# Patient Record
Sex: FEMALE | Race: BLACK OR AFRICAN AMERICAN | NOT HISPANIC OR LATINO | Employment: OTHER | URBAN - METROPOLITAN AREA
[De-identification: names, ages, dates, MRNs, and addresses within clinical notes are randomized per-mention and may not be internally consistent; named-entity substitution may affect disease eponyms.]

---

## 2018-01-01 ENCOUNTER — OFFICE VISIT (OUTPATIENT)
Dept: HEMATOLOGY/ONCOLOGY | Facility: CLINIC | Age: 73
End: 2018-01-01
Payer: MEDICARE

## 2018-01-01 ENCOUNTER — TELEPHONE (OUTPATIENT)
Dept: HEMATOLOGY/ONCOLOGY | Facility: CLINIC | Age: 73
End: 2018-01-01

## 2018-01-01 VITALS
DIASTOLIC BLOOD PRESSURE: 86 MMHG | WEIGHT: 139.25 LBS | TEMPERATURE: 98 F | HEART RATE: 80 BPM | OXYGEN SATURATION: 98 % | SYSTOLIC BLOOD PRESSURE: 185 MMHG | BODY MASS INDEX: 23.9 KG/M2

## 2018-01-01 VITALS
OXYGEN SATURATION: 99 % | WEIGHT: 138.25 LBS | SYSTOLIC BLOOD PRESSURE: 183 MMHG | TEMPERATURE: 98 F | HEART RATE: 97 BPM | DIASTOLIC BLOOD PRESSURE: 82 MMHG | BODY MASS INDEX: 23.73 KG/M2

## 2018-01-01 DIAGNOSIS — C92.10 CML (CHRONIC MYELOCYTIC LEUKEMIA): Primary | ICD-10-CM

## 2018-01-01 DIAGNOSIS — C92.11 BCR/ABL1-POSITIVE CHRONIC MYELOID LEUKEMIA (CML) IN REMISSION: ICD-10-CM

## 2018-01-01 PROCEDURE — 99213 OFFICE O/P EST LOW 20 MIN: CPT | Mod: PBBFAC | Performed by: INTERNAL MEDICINE

## 2018-01-01 PROCEDURE — 99999 PR PBB SHADOW E&M-EST. PATIENT-LVL III: CPT | Mod: PBBFAC,,, | Performed by: INTERNAL MEDICINE

## 2018-01-01 PROCEDURE — 99213 OFFICE O/P EST LOW 20 MIN: CPT | Mod: S$PBB,,, | Performed by: INTERNAL MEDICINE

## 2018-01-01 PROCEDURE — 3079F DIAST BP 80-89 MM HG: CPT | Mod: CPTII,,, | Performed by: INTERNAL MEDICINE

## 2018-01-01 PROCEDURE — 99213 OFFICE O/P EST LOW 20 MIN: CPT | Mod: S$GLB,,, | Performed by: INTERNAL MEDICINE

## 2018-01-01 PROCEDURE — 1101F PT FALLS ASSESS-DOCD LE1/YR: CPT | Mod: CPTII,,, | Performed by: INTERNAL MEDICINE

## 2018-01-01 PROCEDURE — 3077F SYST BP >= 140 MM HG: CPT | Mod: CPTII,,, | Performed by: INTERNAL MEDICINE

## 2018-01-01 PROCEDURE — 3079F DIAST BP 80-89 MM HG: CPT | Mod: CPTII,S$GLB,, | Performed by: INTERNAL MEDICINE

## 2018-01-01 PROCEDURE — 3077F SYST BP >= 140 MM HG: CPT | Mod: CPTII,S$GLB,, | Performed by: INTERNAL MEDICINE

## 2018-01-01 RX ORDER — IMATINIB MESYLATE 400 MG/1
400 TABLET, FILM COATED ORAL DAILY
Qty: 30 TABLET | Refills: 2 | Status: SHIPPED | OUTPATIENT
Start: 2018-01-01 | End: 2019-01-01 | Stop reason: SDUPTHER

## 2018-02-05 ENCOUNTER — INITIAL CONSULT (OUTPATIENT)
Dept: HEMATOLOGY/ONCOLOGY | Facility: CLINIC | Age: 73
End: 2018-02-05
Payer: MEDICARE

## 2018-02-05 ENCOUNTER — LAB VISIT (OUTPATIENT)
Dept: LAB | Facility: HOSPITAL | Age: 73
End: 2018-02-05
Attending: INTERNAL MEDICINE
Payer: MEDICARE

## 2018-02-05 VITALS
TEMPERATURE: 98 F | HEIGHT: 64 IN | WEIGHT: 140.44 LBS | DIASTOLIC BLOOD PRESSURE: 81 MMHG | HEART RATE: 89 BPM | BODY MASS INDEX: 23.98 KG/M2 | OXYGEN SATURATION: 99 % | SYSTOLIC BLOOD PRESSURE: 180 MMHG

## 2018-02-05 DIAGNOSIS — C92.10 CML (CHRONIC MYELOCYTIC LEUKEMIA): Primary | ICD-10-CM

## 2018-02-05 DIAGNOSIS — C92.10 CML (CHRONIC MYELOCYTIC LEUKEMIA): ICD-10-CM

## 2018-02-05 LAB
ALBUMIN SERPL BCP-MCNC: 3.8 G/DL
ALP SERPL-CCNC: 93 U/L
ALT SERPL W/O P-5'-P-CCNC: 7 U/L
ANION GAP SERPL CALC-SCNC: 9 MMOL/L
AST SERPL-CCNC: 12 U/L
BASOPHILS # BLD AUTO: 0.01 K/UL
BASOPHILS NFR BLD: 0.2 %
BILIRUB SERPL-MCNC: 0.2 MG/DL
BUN SERPL-MCNC: 15 MG/DL
CALCIUM SERPL-MCNC: 9.3 MG/DL
CHLORIDE SERPL-SCNC: 102 MMOL/L
CO2 SERPL-SCNC: 31 MMOL/L
CREAT SERPL-MCNC: 0.9 MG/DL
DIFFERENTIAL METHOD: ABNORMAL
EOSINOPHIL # BLD AUTO: 0.1 K/UL
EOSINOPHIL NFR BLD: 2.6 %
ERYTHROCYTE [DISTWIDTH] IN BLOOD BY AUTOMATED COUNT: 19 %
EST. GFR  (AFRICAN AMERICAN): >60 ML/MIN/1.73 M^2
EST. GFR  (NON AFRICAN AMERICAN): >60 ML/MIN/1.73 M^2
GLUCOSE SERPL-MCNC: 142 MG/DL
HCT VFR BLD AUTO: 33.9 %
HGB BLD-MCNC: 10.9 G/DL
LDH SERPL L TO P-CCNC: 178 U/L
LYMPHOCYTES # BLD AUTO: 2 K/UL
LYMPHOCYTES NFR BLD: 40 %
MCH RBC QN AUTO: 26.9 PG
MCHC RBC AUTO-ENTMCNC: 32.2 G/DL
MCV RBC AUTO: 84 FL
MONOCYTES # BLD AUTO: 0.4 K/UL
MONOCYTES NFR BLD: 7.7 %
NEUTROPHILS # BLD AUTO: 2.5 K/UL
NEUTROPHILS NFR BLD: 49.5 %
PLATELET # BLD AUTO: 283 K/UL
PMV BLD AUTO: 10.2 FL
POTASSIUM SERPL-SCNC: 4 MMOL/L
PROT SERPL-MCNC: 7.4 G/DL
RBC # BLD AUTO: 4.05 M/UL
SODIUM SERPL-SCNC: 142 MMOL/L
WBC # BLD AUTO: 5.08 K/UL

## 2018-02-05 PROCEDURE — 1159F MED LIST DOCD IN RCRD: CPT | Mod: S$GLB,,, | Performed by: INTERNAL MEDICINE

## 2018-02-05 PROCEDURE — 1126F AMNT PAIN NOTED NONE PRSNT: CPT | Mod: S$GLB,,, | Performed by: INTERNAL MEDICINE

## 2018-02-05 PROCEDURE — 3008F BODY MASS INDEX DOCD: CPT | Mod: S$GLB,,, | Performed by: INTERNAL MEDICINE

## 2018-02-05 PROCEDURE — 83615 LACTATE (LD) (LDH) ENZYME: CPT

## 2018-02-05 PROCEDURE — 99999 PR PBB SHADOW E&M-EST. PATIENT-LVL III: CPT | Mod: PBBFAC,,, | Performed by: INTERNAL MEDICINE

## 2018-02-05 PROCEDURE — 80053 COMPREHEN METABOLIC PANEL: CPT

## 2018-02-05 PROCEDURE — 85025 COMPLETE CBC W/AUTO DIFF WBC: CPT

## 2018-02-05 PROCEDURE — 99213 OFFICE O/P EST LOW 20 MIN: CPT | Mod: S$GLB,,, | Performed by: INTERNAL MEDICINE

## 2018-02-05 NOTE — PROGRESS NOTES
"Chief Complaint :  CML.    Hx of Present illness :  Patient is a 73 y.o. year old female who presents to the clinic today for  Hematology followup. Evaluated by me in the past for CML. On Gleevec   Appetite fair; Has Urinary  Incontinence. Has loose stools. No headaches. Dizziness, nausea, vomiting. Has Dyspnea related to COPD. Under Care of Pulmonologist . Denies pain Sx. No overt bleeding. Lost a few pounds      Allergies :    Review of patient's allergies indicates:   Allergen Reactions    Morphine      Pt states, "I'm not allergic to morphine they gave me too much at [facility]. Instead of giving me 2mg she gave me 5mg and I was almost dead."     Codeine      Blisters vs sweling (pt unsure which one)    Iodine containing multivitamin      Blisters vs swelling (pt unsure which one.)    Lidocaine        Occupation :  Homemaker    Transfusion :  None    Menstrual & obstetric Hx :  6, Para 6  Age of menarche: 13  Age of first pregnancy: 18  Lactation history: No  Age of menopause: Hysterectomy at age 33, for Fibroids and Bleeding  HRT:  None    Present Meds :  Reviewed  Medication List with Changes/Refills   Current Medications    ADVAIR DISKUS 100-50 MCG/DOSE DISKUS INHALER    Take 1 puff by mouth Twice daily.    AMLODIPINE (NORVASC) 10 MG TABLET    Take 1 tablet (10 mg total) by mouth once daily.    ASPIRIN (ECOTRIN) 81 MG EC TABLET    Take 81 mg by mouth once daily.      CALCIUM-VITAMIN D (CALCIUM-VITAMIN D) 500 MG(1,250MG) -200 UNIT PER TABLET    Take 1 tablet by mouth 2 (two) times daily with meals.      CLONIDINE (CATAPRES) 0.1 MG TABLET    Take 1 tablet (0.1 mg total) by mouth 2 (two) times daily.    CLOPIDOGREL (PLAVIX) 75 MG TABLET    Take 1 tablet (75 mg total) by mouth once daily.    CRESTOR 20 MG TABLET    Take 10 mg by mouth every evening.     ERGOCALCIFEROL (ERGOCALCIFEROL) 50,000 UNIT CAP    Take 50,000 Units by mouth every 7 days.      FUROSEMIDE (LASIX) 40 MG TABLET    Take 1 " tablet (40 mg total) by mouth once daily.    GABAPENTIN (NEURONTIN) 300 MG CAPSULE        HYDROCODONE-ACETAMINOPHEN 5-325MG (NORCO) 5-325 MG PER TABLET    Take 1 tablet by mouth 3 (three) times daily as needed.     IMATINIB (GLEEVEC) 100 MG TAB    Take 400 mg by mouth once daily.    INSULIN GLARGINE (LANTUS) 100 UNIT/ML INJECTION    Inject 25 Units into the skin every evening.    ISOSORBIDE MONONITRATE (IMDUR) 30 MG 24 HR TABLET    Take 1 tablet (30 mg total) by mouth once daily.    LEVETIRACETAM (KEPPRA) 500 MG TAB    Take 2 tablets by mouth Twice daily.    LISINOPRIL (PRINIVIL,ZESTRIL) 40 MG TABLET    Take 1 tablet (40 mg total) by mouth once daily.    METOPROLOL SUCCINATE (TOPROL-XL) 50 MG 24 HR TABLET    Take 1 tablet (50 mg total) by mouth once daily.    NITROGLYCERIN (NITROSTAT) 0.4 MG SL TABLET    Place 1 tablet (0.4 mg total) under the tongue every 5 (five) minutes as needed for Chest pain.    RANOLAZINE (RANEXA) 500 MG TB12    Take 500 mg by mouth 2 (two) times daily.    TIOTROPIUM (SPIRIVA) 18 MCG INHALATION CAPSULE    Inhale 18 mcg into the lungs once daily.      VENTOLIN HFA 90 MCG/ACTUATION HFAA    Inhale 2 puffs into the lungs every 4 to 6 hours as needed.       Past Medical Hx : Known to Have DM, HTN, CML, COPD, Hyperlipidemia    Past Medical Hx :  Past Medical History:   Diagnosis Date    Asthma     Cancer 10/10/2014    Bone cancer     COPD (chronic obstructive pulmonary disease)     Diabetes mellitus     Diabetes mellitus type II     Emphysema of lung     Hypercholesteremia     Hypertension     Myocardial infarct 12/25/2013    Palpitations     PVD (peripheral vascular disease)     Seizures     Stroke        Travel Hx :  N/A    Immunization :  Immunization History   Administered Date(s) Administered    Influenza - High Dose 11/04/2014       Family Hx :  Family History   Problem Relation Age of Onset    Breast cancer Sister     Colon cancer Neg Hx     Ovarian cancer Neg Hx      Diabetes Mother     Asthma Mother     Cancer Father        Social Hx :  Social History     Social History    Marital status:      Spouse name: N/A    Number of children: N/A    Years of education: N/A     Occupational History    Not on file.     Social History Main Topics    Smoking status: Current Some Day Smoker     Packs/day: 0.50     Years: 56.00     Types: Cigarettes    Smokeless tobacco: Never Used    Alcohol use No    Drug use: No    Sexual activity: No     Other Topics Concern    Not on file     Social History Narrative    No narrative on file       Surgery : C. Section.  Hysterectomy. Appendectomy. Cholecystectomy. Colonoscopy Cataract Surgery    Symptoms :    Review of Systems   Constitutional: Negative for chills, diaphoresis, fever, malaise/fatigue and weight loss.   HENT: Positive for hearing loss. Negative for congestion, ear discharge, ear pain, nosebleeds, sinus pain and tinnitus.    Eyes: Negative for blurred vision, double vision, photophobia, pain, discharge and redness.   Respiratory: Positive for shortness of breath (Related to COPD). Negative for cough, hemoptysis, sputum production, wheezing and stridor.    Cardiovascular: Negative for chest pain, palpitations, orthopnea, claudication, leg swelling and PND.   Gastrointestinal: Positive for diarrhea. Negative for abdominal pain, blood in stool, constipation, heartburn, nausea and vomiting.   Genitourinary: Positive for urgency. Negative for dysuria, flank pain, frequency and hematuria.   Musculoskeletal: Positive for joint pain. Negative for back pain, falls, myalgias and neck pain.   Skin: Negative for itching and rash.   Neurological: Negative for dizziness, tingling, tremors, sensory change, speech change, focal weakness, seizures, loss of consciousness, weakness and headaches.   Endo/Heme/Allergies: Negative for polydipsia. Does not bruise/bleed easily.   Psychiatric/Behavioral: Negative for depression, hallucinations,  memory loss, substance abuse and suicidal ideas. The patient is not nervous/anxious and does not have insomnia.        Physical Exam :   Physical Exam   Constitutional: She is oriented to person, place, and time and well-developed, well-nourished, and in no distress. No distress.   HENT:   Head: Normocephalic and atraumatic.   Right Ear: External ear normal.   Left Ear: External ear normal.   Nose: Nose normal.   Mouth/Throat: Oropharyngeal exudate present.   Eyes: Conjunctivae, EOM and lids are normal. Lids are everted and swept, no foreign bodies found. No scleral icterus. Pupils are unequal.       Neck: Normal range of motion. No JVD present. No tracheal deviation present. No thyromegaly present.   Cardiovascular: Normal rate, regular rhythm, normal heart sounds and intact distal pulses.    Pulmonary/Chest: Effort normal and breath sounds normal. No stridor. No respiratory distress. She has no wheezes. She has no rales. She exhibits no tenderness.   Abdominal: Soft. Bowel sounds are normal. She exhibits no distension and no mass. There is no tenderness. There is no rebound and no guarding.   Genitourinary:   Genitourinary Comments: Not Examined   Musculoskeletal: Normal range of motion.   Lymphadenopathy:        Head (right side): No submental, no submandibular, no tonsillar, no preauricular, no posterior auricular and no occipital adenopathy present.        Head (left side): No submental, no submandibular, no tonsillar, no preauricular, no posterior auricular and no occipital adenopathy present.     She has no axillary adenopathy.        Right: No inguinal, no supraclavicular and no epitrochlear adenopathy present.        Left: No inguinal, no supraclavicular and no epitrochlear adenopathy present.   Neurological: She is alert and oriented to person, place, and time. She has normal motor skills, normal sensation, normal strength, normal reflexes and intact cranial nerves. Gait normal. GCS score is 15.   Skin:  Skin is warm, dry and intact. No rash noted. She is not diaphoretic. No cyanosis. No pallor. Nails show no clubbing.   Psychiatric: Mood, memory, affect and judgment normal.         Labs & Imaging :  Pending        Dx :  CML.      Assessment & Plan:  Reviewed with Patient. CBC, CMP, LDH today. Check Labs & Make update. Request records from [facility]      Distress Screening Results: Psychosocial Distress screening score of Distress Score: 3 noted and reviewed. No intervention indicated.

## 2018-03-07 ENCOUNTER — LAB VISIT (OUTPATIENT)
Dept: LAB | Facility: HOSPITAL | Age: 73
End: 2018-03-07
Attending: INTERNAL MEDICINE
Payer: MEDICARE

## 2018-03-07 DIAGNOSIS — C92.10 CML (CHRONIC MYELOCYTIC LEUKEMIA): ICD-10-CM

## 2018-03-07 LAB
ALBUMIN SERPL BCP-MCNC: 3.9 G/DL
ALP SERPL-CCNC: 92 U/L
ALT SERPL W/O P-5'-P-CCNC: 9 U/L
ANION GAP SERPL CALC-SCNC: 11 MMOL/L
AST SERPL-CCNC: 14 U/L
BASOPHILS # BLD AUTO: 0.01 K/UL
BASOPHILS NFR BLD: 0.2 %
BILIRUB SERPL-MCNC: 0.4 MG/DL
BUN SERPL-MCNC: 12 MG/DL
CALCIUM SERPL-MCNC: 9.6 MG/DL
CHLORIDE SERPL-SCNC: 101 MMOL/L
CO2 SERPL-SCNC: 26 MMOL/L
CREAT SERPL-MCNC: 0.9 MG/DL
DIFFERENTIAL METHOD: ABNORMAL
EOSINOPHIL # BLD AUTO: 0.1 K/UL
EOSINOPHIL NFR BLD: 1.2 %
ERYTHROCYTE [DISTWIDTH] IN BLOOD BY AUTOMATED COUNT: 19.4 %
EST. GFR  (AFRICAN AMERICAN): >60 ML/MIN/1.73 M^2
EST. GFR  (NON AFRICAN AMERICAN): >60 ML/MIN/1.73 M^2
GLUCOSE SERPL-MCNC: 112 MG/DL
HCT VFR BLD AUTO: 37.4 %
HGB BLD-MCNC: 11.9 G/DL
LYMPHOCYTES # BLD AUTO: 2.9 K/UL
LYMPHOCYTES NFR BLD: 51 %
MCH RBC QN AUTO: 26.8 PG
MCHC RBC AUTO-ENTMCNC: 31.8 G/DL
MCV RBC AUTO: 84 FL
MONOCYTES # BLD AUTO: 0.4 K/UL
MONOCYTES NFR BLD: 6.9 %
NEUTROPHILS # BLD AUTO: 2.3 K/UL
NEUTROPHILS NFR BLD: 40.5 %
PLATELET # BLD AUTO: 297 K/UL
PMV BLD AUTO: 9.5 FL
POTASSIUM SERPL-SCNC: 4 MMOL/L
PROT SERPL-MCNC: 7.5 G/DL
RBC # BLD AUTO: 4.44 M/UL
SODIUM SERPL-SCNC: 138 MMOL/L
WBC # BLD AUTO: 5.77 K/UL

## 2018-03-07 PROCEDURE — 36415 COLL VENOUS BLD VENIPUNCTURE: CPT

## 2018-03-07 PROCEDURE — 85025 COMPLETE CBC W/AUTO DIFF WBC: CPT

## 2018-03-07 PROCEDURE — 80053 COMPREHEN METABOLIC PANEL: CPT

## 2018-03-15 ENCOUNTER — TELEPHONE (OUTPATIENT)
Dept: HEMATOLOGY/ONCOLOGY | Facility: CLINIC | Age: 73
End: 2018-03-15

## 2018-03-15 NOTE — TELEPHONE ENCOUNTER
Call returned to patient. Patient rescheduled appointment stating she has shoulder pain. She will schedule a appointment with her PCP at 9 am when the office opens.

## 2018-03-15 NOTE — TELEPHONE ENCOUNTER
----- Message from Angelique Onofre sent at 3/15/2018  7:26 AM CDT -----  Contact: Pt  Pt called to speak to the nurse to cancel/reschedule her appt today with the provider due to being ill. Pt would like a call back today.    Pt can be reached at [phone].    Thanks

## 2018-03-19 RX ORDER — IMATINIB MESYLATE 400 MG/1
TABLET ORAL
COMMUNITY
Start: 2018-02-05 | End: 2018-04-19 | Stop reason: SDUPTHER

## 2018-03-21 ENCOUNTER — TELEPHONE (OUTPATIENT)
Dept: HEMATOLOGY/ONCOLOGY | Facility: CLINIC | Age: 73
End: 2018-03-21

## 2018-03-21 RX ORDER — IMATINIB MESYLATE 400 MG/1
TABLET ORAL
Status: CANCELLED | OUTPATIENT
Start: 2018-03-21

## 2018-03-23 ENCOUNTER — HOSPITAL ENCOUNTER (EMERGENCY)
Facility: HOSPITAL | Age: 73
Discharge: HOME | End: 2018-03-24
Attending: EMERGENCY MEDICINE
Payer: MEDICARE

## 2018-03-23 DIAGNOSIS — T63.481A ALLERGIC REACTION TO INSECT STING, ACCIDENTAL OR UNINTENTIONAL, INITIAL ENCOUNTER: Primary | ICD-10-CM

## 2018-03-23 LAB — POCT GLUCOSE: 120 MG/DL (ref 70–110)

## 2018-03-23 PROCEDURE — 99284 EMERGENCY DEPT VISIT MOD MDM: CPT | Mod: 25

## 2018-03-23 PROCEDURE — 96375 TX/PRO/DX INJ NEW DRUG ADDON: CPT

## 2018-03-23 PROCEDURE — 63600175 PHARM REV CODE 636 W HCPCS: Performed by: EMERGENCY MEDICINE

## 2018-03-23 PROCEDURE — 94640 AIRWAY INHALATION TREATMENT: CPT

## 2018-03-23 PROCEDURE — 25000242 PHARM REV CODE 250 ALT 637 W/ HCPCS: Performed by: EMERGENCY MEDICINE

## 2018-03-23 PROCEDURE — 96372 THER/PROPH/DIAG INJ SC/IM: CPT | Mod: 59

## 2018-03-23 PROCEDURE — 96374 THER/PROPH/DIAG INJ IV PUSH: CPT

## 2018-03-23 PROCEDURE — 25000003 PHARM REV CODE 250: Performed by: EMERGENCY MEDICINE

## 2018-03-23 PROCEDURE — S0028 INJECTION, FAMOTIDINE, 20 MG: HCPCS | Performed by: EMERGENCY MEDICINE

## 2018-03-23 RX ORDER — METHYLPREDNISOLONE SOD SUCC 125 MG
125 VIAL (EA) INJECTION
Status: COMPLETED | OUTPATIENT
Start: 2018-03-23 | End: 2018-03-23

## 2018-03-23 RX ORDER — FAMOTIDINE 10 MG/ML
20 INJECTION INTRAVENOUS
Status: COMPLETED | OUTPATIENT
Start: 2018-03-23 | End: 2018-03-23

## 2018-03-23 RX ORDER — IPRATROPIUM BROMIDE AND ALBUTEROL SULFATE 2.5; .5 MG/3ML; MG/3ML
3 SOLUTION RESPIRATORY (INHALATION)
Status: COMPLETED | OUTPATIENT
Start: 2018-03-23 | End: 2018-03-23

## 2018-03-23 RX ORDER — METHYLPREDNISOLONE ACETATE 80 MG/ML
40 INJECTION, SUSPENSION INTRA-ARTICULAR; INTRALESIONAL; INTRAMUSCULAR; SOFT TISSUE
Status: COMPLETED | OUTPATIENT
Start: 2018-03-23 | End: 2018-03-23

## 2018-03-23 RX ADMIN — IPRATROPIUM BROMIDE AND ALBUTEROL SULFATE 3 ML: .5; 2.5 SOLUTION RESPIRATORY (INHALATION) at 10:03

## 2018-03-23 RX ADMIN — METHYLPREDNISOLONE SODIUM SUCCINATE 125 MG: 125 INJECTION, POWDER, FOR SOLUTION INTRAMUSCULAR; INTRAVENOUS at 09:03

## 2018-03-23 RX ADMIN — METHYLPREDNISOLONE ACETATE 40 MG: 80 INJECTION, SUSPENSION INTRA-ARTICULAR; INTRALESIONAL; INTRAMUSCULAR; SOFT TISSUE at 11:03

## 2018-03-23 RX ADMIN — FAMOTIDINE 20 MG: 10 INJECTION INTRAVENOUS at 09:03

## 2018-03-23 RX ADMIN — IPRATROPIUM BROMIDE AND ALBUTEROL SULFATE 3 ML: .5; 2.5 SOLUTION RESPIRATORY (INHALATION) at 11:03

## 2018-03-24 VITALS
DIASTOLIC BLOOD PRESSURE: 72 MMHG | HEART RATE: 90 BPM | SYSTOLIC BLOOD PRESSURE: 167 MMHG | WEIGHT: 140 LBS | BODY MASS INDEX: 23.9 KG/M2 | HEIGHT: 64 IN | RESPIRATION RATE: 18 BRPM | TEMPERATURE: 99 F | OXYGEN SATURATION: 99 %

## 2018-03-24 NOTE — ED PROVIDER NOTES
"Encounter Date: 3/23/2018    SCRIBE #1 NOTE: I, Carrington Everette, am scribing for, and in the presence of, Rupal Randall MD. Other sections scribed: HPI, ROS, PE.       History     Chief Complaint   Patient presents with    Allergic Reaction     reports unknown if stung by wasp or bitten by an insect, EMS report red point of origin noted on R hand, swelling and redness present noted to back of hand, given 50mg of benadryl      CC: Allergic Reaction  HPI: This 73 y.o. female smoker with Hx of COPD, O2 dependence, HTN, HLD, DM type II, CAD, PVD, CVA presents to the ED via EMS c/o pain and swelling to R hand with associated redness extending up her arm acute onset s/p getting stung by an unspecified flying insect on her porch at 1030 this morning. Daughter states that they called 911 tonight b/c pt began wheezing and having increased SOB at 1900. Pt gave herself a nebulizer treatment at home; she reports last using it 2 weeks ago. Pt wears 2L O2 NC around the clock. Daughter reports pt was given Benadryl by EMS, none taken prior. Pt denies fever, chest pain, throat swelling, drooling, facial swelling.         The history is provided by the patient and a relative.     Review of patient's allergies indicates:   Allergen Reactions    Morphine      Pt states, "I'm not allergic to morphine they gave me too much at [facility]. Instead of giving me 2mg she gave me 5mg and I was almost dead."     Codeine      Blisters vs sweling (pt unsure which one)    Iodine containing multivitamin      Blisters vs swelling (pt unsure which one.)    Lidocaine      Past Medical History:   Diagnosis Date    Asthma     Cancer 10/10/2014    Bone cancer     COPD (chronic obstructive pulmonary disease)     Diabetes mellitus     Diabetes mellitus type II     Emphysema of lung     Hypercholesteremia     Hypertension     Myocardial infarct 12/25/2013    Palpitations     PVD (peripheral vascular disease)     Seizures     Stroke  "     Past Surgical History:   Procedure Laterality Date    APPENDECTOMY       SECTION      CHOLECYSTECTOMY      HYSTERECTOMY      VASCULAR SURGERY      stent in L leg; unable to place in R leg d/t blockage     Family History   Problem Relation Age of Onset    Breast cancer Sister     Colon cancer Neg Hx     Ovarian cancer Neg Hx     Diabetes Mother     Asthma Mother     Cancer Father      Social History   Substance Use Topics    Smoking status: Current Some Day Smoker     Packs/day: 0.50     Years: 56.00     Types: Cigarettes    Smokeless tobacco: Never Used    Alcohol use No     Review of Systems   Constitutional: Negative for chills and fever.   HENT: Negative for facial swelling, rhinorrhea, sore throat and trouble swallowing.    Eyes: Negative for pain and visual disturbance.   Respiratory: Positive for shortness of breath (chronic) and wheezing.    Cardiovascular: Negative for chest pain.   Gastrointestinal: Negative for nausea and vomiting.   Genitourinary: Negative for flank pain.   Musculoskeletal:        (+) R arm pain  (+) R hand swelling   Skin: Positive for color change (redness to R hand).   Neurological: Negative for numbness and headaches.       Physical Exam     Initial Vitals   BP Pulse Resp Temp SpO2   18   (!) 196/110 76 20 98.5 °F (36.9 °C) 100 %      MAP       18       138.67         Physical Exam    Constitutional: She appears well-developed and well-nourished. She is not diaphoretic. No distress.   HENT:   Head: Normocephalic and atraumatic.   Suggestion swelling to uvula. No embarrassment of respiration.   Eyes: Pupils are equal, round, and reactive to light.   Neck: Normal range of motion.   Cardiovascular: Normal rate and regular rhythm.   Pulmonary/Chest: No stridor. No respiratory distress.   Faint inspiratory wheezing   Abdominal: Soft. There is no tenderness.   Musculoskeletal: Normal  range of motion.   Diffuse swelling to dorsum of R hand to about 5 inches proximal to wrist with erythema extending extensor surface snf between elbow and shoulder and streaking to flexor surface up to elbow. No swelling noted to flexor surface.   Neurological: She is alert.   Skin: Skin is warm and dry.   No stinger noticed in tissues of dorsum of R hand   Psychiatric: Thought content normal.         ED Course   Procedures  Labs Reviewed - No data to display          Medical Decision Making:   Initial Assessment:   10:30 A TODAY APPARENT WASP STUNG PT IN DORSUM OF RIGHT HAND.    NO HX OF INSECT ALLERGY.    NO LIGHTHEADEDNESS,    NO SWALLOWING PROBLEMS.    SWELLING TO RIGHT HAND DORSUM   NO STINGER.   RED STREAKING FROM HAND TO POINT MIDWAY FROM ELBOW TO SHOULDER    HAS COPD AND OCC WHEEZES  NO USE NEB TODAY AND EMS ONLY USED BENADRYL AND OXYGEN--NO NEB.    PT SAYS SL WHEEZE THIS PM    Differential Diagnosis:   ALLERGIC REACTION STINGING INSECT  (WAS SITTING ON PORCH WHEN FLYING INSECT STUNG)  TOO EARLY FOR INFECTION      ED Management:  SPOT SUGAR   120      2300      NO DISTRESS        LUNGS ESSENTIALLY CLEAR BUT WILL RX ANOTHER NEB TO LAST HE NIGHT     OROP, UNCHANGED       NO STRIDOR         UE EDEMA NO WORSE           WILL USE SLING TO ELEVATE HAND,    40 MG DEPOMEDROL  AND WATCH GLUCOSE     VERY SLEEPY SO WILL NOT REPEAT BENADRYL SO AS TO INCR FALL RISK          0005     AFTER NEB   WHEEZE FREE     HAD MEDROL INJECTION    WANTS HOME                 Scribe Attestation:   Scribe #1: I performed the above scribed service and the documentation accurately describes the services I performed. I attest to the accuracy of the note.    Attending Attestation:           Physician Attestation for Scribe:  Physician Attestation Statement for Scribe #1: I, Rupal Randall MD, reviewed documentation, as scribed by Carrington Gaviria in my presence, and it is both accurate and complete.                    Clinical Impression:    There were no encounter diagnoses.                           Rupal Randall MD  03/24/18 0005

## 2018-03-24 NOTE — DISCHARGE INSTRUCTIONS
INJECTION OF STEROID LAST A WEEK.    TAKE OVER THE COUNTER BENADRYL  (25 MG) WITH EACH MEAL TOMORROW      USE SLING NEXT COUPLE OF DAY TO ELEVATE HAND TO HIGHER THAN YOUR HEART     RETURN TO ER AS NEEDED.

## 2018-03-24 NOTE — ED NOTES
Pt now c/o SOB and chest pain. Pt family member reports pt is normally on 4L home 02. No resp distress noted.

## 2018-03-28 ENCOUNTER — OFFICE VISIT (OUTPATIENT)
Dept: HEMATOLOGY/ONCOLOGY | Facility: CLINIC | Age: 73
End: 2018-03-28
Payer: MEDICARE

## 2018-03-28 VITALS
HEART RATE: 86 BPM | DIASTOLIC BLOOD PRESSURE: 84 MMHG | WEIGHT: 139.75 LBS | BODY MASS INDEX: 23.99 KG/M2 | SYSTOLIC BLOOD PRESSURE: 198 MMHG | OXYGEN SATURATION: 98 % | TEMPERATURE: 98 F

## 2018-03-28 DIAGNOSIS — C92.10 CML (CHRONIC MYELOCYTIC LEUKEMIA): Primary | ICD-10-CM

## 2018-03-28 PROCEDURE — 99999 PR PBB SHADOW E&M-EST. PATIENT-LVL III: CPT | Mod: PBBFAC,,, | Performed by: INTERNAL MEDICINE

## 2018-03-28 PROCEDURE — 3077F SYST BP >= 140 MM HG: CPT | Mod: CPTII,S$GLB,, | Performed by: INTERNAL MEDICINE

## 2018-03-28 PROCEDURE — 3079F DIAST BP 80-89 MM HG: CPT | Mod: CPTII,S$GLB,, | Performed by: INTERNAL MEDICINE

## 2018-03-28 PROCEDURE — 99213 OFFICE O/P EST LOW 20 MIN: CPT | Mod: S$GLB,,, | Performed by: INTERNAL MEDICINE

## 2018-03-28 RX ORDER — PREDNISONE 20 MG/1
TABLET ORAL
Status: ON HOLD | COMMUNITY
Start: 2018-01-06 | End: 2019-01-01 | Stop reason: HOSPADM

## 2018-03-28 RX ORDER — PREDNISOLONE ACETATE 10 MG/ML
SUSPENSION/ DROPS OPHTHALMIC
COMMUNITY
Start: 2018-02-19

## 2018-03-28 RX ORDER — LEVOFLOXACIN 750 MG/1
TABLET ORAL
Status: ON HOLD | COMMUNITY
Start: 2018-01-06 | End: 2019-01-01 | Stop reason: HOSPADM

## 2018-03-28 NOTE — PROGRESS NOTES
"Chief Complaint :  CML.    Hx of Present illness :  Patient is a 73 y.o. year old female who presents to the clinic today for  Hematology followup.  On Gleevec   Appetite fair; Has Urinary  Incontinence. Has loose stools.  States has nausea after Gleevec. No vomiting.  Has Dyspnea related to COPD. Under Care of Pulmonologist .  No chest or abdominal pain.  Sx. No overt bleeding. Energy fair.  Having trouble with  Rotator Cuff left Shoulder. Presently on Gleevec 600 Mg daily. Went to ER 5 days ago with WASP sting      Allergies :    Review of patient's allergies indicates:   Allergen Reactions    Morphine      Pt states, "I'm not allergic to morphine they gave me too much at [facility]. Instead of giving me 2mg she gave me 5mg and I was almost dead."     Codeine      Blisters vs sweling (pt unsure which one)    Iodine containing multivitamin      Blisters vs swelling (pt unsure which one.)    Lidocaine        Occupation :  Homemaker    Transfusion :  None    Menstrual & obstetric Hx :  6, Para 6  Age of menarche: 13  Age of first pregnancy: 18  Lactation history: No  Age of menopause: Hysterectomy at age 33, for Fibroids and Bleeding  HRT:  None    Present Meds :  Reviewed  Medication List with Changes/Refills   Current Medications    ADVAIR DISKUS 100-50 MCG/DOSE DISKUS INHALER    Take 1 puff by mouth Twice daily.    AMLODIPINE (NORVASC) 10 MG TABLET    Take 1 tablet (10 mg total) by mouth once daily.    ASPIRIN (ECOTRIN) 81 MG EC TABLET    Take 81 mg by mouth once daily.      CALCIUM-VITAMIN D (CALCIUM-VITAMIN D) 500 MG(1,250MG) -200 UNIT PER TABLET    Take 1 tablet by mouth 2 (two) times daily with meals.      CLONIDINE (CATAPRES) 0.1 MG TABLET    Take 1 tablet (0.1 mg total) by mouth 2 (two) times daily.    CLOPIDOGREL (PLAVIX) 75 MG TABLET    Take 1 tablet (75 mg total) by mouth once daily.    CRESTOR 20 MG TABLET    Take 10 mg by mouth every evening.     ERGOCALCIFEROL (ERGOCALCIFEROL) 50,000 " UNIT CAP    Take 50,000 Units by mouth every 7 days.      FUROSEMIDE (LASIX) 40 MG TABLET    Take 1 tablet (40 mg total) by mouth once daily.    GABAPENTIN (NEURONTIN) 300 MG CAPSULE        GLEEVEC 400 MG TAB        HYDROCODONE-ACETAMINOPHEN 5-325MG (NORCO) 5-325 MG PER TABLET    Take 1 tablet by mouth 3 (three) times daily as needed.     IMATINIB (GLEEVEC) 100 MG TAB    Take 400 mg by mouth once daily.    INSULIN GLARGINE (LANTUS) 100 UNIT/ML INJECTION    Inject 25 Units into the skin every evening.    ISOSORBIDE MONONITRATE (IMDUR) 30 MG 24 HR TABLET    Take 1 tablet (30 mg total) by mouth once daily.    LEVETIRACETAM (KEPPRA) 500 MG TAB    Take 2 tablets by mouth Twice daily.    LISINOPRIL (PRINIVIL,ZESTRIL) 40 MG TABLET    Take 1 tablet (40 mg total) by mouth once daily.    METOPROLOL SUCCINATE (TOPROL-XL) 50 MG 24 HR TABLET    Take 1 tablet (50 mg total) by mouth once daily.    NITROGLYCERIN (NITROSTAT) 0.4 MG SL TABLET    Place 1 tablet (0.4 mg total) under the tongue every 5 (five) minutes as needed for Chest pain.    RANOLAZINE (RANEXA) 500 MG TB12    Take 500 mg by mouth 2 (two) times daily.    TIOTROPIUM (SPIRIVA) 18 MCG INHALATION CAPSULE    Inhale 18 mcg into the lungs once daily.      VENTOLIN HFA 90 MCG/ACTUATION HFAA    Inhale 2 puffs into the lungs every 4 to 6 hours as needed.       Past Medical Hx : Known to Have DM, HTN, CML, COPD, Hyperlipidemia    Past Medical Hx :  Past Medical History:   Diagnosis Date    Asthma     Cancer 10/10/2014    Bone cancer     COPD (chronic obstructive pulmonary disease)     Diabetes mellitus     Diabetes mellitus type II     Emphysema of lung     Hypercholesteremia     Hypertension     Myocardial infarct 12/25/2013    Palpitations     PVD (peripheral vascular disease)     Seizures     Stroke        Travel Hx :  N/A    Immunization :  Immunization History   Administered Date(s) Administered    Influenza - High Dose 11/04/2014       Family Hx :  Family  History   Problem Relation Age of Onset    Diabetes Mother     Asthma Mother     Cancer Father     Breast cancer Sister     Colon cancer Neg Hx     Ovarian cancer Neg Hx        Social Hx :  Social History     Social History    Marital status:      Spouse name: N/A    Number of children: N/A    Years of education: N/A     Occupational History    Not on file.     Social History Main Topics    Smoking status: Current Some Day Smoker     Packs/day: 0.50     Years: 56.00     Types: Cigarettes    Smokeless tobacco: Never Used    Alcohol use No    Drug use: No    Sexual activity: No     Other Topics Concern    Not on file     Social History Narrative    No narrative on file       Surgery : C. Section.  Hysterectomy. Appendectomy. Cholecystectomy. Colonoscopy Cataract Surgery    Symptoms :    Review of Systems   Constitutional: Negative for chills, diaphoresis, fever, malaise/fatigue and weight loss.   HENT: Positive for hearing loss. Negative for congestion, ear discharge, ear pain, nosebleeds, sinus pain and tinnitus.    Eyes: Negative for blurred vision, double vision, photophobia, pain, discharge and redness.   Respiratory: Positive for shortness of breath (Related to COPD). Negative for cough, hemoptysis, sputum production, wheezing and stridor.    Cardiovascular: Negative for chest pain, palpitations, orthopnea, claudication, leg swelling and PND.   Gastrointestinal: Positive for diarrhea and nausea. Negative for abdominal pain, blood in stool, constipation, heartburn and vomiting.   Genitourinary: Positive for urgency. Negative for dysuria, flank pain, frequency and hematuria.   Musculoskeletal: Positive for joint pain. Negative for back pain, falls, myalgias and neck pain.   Skin: Negative for itching and rash.   Neurological: Negative for dizziness, tingling, tremors, sensory change, speech change, focal weakness, seizures, loss of consciousness, weakness and headaches.    Endo/Heme/Allergies: Negative for polydipsia. Does not bruise/bleed easily.   Psychiatric/Behavioral: Negative for depression, hallucinations, memory loss, substance abuse and suicidal ideas. The patient is not nervous/anxious and does not have insomnia.        Physical Exam :   Physical Exam   Constitutional: She is oriented to person, place, and time and well-developed, well-nourished, and in no distress. No distress.   HENT:   Head: Normocephalic and atraumatic.   Right Ear: External ear normal.   Left Ear: External ear normal.   Nose: Nose normal.   Mouth/Throat: Oropharyngeal exudate present.   Eyes: Conjunctivae, EOM and lids are normal. Lids are everted and swept, no foreign bodies found. No scleral icterus. Pupils are unequal.       Neck: Normal range of motion. No JVD present. No tracheal deviation present. No thyromegaly present.   Cardiovascular: Normal rate, regular rhythm, normal heart sounds and intact distal pulses.    Pulmonary/Chest: Effort normal and breath sounds normal. No stridor. No respiratory distress. She has no wheezes. She has no rales. She exhibits no tenderness.   Abdominal: Soft. Bowel sounds are normal. She exhibits no distension and no mass. There is no tenderness. There is no rebound and no guarding.   Genitourinary:   Genitourinary Comments: Not Examined   Musculoskeletal:        Arms:  Lymphadenopathy:        Head (right side): No submental, no submandibular, no tonsillar, no preauricular, no posterior auricular and no occipital adenopathy present.        Head (left side): No submental, no submandibular, no tonsillar, no preauricular, no posterior auricular and no occipital adenopathy present.     She has no axillary adenopathy.        Right: No inguinal, no supraclavicular and no epitrochlear adenopathy present.        Left: No inguinal, no supraclavicular and no epitrochlear adenopathy present.   Neurological: She is alert and oriented to person, place, and time. She has normal  motor skills, normal sensation, normal strength, normal reflexes and intact cranial nerves. Gait normal. GCS score is 15.   Skin: Skin is warm, dry and intact. No rash noted. She is not diaphoretic. No cyanosis. No pallor. Nails show no clubbing.   Psychiatric: Mood, memory, affect and judgment normal.         Labs & Imaging :  03/07/18 : WBC 5,700. ANC 2,300 Hgb 11.9; Hct 37.6 NFBS 112; Cr. 0.9. Ca 9.6 Bili 0.4 ALP 92        Dx :  CML.      Assessment & Plan:  Reviewed with Patient.  Continue Gleevec. Monitor Labs. RTC one month. Check BCR/ABL by PCR . May need to decrease dose of Gleevec      Distress Screening Results: Psychosocial Distress screening score of   noted and reviewed. No intervention indicated.

## 2018-04-19 ENCOUNTER — TELEPHONE (OUTPATIENT)
Dept: PHARMACY | Facility: HOSPITAL | Age: 73
End: 2018-04-19

## 2018-04-19 DIAGNOSIS — C92.11 BCR/ABL1-POSITIVE CHRONIC MYELOID LEUKEMIA (CML) IN REMISSION: Primary | ICD-10-CM

## 2018-04-19 RX ORDER — IMATINIB MESYLATE 400 MG/1
400 TABLET ORAL DAILY
Qty: 30 TABLET | Refills: 1 | Status: SHIPPED | OUTPATIENT
Start: 2018-04-19 | End: 2018-05-17 | Stop reason: SDUPTHER

## 2018-04-19 NOTE — TELEPHONE ENCOUNTER
----- Message from Sera Celeste sent at 4/18/2018  4:50 PM CDT -----  Contact: yumi [phone]            Name of Who is Calling: yumi [phone]      What is the request in detail: needs GLEEVEC 400 mg Tab refill      Can the clinic reply by [facility]: no      What Number [facility]: yumi [phone]

## 2018-04-19 NOTE — TELEPHONE ENCOUNTER
Notified the patient we received the prescription for Gleevec, and will need to complete a benefits investigation with the insurance company. Patient voiced understanding.

## 2018-05-08 NOTE — PROGRESS NOTES
"Chief Complaint :  CML.    Hx of Present illness :  Patient is a 73 y.o. year old female who presents to the clinic today for  Hematology followup.  On Gleevec   Appetite fair; Has Urinary  Incontinence. No more loose stools.   Has Dyspnea related to COPD. Under Care of Pulmonologist .  No chest or abdominal pain.  Sx. No overt bleeding. Energy fair.  Having trouble with  Rotator Cuff left Shoulder. Presently on Gleevec 400 Mg daily.  Complains of soreness Left neck.  had done tests for COPD No headache, dizziness, nausea, vomiting. No overt bleeding. Energy fair.       Allergies :    Review of patient's allergies indicates:   Allergen Reactions    Morphine      Pt states, "I'm not allergic to morphine they gave me too much at [facility]. Instead of giving me 2mg she gave me 5mg and I was almost dead."     Codeine      Blisters vs sweling (pt unsure which one)    Iodine containing multivitamin      Blisters vs swelling (pt unsure which one.)    Lidocaine        Occupation :  Homemaker    Transfusion :  None    Menstrual & obstetric Hx :  6, Para 6  Age of menarche: 13  Age of first pregnancy: 18  Lactation history: No  Age of menopause: Hysterectomy at age 33, for Fibroids and Bleeding  HRT:  None    Present Meds :  Reviewed  Medication List with Changes/Refills   Current Medications    ADVAIR DISKUS 100-50 MCG/DOSE DISKUS INHALER    Take 1 puff by mouth Twice daily.    AMLODIPINE (NORVASC) 10 MG TABLET    Take 1 tablet (10 mg total) by mouth once daily.    ASPIRIN (ECOTRIN) 81 MG EC TABLET    Take 81 mg by mouth once daily.      CALCIUM-VITAMIN D (CALCIUM-VITAMIN D) 500 MG(1,250MG) -200 UNIT PER TABLET    Take 1 tablet by mouth 2 (two) times daily with meals.      CLONIDINE (CATAPRES) 0.1 MG TABLET    Take 1 tablet (0.1 mg total) by mouth 2 (two) times daily.    CLOPIDOGREL (PLAVIX) 75 MG TABLET    Take 1 tablet (75 mg total) by mouth once daily.    CRESTOR 20 MG TABLET    Take 10 mg by " mouth every evening.     ERGOCALCIFEROL (ERGOCALCIFEROL) 50,000 UNIT CAP    Take 50,000 Units by mouth every 7 days.      FUROSEMIDE (LASIX) 40 MG TABLET    Take 1 tablet (40 mg total) by mouth once daily.    GABAPENTIN (NEURONTIN) 300 MG CAPSULE        GLEEVEC 400 MG TAB    Take 1 tablet (400 mg total) by mouth once daily.    HYDROCODONE-ACETAMINOPHEN 5-325MG (NORCO) 5-325 MG PER TABLET    Take 1 tablet by mouth 3 (three) times daily as needed.     IMATINIB (GLEEVEC) 100 MG TAB    Take 400 mg by mouth once daily.    INSULIN GLARGINE (LANTUS) 100 UNIT/ML INJECTION    Inject 25 Units into the skin every evening.    ISOSORBIDE MONONITRATE (IMDUR) 30 MG 24 HR TABLET    Take 1 tablet (30 mg total) by mouth once daily.    LEVETIRACETAM (KEPPRA) 500 MG TAB    Take 2 tablets by mouth Twice daily.    LEVOFLOXACIN (LEVAQUIN) 750 MG TABLET        LISINOPRIL (PRINIVIL,ZESTRIL) 40 MG TABLET    Take 1 tablet (40 mg total) by mouth once daily.    METOPROLOL SUCCINATE (TOPROL-XL) 50 MG 24 HR TABLET    Take 1 tablet (50 mg total) by mouth once daily.    NITROGLYCERIN (NITROSTAT) 0.4 MG SL TABLET    Place 1 tablet (0.4 mg total) under the tongue every 5 (five) minutes as needed for Chest pain.    PREDNISOLONE ACETATE (PRED FORTE) 1 % DRPS        PREDNISONE (DELTASONE) 20 MG TABLET        RANOLAZINE (RANEXA) 500 MG TB12    Take 500 mg by mouth 2 (two) times daily.    TIOTROPIUM (SPIRIVA) 18 MCG INHALATION CAPSULE    Inhale 18 mcg into the lungs once daily.      VENTOLIN HFA 90 MCG/ACTUATION HFAA    Inhale 2 puffs into the lungs every 4 to 6 hours as needed.       Past Medical Hx : Known to Have DM, HTN, CML, COPD, Hyperlipidemia    Past Medical Hx :  Past Medical History:   Diagnosis Date    Asthma     Cancer 10/10/2014    Bone cancer     COPD (chronic obstructive pulmonary disease)     Diabetes mellitus     Diabetes mellitus type II     Emphysema of lung     Hypercholesteremia     Hypertension     Myocardial infarct  12/25/2013    Palpitations     PVD (peripheral vascular disease)     Seizures     Stroke        Travel Hx :  N/A    Immunization :  Immunization History   Administered Date(s) Administered    Influenza - High Dose 11/04/2014       Family Hx :  Family History   Problem Relation Age of Onset    Diabetes Mother     Asthma Mother     Cancer Father     Breast cancer Sister     Colon cancer Neg Hx     Ovarian cancer Neg Hx        Social Hx :  Social History     Social History    Marital status:      Spouse name: N/A    Number of children: N/A    Years of education: N/A     Occupational History    Not on file.     Social History Main Topics    Smoking status: Current Some Day Smoker     Packs/day: 0.50     Years: 56.00     Types: Cigarettes    Smokeless tobacco: Never Used    Alcohol use No    Drug use: No    Sexual activity: No     Other Topics Concern    Not on file     Social History Narrative    No narrative on file       Surgery : C. Section.  Hysterectomy. Appendectomy. Cholecystectomy. Colonoscopy Cataract Surgery    Symptoms :    Review of Systems   Constitutional: Negative for chills, diaphoresis, fever, malaise/fatigue and weight loss.   HENT: Positive for hearing loss. Negative for congestion, ear discharge, ear pain, nosebleeds, sinus pain and tinnitus.    Eyes: Negative for blurred vision, double vision, photophobia, pain, discharge and redness.   Respiratory: Positive for shortness of breath (Related to COPD). Negative for cough, hemoptysis, sputum production, wheezing and stridor.    Cardiovascular: Negative for chest pain, palpitations, orthopnea, claudication, leg swelling and PND.   Gastrointestinal: Positive for nausea. Negative for abdominal pain, blood in stool, constipation, heartburn and vomiting.   Genitourinary: Positive for urgency. Negative for dysuria, flank pain, frequency and hematuria.   Musculoskeletal: Positive for joint pain and neck pain. Negative for back  pain, falls and myalgias.   Skin: Negative for itching and rash.   Neurological: Negative for dizziness, tingling, tremors, sensory change, speech change, focal weakness, seizures, loss of consciousness, weakness and headaches.   Endo/Heme/Allergies: Negative for polydipsia. Does not bruise/bleed easily.   Psychiatric/Behavioral: Negative for depression, hallucinations, memory loss, substance abuse and suicidal ideas. The patient is not nervous/anxious and does not have insomnia.        Physical Exam :   Physical Exam   Constitutional: She is oriented to person, place, and time and well-developed, well-nourished, and in no distress. No distress.   HENT:   Head: Normocephalic and atraumatic.   Right Ear: External ear normal.   Left Ear: External ear normal.   Nose: Nose normal.   Mouth/Throat: Oropharyngeal exudate present.   Eyes: Conjunctivae, EOM and lids are normal. Lids are everted and swept, no foreign bodies found. No scleral icterus. Pupils are unequal.       Neck: Normal range of motion. No JVD present. No tracheal deviation present. No thyromegaly present.   Cardiovascular: Normal rate, regular rhythm, normal heart sounds and intact distal pulses.    Pulmonary/Chest: Effort normal and breath sounds normal. No stridor. No respiratory distress. She has no wheezes. She has no rales. She exhibits no tenderness.   Abdominal: Soft. Bowel sounds are normal. She exhibits no distension and no mass. There is no tenderness. There is no rebound and no guarding.   Genitourinary:   Genitourinary Comments: Not Examined   Musculoskeletal:        Arms:  Lymphadenopathy:        Head (right side): No submental, no submandibular, no tonsillar, no preauricular, no posterior auricular and no occipital adenopathy present.        Head (left side): No submental, no submandibular, no tonsillar, no preauricular, no posterior auricular and no occipital adenopathy present.     She has no axillary adenopathy.        Right: No inguinal,  no supraclavicular and no epitrochlear adenopathy present.        Left: No inguinal, no supraclavicular and no epitrochlear adenopathy present.   Neurological: She is alert and oriented to person, place, and time. She has normal motor skills, normal sensation, normal strength, normal reflexes and intact cranial nerves. Gait normal. GCS score is 15.   Skin: Skin is warm, dry and intact. No rash noted. She is not diaphoretic. No cyanosis. No pallor. Nails show no clubbing.   Psychiatric: Mood, memory, affect and judgment normal.         Labs & Imaging :  03/07/18 : WBC 5,700. ANC 2,300 Hgb 11.9; Hct 37.6 NFBS 112; Cr. 0.9. Ca 9.6 Bili 0.4 ALP 92  05/08/18 : NFBS 96. Cr.0.82. Bili 0.3 ALP 76. Ca 9.5.  WBC 6,700. ANC 3,497. Hgb 12; Hct 37.5 normal indices. Plts 259,000      Dx :  CML.      Assessment & Plan:  Reviewed with Patient.  Continue Gleevec. Monitor Labs.  BCR/ABL today. RTC one month.       Distress Screening Results: Psychosocial Distress screening score of   noted and reviewed. No intervention indicated.

## 2018-05-09 ENCOUNTER — OFFICE VISIT (OUTPATIENT)
Dept: HEMATOLOGY/ONCOLOGY | Facility: CLINIC | Age: 73
End: 2018-05-09
Payer: MEDICARE

## 2018-05-09 VITALS
TEMPERATURE: 98 F | DIASTOLIC BLOOD PRESSURE: 81 MMHG | OXYGEN SATURATION: 99 % | SYSTOLIC BLOOD PRESSURE: 202 MMHG | HEART RATE: 88 BPM | WEIGHT: 141.75 LBS | BODY MASS INDEX: 24.33 KG/M2

## 2018-05-09 DIAGNOSIS — C92.10 CML (CHRONIC MYELOCYTIC LEUKEMIA): Primary | ICD-10-CM

## 2018-05-09 PROCEDURE — 3079F DIAST BP 80-89 MM HG: CPT | Mod: CPTII,S$GLB,, | Performed by: INTERNAL MEDICINE

## 2018-05-09 PROCEDURE — 99213 OFFICE O/P EST LOW 20 MIN: CPT | Mod: S$GLB,,, | Performed by: INTERNAL MEDICINE

## 2018-05-09 PROCEDURE — 3077F SYST BP >= 140 MM HG: CPT | Mod: CPTII,S$GLB,, | Performed by: INTERNAL MEDICINE

## 2018-05-09 PROCEDURE — 99999 PR PBB SHADOW E&M-EST. PATIENT-LVL IV: CPT | Mod: PBBFAC,,, | Performed by: INTERNAL MEDICINE

## 2018-05-09 RX ORDER — PEN NEEDLE, DIABETIC 31 GX5/16"
NEEDLE, DISPOSABLE MISCELLANEOUS
Refills: 0 | COMMUNITY
Start: 2018-05-02

## 2018-05-09 RX ORDER — UMECLIDINIUM BROMIDE AND VILANTEROL TRIFENATATE 62.5; 25 UG/1; UG/1
POWDER RESPIRATORY (INHALATION)
COMMUNITY
Start: 2018-04-20

## 2018-05-17 DIAGNOSIS — C92.11 BCR/ABL1-POSITIVE CHRONIC MYELOID LEUKEMIA (CML) IN REMISSION: ICD-10-CM

## 2018-05-17 RX ORDER — IMATINIB MESYLATE 400 MG/1
400 TABLET, FILM COATED ORAL DAILY
Qty: 30 TABLET | Refills: 2 | Status: SHIPPED | OUTPATIENT
Start: 2018-05-17 | End: 2018-01-01 | Stop reason: SDUPTHER

## 2018-05-17 RX ORDER — IMATINIB MESYLATE 400 MG/1
400 TABLET ORAL DAILY
Qty: 45 TABLET | Refills: 0 | Status: SHIPPED | OUTPATIENT
Start: 2018-05-17 | End: 2018-05-17 | Stop reason: CLARIF

## 2018-06-11 ENCOUNTER — OFFICE VISIT (OUTPATIENT)
Dept: HEMATOLOGY/ONCOLOGY | Facility: CLINIC | Age: 73
End: 2018-06-11
Payer: MEDICARE

## 2018-06-11 VITALS
OXYGEN SATURATION: 99 % | DIASTOLIC BLOOD PRESSURE: 82 MMHG | HEART RATE: 85 BPM | BODY MASS INDEX: 23.8 KG/M2 | TEMPERATURE: 98 F | WEIGHT: 138.69 LBS | SYSTOLIC BLOOD PRESSURE: 171 MMHG

## 2018-06-11 DIAGNOSIS — C92.10 CML (CHRONIC MYELOCYTIC LEUKEMIA): Primary | ICD-10-CM

## 2018-06-11 PROCEDURE — 99999 PR PBB SHADOW E&M-EST. PATIENT-LVL III: CPT | Mod: PBBFAC,,, | Performed by: INTERNAL MEDICINE

## 2018-06-11 PROCEDURE — 3077F SYST BP >= 140 MM HG: CPT | Mod: CPTII,S$GLB,, | Performed by: INTERNAL MEDICINE

## 2018-06-11 PROCEDURE — 99213 OFFICE O/P EST LOW 20 MIN: CPT | Mod: S$GLB,,, | Performed by: INTERNAL MEDICINE

## 2018-06-11 PROCEDURE — 3079F DIAST BP 80-89 MM HG: CPT | Mod: CPTII,S$GLB,, | Performed by: INTERNAL MEDICINE

## 2018-06-11 RX ORDER — ATORVASTATIN CALCIUM 80 MG/1
TABLET, FILM COATED ORAL
Refills: 3 | COMMUNITY
Start: 2018-05-21

## 2018-06-11 RX ORDER — CLOPIDOGREL BISULFATE 75 MG/1
TABLET ORAL
Refills: 1 | COMMUNITY
Start: 2018-05-21

## 2018-06-11 RX ORDER — FAMOTIDINE 40 MG/1
TABLET, FILM COATED ORAL
COMMUNITY
Start: 2018-05-23

## 2018-06-11 RX ORDER — LISINOPRIL 20 MG/1
TABLET ORAL
Refills: 1 | COMMUNITY
Start: 2018-05-21 | End: 2018-01-01 | Stop reason: SDUPTHER

## 2018-06-11 RX ORDER — METOPROLOL TARTRATE 25 MG/1
TABLET, FILM COATED ORAL
Refills: 1 | COMMUNITY
Start: 2018-05-21

## 2018-06-11 RX ORDER — HYDRALAZINE HYDROCHLORIDE AND ISOSORBIDE DINITRATE 37.5; 2 MG/1; MG/1
TABLET, FILM COATED ORAL
Refills: 1 | COMMUNITY
Start: 2018-05-22

## 2018-06-11 NOTE — PROGRESS NOTES
"Chief Complaint :  CML.    Hx of Present illness :  Patient is a 73 y.o. year old female who presents to the clinic today for  Hematology followup.  On Gleevec   Appetite fair; Has Urinary  Incontinence. No more loose stools.   Has Dyspnea related to COPD. Under Care of Pulmonologist .  No chest or abdominal pain.  Sx. No overt bleeding. Energy fair.  Having trouble with  Rotator Cuff left Shoulder. Presently on Gleevec 400 Mg daily.  Complains of soreness Left neck.  had done tests for COPD No headache, dizziness, nausea, vomiting. No overt bleeding. Energy fair.  Back from visiting family in [place] over the weekend.      Allergies :    Review of patient's allergies indicates:   Allergen Reactions    Morphine      Pt states, "I'm not allergic to morphine they gave me too much at [facility]. Instead of giving me 2mg she gave me 5mg and I was almost dead."     Codeine      Blisters vs sweling (pt unsure which one)    Iodine containing multivitamin      Blisters vs swelling (pt unsure which one.)    Lidocaine        Occupation :  Homemaker    Transfusion :  None    Menstrual & obstetric Hx :  6, Para 6  Age of menarche: 13  Age of first pregnancy: 18  Lactation history: No  Age of menopause: Hysterectomy at age 33, for Fibroids and Bleeding  HRT:  None    Present Meds :  Reviewed  Medication List with Changes/Refills   Current Medications    ADVAIR DISKUS 100-50 MCG/DOSE DISKUS INHALER    Take 1 puff by mouth Twice daily.    AMLODIPINE (NORVASC) 10 MG TABLET    Take 1 tablet (10 mg total) by mouth once daily.    ANORO ELLIPTA 62.5-25 MCG/ACTUATION DSDV        ASPIRIN (ECOTRIN) 81 MG EC TABLET    Take 81 mg by mouth once daily.      BD ULTRA-FINE ANISHA PEN NEEDLE 32 GAUGE X " NDLE    use for insulin up to FOUR TIMES DAILY    CALCIUM-VITAMIN D (CALCIUM-VITAMIN D) 500 MG(1,250MG) -200 UNIT PER TABLET    Take 1 tablet by mouth 2 (two) times daily with meals.      CLONIDINE (CATAPRES) 0.1 MG " TABLET    Take 1 tablet (0.1 mg total) by mouth 2 (two) times daily.    CLOPIDOGREL (PLAVIX) 75 MG TABLET    Take 1 tablet (75 mg total) by mouth once daily.    CRESTOR 20 MG TABLET    Take 10 mg by mouth every evening.     ERGOCALCIFEROL (ERGOCALCIFEROL) 50,000 UNIT CAP    Take 50,000 Units by mouth every 7 days.      FUROSEMIDE (LASIX) 40 MG TABLET    Take 1 tablet (40 mg total) by mouth once daily.    GABAPENTIN (NEURONTIN) 300 MG CAPSULE        HYDROCODONE-ACETAMINOPHEN 5-325MG (NORCO) 5-325 MG PER TABLET    Take 1 tablet by mouth 3 (three) times daily as needed.     IMATINIB (GLEEVEC) 400 MG TAB    Take 1 tablet (400 mg total) by mouth once daily.    INSULIN GLARGINE (LANTUS) 100 UNIT/ML INJECTION    Inject 25 Units into the skin every evening.    ISOSORBIDE MONONITRATE (IMDUR) 30 MG 24 HR TABLET    Take 1 tablet (30 mg total) by mouth once daily.    LEVETIRACETAM (KEPPRA) 500 MG TAB    Take 2 tablets by mouth Twice daily.    LEVOFLOXACIN (LEVAQUIN) 750 MG TABLET        LISINOPRIL (PRINIVIL,ZESTRIL) 40 MG TABLET    Take 1 tablet (40 mg total) by mouth once daily.    METOPROLOL SUCCINATE (TOPROL-XL) 50 MG 24 HR TABLET    Take 1 tablet (50 mg total) by mouth once daily.    NITROGLYCERIN (NITROSTAT) 0.4 MG SL TABLET    Place 1 tablet (0.4 mg total) under the tongue every 5 (five) minutes as needed for Chest pain.    PREDNISOLONE ACETATE (PRED FORTE) 1 % DRPS        PREDNISONE (DELTASONE) 20 MG TABLET        RANOLAZINE (RANEXA) 500 MG TB12    Take 500 mg by mouth 2 (two) times daily.    TIOTROPIUM (SPIRIVA) 18 MCG INHALATION CAPSULE    Inhale 18 mcg into the lungs once daily.      VENTOLIN HFA 90 MCG/ACTUATION HFAA    Inhale 2 puffs into the lungs every 4 to 6 hours as needed.       Past Medical Hx : Known to Have DM, HTN, CML, COPD, Hyperlipidemia    Past Medical Hx :  Past Medical History:   Diagnosis Date    Asthma     Cancer 10/10/2014    Bone cancer     COPD (chronic obstructive pulmonary disease)      Diabetes mellitus     Diabetes mellitus type II     Emphysema of lung     Hypercholesteremia     Hypertension     Myocardial infarct 12/25/2013    Palpitations     PVD (peripheral vascular disease)     Seizures     Stroke        Travel Hx :  N/A    Immunization :  Immunization History   Administered Date(s) Administered    Influenza - High Dose 11/04/2014       Family Hx :  Family History   Problem Relation Age of Onset    Diabetes Mother     Asthma Mother     Cancer Father     Breast cancer Sister     Colon cancer Neg Hx     Ovarian cancer Neg Hx        Social Hx :  Social History     Social History    Marital status:      Spouse name: N/A    Number of children: N/A    Years of education: N/A     Occupational History    Not on file.     Social History Main Topics    Smoking status: Current Some Day Smoker     Packs/day: 0.50     Years: 56.00     Types: Cigarettes    Smokeless tobacco: Never Used    Alcohol use No    Drug use: No    Sexual activity: No     Other Topics Concern    Not on file     Social History Narrative    No narrative on file       Surgery : C. Section.  Hysterectomy. Appendectomy. Cholecystectomy. Colonoscopy Cataract Surgery    Symptoms :   Has Sx related to peripheral vascular insufficiency.    Review of Systems   Constitutional: Negative for chills, diaphoresis, fever, malaise/fatigue and weight loss.   HENT: Positive for hearing loss. Negative for congestion, ear discharge, ear pain, nosebleeds, sinus pain and tinnitus.    Eyes: Negative for blurred vision, double vision, photophobia, pain, discharge and redness.   Respiratory: Positive for shortness of breath (Related to COPD). Negative for cough, hemoptysis, sputum production, wheezing and stridor.    Cardiovascular: Negative for chest pain, palpitations, orthopnea, claudication, leg swelling and PND.   Gastrointestinal: Positive for nausea. Negative for abdominal pain, blood in stool, constipation,  heartburn and vomiting.   Genitourinary: Positive for urgency. Negative for dysuria, flank pain, frequency and hematuria.   Musculoskeletal: Positive for joint pain and neck pain. Negative for back pain, falls and myalgias.   Skin: Negative for itching and rash.   Neurological: Negative for dizziness, tingling, tremors, sensory change, speech change, focal weakness, seizures, loss of consciousness, weakness and headaches.   Endo/Heme/Allergies: Negative for polydipsia. Does not bruise/bleed easily.   Psychiatric/Behavioral: Negative for depression, hallucinations, memory loss, substance abuse and suicidal ideas. The patient is not nervous/anxious and does not have insomnia.        Physical Exam :   Physical Exam   Constitutional: She is oriented to person, place, and time and well-developed, well-nourished, and in no distress. No distress.   HENT:   Head: Normocephalic and atraumatic.   Right Ear: External ear normal.   Left Ear: External ear normal.   Nose: Nose normal.   Mouth/Throat: Oropharyngeal exudate present.   Eyes: Conjunctivae, EOM and lids are normal. Lids are everted and swept, no foreign bodies found. No scleral icterus. Pupils are unequal.       Neck: Normal range of motion. No JVD present. No tracheal deviation present. No thyromegaly present.   Cardiovascular: Normal rate, regular rhythm, normal heart sounds and intact distal pulses.    Pulmonary/Chest: Effort normal and breath sounds normal. No stridor. No respiratory distress. She has no wheezes. She has no rales. She exhibits no tenderness.   Abdominal: Soft. Bowel sounds are normal. She exhibits no distension and no mass. There is no tenderness. There is no rebound and no guarding.   Genitourinary:   Genitourinary Comments: Not Examined   Musculoskeletal:        Arms:  Lymphadenopathy:        Head (right side): No submental, no submandibular, no tonsillar, no preauricular, no posterior auricular and no occipital adenopathy present.        Head  (left side): No submental, no submandibular, no tonsillar, no preauricular, no posterior auricular and no occipital adenopathy present.     She has no axillary adenopathy.        Right: No inguinal, no supraclavicular and no epitrochlear adenopathy present.        Left: No inguinal, no supraclavicular and no epitrochlear adenopathy present.   Neurological: She is alert and oriented to person, place, and time. She has normal motor skills, normal sensation, normal strength, normal reflexes and intact cranial nerves. Gait normal. GCS score is 15.   Skin: Skin is warm, dry and intact. No rash noted. She is not diaphoretic. No cyanosis. No pallor. Nails show no clubbing.   Psychiatric: Mood, memory, affect and judgment normal.         Labs & Imaging :  03/07/18 : WBC 5,700. ANC 2,300 Hgb 11.9; Hct 37.6 NFBS 112; Cr. 0.9. Ca 9.6 Bili 0.4 ALP 92  05/08/18 : NFBS 96. Cr.0.82. Bili 0.3 ALP 76. Ca 9.5.  WBC 6,700. ANC 3,497. Hgb 12; Hct 37.5 normal indices. Plts 259,000  06/05/18 :  NFBS 120; Cr.0.8; Bili 0.4 Ca 9.3 ALP 87;   WBC 9,400; ANC  5,546. Hgb 12.3; Hct 38.7; Plts 288,000.    Dx :  CML in remission.      Assessment & Plan:  Reviewed with Patient.  Continue Gleevec. 400 Mg  Once daily Monitor Labs.  RTC one month.       Distress Screening Results: Psychosocial Distress screening score of   noted and reviewed. No intervention indicated.

## 2018-08-27 NOTE — PROGRESS NOTES
"Chief Complaint :  CML.    Hx of Present illness :  Patient is a 73 y.o. year old female who presents to the clinic today for  Hematology followup.  On Gleevec   Appetite fair; Has Urinary  Incontinence. No more loose stools.   Has Dyspnea related to COPD. Under Care of Pulmonologist .  No chest or abdominal pain.  Sx. No overt bleeding. Energy fair.  Having trouble with  Rotator Cuff left Shoulder. Presently on Gleevec 400 Mg daily.  Complains of soreness Left neck.  had done tests for COPD No headache, dizziness, nausea, vomiting. No overt bleeding. Energy fair.  Back from visiting family in [place] over the weekend.      Allergies :    Review of patient's allergies indicates:   Allergen Reactions    Morphine      Pt states, "I'm not allergic to morphine they gave me too much at [facility]. Instead of giving me 2mg she gave me 5mg and I was almost dead."     Codeine      Blisters vs sweling (pt unsure which one)    Iodine containing multivitamin      Blisters vs swelling (pt unsure which one.)    Lidocaine        Occupation :  Homemaker    Transfusion :  None    Menstrual & obstetric Hx :  6, Para 6  Age of menarche: 13  Age of first pregnancy: 18  Lactation history: No  Age of menopause: Hysterectomy at age 33, for Fibroids and Bleeding  HRT:  None    Present Meds :  Reviewed  Medication List with Changes/Refills   Current Medications    ADVAIR DISKUS 100-50 MCG/DOSE DISKUS INHALER    Take 1 puff by mouth Twice daily.    AMLODIPINE (NORVASC) 10 MG TABLET    Take 1 tablet (10 mg total) by mouth once daily.    ANORO ELLIPTA 62.5-25 MCG/ACTUATION DSDV        ASPIRIN (ECOTRIN) 81 MG EC TABLET    Take 81 mg by mouth once daily.      ATORVASTATIN (LIPITOR) 80 MG TABLET    TK 1 T PO D    BD ULTRA-FINE ANISHA PEN NEEDLE 32 GAUGE X " NDLE    use for insulin up to FOUR TIMES DAILY    BIDIL 20-37.5 MG TAB    TK 1 T PO TID    CALCIUM-VITAMIN D (CALCIUM-VITAMIN D) 500 MG(1,250MG) -200 UNIT PER " TABLET    Take 1 tablet by mouth 2 (two) times daily with meals.      CLONIDINE (CATAPRES) 0.1 MG TABLET    Take 1 tablet (0.1 mg total) by mouth 2 (two) times daily.    CLOPIDOGREL (PLAVIX) 75 MG TABLET    TK 1 T PO D    CRESTOR 20 MG TABLET    Take 10 mg by mouth every evening.     ERGOCALCIFEROL (ERGOCALCIFEROL) 50,000 UNIT CAP    Take 50,000 Units by mouth every 7 days.      FAMOTIDINE (PEPCID) 40 MG TABLET        FUROSEMIDE (LASIX) 40 MG TABLET    Take 1 tablet (40 mg total) by mouth once daily.    GABAPENTIN (NEURONTIN) 300 MG CAPSULE        HYDROCODONE-ACETAMINOPHEN 5-325MG (NORCO) 5-325 MG PER TABLET    Take 1 tablet by mouth 3 (three) times daily as needed.     IMATINIB (GLEEVEC) 400 MG TAB    Take 1 tablet (400 mg total) by mouth once daily.    INSULIN GLARGINE (LANTUS) 100 UNIT/ML INJECTION    Inject 25 Units into the skin every evening.    ISOSORBIDE MONONITRATE (IMDUR) 30 MG 24 HR TABLET    Take 1 tablet (30 mg total) by mouth once daily.    LEVETIRACETAM (KEPPRA) 500 MG TAB    Take 2 tablets by mouth Twice daily.    LEVOFLOXACIN (LEVAQUIN) 750 MG TABLET        LISINOPRIL (PRINIVIL,ZESTRIL) 20 MG TABLET    TK 1 T PO D    LISINOPRIL (PRINIVIL,ZESTRIL) 40 MG TABLET    Take 1 tablet (40 mg total) by mouth once daily.    METOPROLOL TARTRATE (LOPRESSOR) 25 MG TABLET    TK 1 T PO BID    NITROGLYCERIN (NITROSTAT) 0.4 MG SL TABLET    Place 1 tablet (0.4 mg total) under the tongue every 5 (five) minutes as needed for Chest pain.    PREDNISOLONE ACETATE (PRED FORTE) 1 % DRPS        PREDNISONE (DELTASONE) 20 MG TABLET        RANITIDINE (ZANTAC) 300 MG TABLET    TK 1 T PO QHS    RANOLAZINE (RANEXA) 500 MG TB12    Take 500 mg by mouth 2 (two) times daily.    TIOTROPIUM (SPIRIVA) 18 MCG INHALATION CAPSULE    Inhale 18 mcg into the lungs once daily.      VENTOLIN HFA 90 MCG/ACTUATION HFAA    Inhale 2 puffs into the lungs every 4 to 6 hours as needed.       Past Medical Hx : Known to Have DM, HTN, CML, COPD,  Hyperlipidemia    Past Medical Hx :  Past Medical History:   Diagnosis Date    Asthma     Cancer 10/10/2014    Bone cancer     COPD (chronic obstructive pulmonary disease)     Diabetes mellitus     Diabetes mellitus type II     Emphysema of lung     Hypercholesteremia     Hypertension     Myocardial infarct 12/25/2013    Palpitations     PVD (peripheral vascular disease)     Seizures     Stroke        Travel Hx :  N/A    Immunization :  Immunization History   Administered Date(s) Administered    Influenza - High Dose 11/04/2014       Family Hx :  Family History   Problem Relation Age of Onset    Diabetes Mother     Asthma Mother     Cancer Father     Breast cancer Sister     Colon cancer Neg Hx     Ovarian cancer Neg Hx        Social Hx :  Social History     Socioeconomic History    Marital status:      Spouse name: Not on file    Number of children: Not on file    Years of education: Not on file    Highest education level: Not on file   Social Needs    Financial resource strain: Not on file    Food insecurity - worry: Not on file    Food insecurity - inability: Not on file    Transportation needs - medical: Not on file    Transportation needs - non-medical: Not on file   Occupational History    Not on file   Tobacco Use    Smoking status: Current Some Day Smoker     Packs/day: 0.50     Years: 56.00     Pack years: 28.00     Types: Cigarettes    Smokeless tobacco: Never Used   Substance and Sexual Activity    Alcohol use: No    Drug use: No    Sexual activity: No   Other Topics Concern    Not on file   Social History Narrative    Not on file       Surgery : C. Section.  Hysterectomy. Appendectomy. Cholecystectomy. Colonoscopy Cataract Surgery    Symptoms :   Has Sx related to peripheral vascular insufficiency.    Review of Systems   Constitutional: Positive for malaise/fatigue. Negative for chills, diaphoresis, fever and weight loss.   HENT: Positive for congestion and  hearing loss. Negative for ear discharge, ear pain, nosebleeds, sinus pain and tinnitus.    Eyes: Negative for blurred vision, double vision, photophobia, pain, discharge and redness.   Respiratory: Positive for shortness of breath (Related to COPD). Negative for cough, hemoptysis, sputum production, wheezing and stridor.    Cardiovascular: Negative for chest pain, palpitations, orthopnea, claudication, leg swelling and PND.   Gastrointestinal: Positive for nausea. Negative for abdominal pain, blood in stool, constipation, heartburn and vomiting.   Genitourinary: Positive for urgency. Negative for dysuria, flank pain, frequency and hematuria.   Musculoskeletal: Positive for joint pain and neck pain. Negative for back pain, falls and myalgias.   Skin: Negative for itching and rash.   Neurological: Negative for dizziness, tingling, tremors, sensory change, speech change, focal weakness, seizures, loss of consciousness, weakness and headaches.   Endo/Heme/Allergies: Negative for polydipsia. Does not bruise/bleed easily.   Psychiatric/Behavioral: Negative for depression, hallucinations, memory loss, substance abuse and suicidal ideas. The patient is not nervous/anxious and does not have insomnia.        Physical Exam :   Physical Exam   Constitutional: She is oriented to person, place, and time and well-developed, well-nourished, and in no distress. No distress.   HENT:   Head: Normocephalic and atraumatic.   Right Ear: External ear normal.   Left Ear: External ear normal.   Nose: Nose normal.   Mouth/Throat: Oropharyngeal exudate present.   Eyes: Conjunctivae, EOM and lids are normal. Lids are everted and swept, no foreign bodies found. No scleral icterus. Pupils are unequal.       Neck: Normal range of motion. No JVD present. No tracheal deviation present. No thyromegaly present.   Cardiovascular: Normal rate, regular rhythm, normal heart sounds and intact distal pulses.   Pulmonary/Chest: Effort normal and breath  sounds normal. No stridor. No respiratory distress. She has no wheezes. She has no rales. She exhibits no tenderness.   Abdominal: Soft. Bowel sounds are normal. She exhibits no distension and no mass. There is no tenderness. There is no rebound and no guarding.   Genitourinary:   Genitourinary Comments: Not Examined   Musculoskeletal:        Arms:  Lymphadenopathy:        Head (right side): No submental, no submandibular, no tonsillar, no preauricular, no posterior auricular and no occipital adenopathy present.        Head (left side): No submental, no submandibular, no tonsillar, no preauricular, no posterior auricular and no occipital adenopathy present.     She has no axillary adenopathy.        Right: No inguinal, no supraclavicular and no epitrochlear adenopathy present.        Left: No inguinal, no supraclavicular and no epitrochlear adenopathy present.   Neurological: She is alert and oriented to person, place, and time. She has normal motor skills, normal sensation, normal strength, normal reflexes and intact cranial nerves. Gait normal. GCS score is 15.   Skin: Skin is warm, dry and intact. No rash noted. She is not diaphoretic. No cyanosis. No pallor. Nails show no clubbing.   Psychiatric: Mood, memory, affect and judgment normal.         Labs & Imaging :  03/07/18 : WBC 5,700. ANC 2,300 Hgb 11.9; Hct 37.6 NFBS 112; Cr. 0.9. Ca 9.6 Bili 0.4 ALP 92  05/08/18 : NFBS 96. Cr.0.82. Bili 0.3 ALP 76. Ca 9.5.  WBC 6,700. ANC 3,497. Hgb 12; Hct 37.5 normal indices. Plts 259,000  06/05/18 :  NFBS 120; Cr.0.8; Bili 0.4 Ca 9.3 ALP 87;   WBC 9,400; ANC  5,546. Hgb 12.3; Hct 38.7; Plts 288,000.  08/14/18 : NFBS 151; Cr.0.74. Ca 9.5  Bili 0.4 ALP 74. WBC 15,500, Hgb 10.9; Hct 26.4 ANC 10,850. Plts 377,000.     Dx :  CML in remission.      Assessment & Plan:  Reviewed with Patient. WBC count gone up. Patient has been on Prednisone for COPD  Continue Gleevec. 400 Mg  Once daily Monitor Labs. If Counts continue to rise.   Recheck BCR/ABL PCR.  RTC one month. Patient understands and verbalised.      Distress Screening Results: Psychosocial Distress screening score of   noted and reviewed. No intervention indicated.

## 2018-09-25 NOTE — PROGRESS NOTES
"Chief Complaint :  CML.    Hx of Present illness :  Patient is a 73 y.o. year old female who presents to the clinic today for  Hematology followup.  On Gleevec   Appetite fair; Has Urinary  Incontinence. No more loose stools.   Has Dyspnea related to COPD. Under Care of Pulmonologist .  No chest or abdominal pain.  Sx. No overt bleeding. Energy fair.  Having trouble with  Rotator Cuff left Shoulder. Presently on Gleevec 400 Mg daily.  Complains of soreness Left neck.  had done tests for COPD No headache, dizziness, nausea, vomiting. No overt bleeding. Energy fair.  Back from visiting family in [place] over the weekend.      Allergies :    Review of patient's allergies indicates:   Allergen Reactions    Morphine      Pt states, "I'm not allergic to morphine they gave me too much at [facility]. Instead of giving me 2mg she gave me 5mg and I was almost dead."     Codeine      Blisters vs sweling (pt unsure which one)    Iodine containing multivitamin      Blisters vs swelling (pt unsure which one.)    Lidocaine        Occupation :  Homemaker    Transfusion :  None    Menstrual & obstetric Hx :  6, Para 6  Age of menarche: 13  Age of first pregnancy: 18  Lactation history: No  Age of menopause: Hysterectomy at age 33, for Fibroids and Bleeding  HRT:  None    Present Meds :  Reviewed     Medication List           Accurate as of 18  1:07 PM. If you have any questions, ask your nurse or doctor.               CONTINUE taking these medications    ADVAIR DISKUS 100-50 mcg/dose diskus inhaler  Generic drug:  fluticasone-salmeterol 100-50 mcg/dose     amLODIPine 10 MG tablet  Commonly known as:  NORVASC  Take 1 tablet (10 mg total) by mouth once daily.     ANORO ELLIPTA 62.5-25 mcg/actuation Dsdv  Generic drug:  umeclidinium-vilanterol     aspirin 81 MG EC tablet  Commonly known as:  ECOTRIN     atorvastatin 80 MG tablet  Commonly known as:  LIPITOR     BD ULTRA-FINE ANISHA PEN NEEDLE 32 gauge x " "5/32" Ndle  Generic drug:  pen needle, diabetic     BIDIL 20-37.5 mg Tab  Generic drug:  isosorbide-hydrALAZINE 20-37.5 mg     CALCIUM 500 + D 500 mg(1,250mg) -200 unit per tablet  Generic drug:  calcium-vitamin D3     cloNIDine 0.1 MG tablet  Commonly known as:  CATAPRES  Take 1 tablet (0.1 mg total) by mouth 2 (two) times daily.     clopidogrel 75 mg tablet  Commonly known as:  PLAVIX     CRESTOR 20 MG tablet  Generic drug:  rosuvastatin     ergocalciferol 50,000 unit Cap  Commonly known as:  ERGOCALCIFEROL     famotidine 40 MG tablet  Commonly known as:  PEPCID     furosemide 40 MG tablet  Commonly known as:  LASIX  Take 1 tablet (40 mg total) by mouth once daily.     gabapentin 300 MG capsule  Commonly known as:  NEURONTIN     HYDROcodone-acetaminophen 5-325 mg per tablet  Commonly known as:  NORCO     imatinib 400 MG Tab  Commonly known as:  GLEEVEC  Take 1 tablet (400 mg total) by mouth once daily.     insulin glargine 100 unit/mL injection  Commonly known as:  LANTUS     isosorbide mononitrate 30 MG 24 hr tablet  Commonly known as:  IMDUR  Take 1 tablet (30 mg total) by mouth once daily.     levETIRAcetam 500 MG Tab  Commonly known as:  KEPPRA     levoFLOXacin 750 MG tablet  Commonly known as:  LEVAQUIN     * lisinopril 40 MG tablet  Commonly known as:  PRINIVIL,ZESTRIL  Take 1 tablet (40 mg total) by mouth once daily.     * lisinopril 20 MG tablet  Commonly known as:  PRINIVIL,ZESTRIL     metoprolol tartrate 25 MG tablet  Commonly known as:  LOPRESSOR     nitroGLYCERIN 0.4 MG SL tablet  Commonly known as:  NITROSTAT  Place 1 tablet (0.4 mg total) under the tongue every 5 (five) minutes as needed for Chest pain.     prednisoLONE acetate 1 % Drps  Commonly known as:  PRED FORTE     predniSONE 20 MG tablet  Commonly known as:  DELTASONE     ranitidine 300 MG tablet  Commonly known as:  ZANTAC     ranolazine 500 MG Tb12  Commonly known as:  RANEXA     tiotropium 18 mcg inhalation capsule  Commonly known as:  " SPIRIVA     VENTOLIN HFA 90 mcg/actuation inhaler  Generic drug:  albuterol         * This list has 2 medication(s) that are the same as other medications prescribed for you. Read the directions carefully, and ask your doctor or other care provider to review them with you.                Past Medical Hx : Known to Have DM, HTN, CML, COPD, Hyperlipidemia    Past Medical Hx :  Past Medical History:   Diagnosis Date    Asthma     Cancer 10/10/2014    Bone cancer     COPD (chronic obstructive pulmonary disease)     Diabetes mellitus     Diabetes mellitus type II     Emphysema of lung     Hypercholesteremia     Hypertension     Myocardial infarct 12/25/2013    Palpitations     PVD (peripheral vascular disease)     Seizures     Stroke        Travel Hx :  N/A    Immunization :  Immunization History   Administered Date(s) Administered    Influenza - High Dose 11/04/2014       Family Hx :  Family History   Problem Relation Age of Onset    Diabetes Mother     Asthma Mother     Cancer Father     Breast cancer Sister     Colon cancer Neg Hx     Ovarian cancer Neg Hx        Social Hx :  Social History     Socioeconomic History    Marital status:      Spouse name: Not on file    Number of children: Not on file    Years of education: Not on file    Highest education level: Not on file   Social Needs    Financial resource strain: Not on file    Food insecurity - worry: Not on file    Food insecurity - inability: Not on file    Transportation needs - medical: Not on file    Transportation needs - non-medical: Not on file   Occupational History    Not on file   Tobacco Use    Smoking status: Current Some Day Smoker     Packs/day: 0.50     Years: 56.00     Pack years: 28.00     Types: Cigarettes    Smokeless tobacco: Never Used   Substance and Sexual Activity    Alcohol use: No    Drug use: No    Sexual activity: No   Other Topics Concern    Not on file   Social History Narrative    Not on  file       Surgery : C. Section.  Hysterectomy. Appendectomy. Cholecystectomy. Colonoscopy Cataract Surgery    Symptoms :   Has Sx related to peripheral vascular insufficiency.    Review of Systems   Constitutional: Positive for malaise/fatigue. Negative for chills, diaphoresis, fever and weight loss.   HENT: Positive for congestion and hearing loss. Negative for ear discharge, ear pain, nosebleeds, sinus pain and tinnitus.    Eyes: Negative for blurred vision, double vision, photophobia, pain, discharge and redness.   Respiratory: Positive for shortness of breath (Related to COPD). Negative for cough, hemoptysis, sputum production, wheezing and stridor.    Cardiovascular: Negative for chest pain, palpitations, orthopnea, claudication, leg swelling and PND.   Gastrointestinal: Positive for nausea. Negative for abdominal pain, blood in stool, constipation, heartburn and vomiting.   Genitourinary: Positive for urgency. Negative for dysuria, flank pain, frequency and hematuria.   Musculoskeletal: Positive for joint pain and neck pain. Negative for back pain, falls and myalgias.   Skin: Negative for itching and rash.        Dryness  Skin Left Shoulder   Neurological: Negative for dizziness, tingling, tremors, sensory change, speech change, focal weakness, seizures, loss of consciousness, weakness and headaches.   Endo/Heme/Allergies: Negative for polydipsia. Does not bruise/bleed easily.   Psychiatric/Behavioral: Negative for depression, hallucinations, memory loss, substance abuse and suicidal ideas. The patient is not nervous/anxious and does not have insomnia.        Physical Exam :   Physical Exam   Constitutional: She is oriented to person, place, and time and well-developed, well-nourished, and in no distress. No distress.   HENT:   Head: Normocephalic and atraumatic.   Right Ear: External ear normal.   Left Ear: External ear normal.   Nose: Nose normal.   Mouth/Throat: Oropharyngeal exudate present.   Eyes:  Conjunctivae, EOM and lids are normal. Lids are everted and swept, no foreign bodies found. No scleral icterus. Pupils are unequal.       Neck: Normal range of motion. No JVD present. No tracheal deviation present. No thyromegaly present.   Cardiovascular: Normal rate, regular rhythm, normal heart sounds and intact distal pulses.   Pulmonary/Chest: Effort normal and breath sounds normal. No stridor. No respiratory distress. She has no wheezes. She has no rales. She exhibits no tenderness.   Abdominal: Soft. Bowel sounds are normal. She exhibits no distension and no mass. There is no tenderness. There is no rebound and no guarding.   Genitourinary:   Genitourinary Comments: Not Examined   Musculoskeletal:        Arms:  Lymphadenopathy:        Head (right side): No submental, no submandibular, no tonsillar, no preauricular, no posterior auricular and no occipital adenopathy present.        Head (left side): No submental, no submandibular, no tonsillar, no preauricular, no posterior auricular and no occipital adenopathy present.     She has no axillary adenopathy.        Right: No inguinal, no supraclavicular and no epitrochlear adenopathy present.        Left: No inguinal, no supraclavicular and no epitrochlear adenopathy present.   Neurological: She is alert and oriented to person, place, and time. She has normal motor skills, normal sensation, normal strength, normal reflexes and intact cranial nerves. Gait normal. GCS score is 15.   Skin: Skin is warm, dry and intact. No rash noted. She is not diaphoretic. No cyanosis. No pallor. Nails show no clubbing.   Psychiatric: Mood, memory, affect and judgment normal.         Labs & Imaging :  03/07/18 : WBC 5,700. ANC 2,300 Hgb 11.9; Hct 37.6 NFBS 112; Cr. 0.9. Ca 9.6 Bili 0.4 ALP 92  05/08/18 : NFBS 96. Cr.0.82. Bili 0.3 ALP 76. Ca 9.5.  WBC 6,700. ANC 3,497. Hgb 12; Hct 37.5 normal indices. Plts 259,000  06/05/18 :  NFBS 120; Cr.0.8; Bili 0.4 Ca 9.3 ALP 87;   WBC 9,400;  ANC  5,546. Hgb 12.3; Hct 38.7; Plts 288,000.  08/14/18 : NFBS 151; Cr.0.74. Ca 9.5  Bili 0.4 ALP 74. WBC 15,500, Hgb 10.9; Hct 26.4 ANC 10,850. Plts 377,000.   09/07/18 : NFBS 68; Cr.0.66;  Bili 0.4 Ca 9.3 ALP 86.   BC 15,800 Hgb 10.9; Hct 35; MCV 81.2  Plts 474,000.   ANC 12,000  Dx :  CML in remission.      Assessment & Plan:  Reviewed with Patient. . Patient has been on Prednisone for COPD  Continue Gleevec. 400 Mg  Once daily Monitor Labs. Followup with Dr.Glenn Johnson,  RTC one month. Patient understands and verbalised.      Distress Screening Results: Psychosocial Distress screening score of   noted and reviewed. No intervention indicated.

## 2018-09-27 NOTE — Clinical Note
Labs before next visit at [facility]. Give KERRIE number for Dr.Glenn Johnson [facility]

## 2019-01-01 ENCOUNTER — HOSPITAL ENCOUNTER (EMERGENCY)
Facility: HOSPITAL | Age: 74
End: 2019-07-16
Attending: EMERGENCY MEDICINE
Payer: MEDICARE

## 2019-01-01 ENCOUNTER — HOSPITAL ENCOUNTER (INPATIENT)
Facility: HOSPITAL | Age: 74
LOS: 4 days | Discharge: HOME HEALTH | DRG: 246 | End: 2019-07-03
Attending: EMERGENCY MEDICINE | Admitting: INTERNAL MEDICINE
Payer: MEDICARE

## 2019-01-01 ENCOUNTER — HOSPITAL ENCOUNTER (EMERGENCY)
Facility: HOSPITAL | Age: 74
Discharge: HOME | End: 2019-05-30
Attending: EMERGENCY MEDICINE
Payer: MEDICARE

## 2019-01-01 ENCOUNTER — PATIENT OUTREACH (OUTPATIENT)
Dept: ADMINISTRATIVE | Facility: CLINIC | Age: 74
End: 2019-01-01

## 2019-01-01 ENCOUNTER — EXTERNAL HOME HEALTH (OUTPATIENT)
Dept: HOME HEALTH SERVICES | Facility: HOSPITAL | Age: 74
End: 2019-01-01

## 2019-01-01 ENCOUNTER — HOSPITAL ENCOUNTER (OUTPATIENT)
Facility: HOSPITAL | Age: 74
LOS: 1 days | Discharge: HOME HEALTH | End: 2019-07-05
Attending: EMERGENCY MEDICINE | Admitting: EMERGENCY MEDICINE
Payer: MEDICARE

## 2019-01-01 ENCOUNTER — OFFICE VISIT (OUTPATIENT)
Dept: HEMATOLOGY/ONCOLOGY | Facility: CLINIC | Age: 74
End: 2019-01-01
Payer: MEDICARE

## 2019-01-01 ENCOUNTER — OFFICE VISIT (OUTPATIENT)
Dept: CARDIOLOGY | Facility: CLINIC | Age: 74
End: 2019-01-01
Payer: MEDICARE

## 2019-01-01 ENCOUNTER — LAB VISIT (OUTPATIENT)
Dept: LAB | Facility: HOSPITAL | Age: 74
End: 2019-01-01
Attending: INTERNAL MEDICINE
Payer: MEDICARE

## 2019-01-01 ENCOUNTER — HOSPITAL ENCOUNTER (OUTPATIENT)
Facility: HOSPITAL | Age: 74
Discharge: HOME | End: 2019-02-05
Attending: EMERGENCY MEDICINE | Admitting: EMERGENCY MEDICINE
Payer: MEDICARE

## 2019-01-01 ENCOUNTER — HOSPITAL ENCOUNTER (INPATIENT)
Facility: HOSPITAL | Age: 74
LOS: 3 days | Discharge: HOME HEALTH | DRG: 193 | End: 2019-03-10
Attending: EMERGENCY MEDICINE | Admitting: EMERGENCY MEDICINE
Payer: MEDICARE

## 2019-01-01 ENCOUNTER — HOSPITAL ENCOUNTER (INPATIENT)
Facility: HOSPITAL | Age: 74
LOS: 2 days | Discharge: HOME HEALTH | DRG: 247 | End: 2019-06-15
Attending: EMERGENCY MEDICINE | Admitting: EMERGENCY MEDICINE
Payer: MEDICARE

## 2019-01-01 VITALS
TEMPERATURE: 99 F | HEART RATE: 101 BPM | WEIGHT: 139 LBS | OXYGEN SATURATION: 100 % | RESPIRATION RATE: 18 BRPM | SYSTOLIC BLOOD PRESSURE: 135 MMHG | DIASTOLIC BLOOD PRESSURE: 78 MMHG | BODY MASS INDEX: 23.73 KG/M2 | HEIGHT: 64 IN

## 2019-01-01 VITALS
DIASTOLIC BLOOD PRESSURE: 60 MMHG | TEMPERATURE: 98 F | BODY MASS INDEX: 22.88 KG/M2 | OXYGEN SATURATION: 100 % | WEIGHT: 134 LBS | HEIGHT: 64 IN | RESPIRATION RATE: 19 BRPM | HEART RATE: 62 BPM | SYSTOLIC BLOOD PRESSURE: 121 MMHG

## 2019-01-01 VITALS
HEART RATE: 91 BPM | WEIGHT: 127 LBS | SYSTOLIC BLOOD PRESSURE: 143 MMHG | RESPIRATION RATE: 20 BRPM | DIASTOLIC BLOOD PRESSURE: 60 MMHG | BODY MASS INDEX: 21.68 KG/M2 | HEIGHT: 64 IN | OXYGEN SATURATION: 98 % | TEMPERATURE: 98 F

## 2019-01-01 VITALS
HEART RATE: 107 BPM | OXYGEN SATURATION: 100 % | BODY MASS INDEX: 22.88 KG/M2 | SYSTOLIC BLOOD PRESSURE: 133 MMHG | DIASTOLIC BLOOD PRESSURE: 78 MMHG | RESPIRATION RATE: 19 BRPM | TEMPERATURE: 98 F | HEIGHT: 64 IN | WEIGHT: 134 LBS

## 2019-01-01 VITALS
HEART RATE: 68 BPM | BODY MASS INDEX: 22.41 KG/M2 | OXYGEN SATURATION: 96 % | HEIGHT: 65 IN | TEMPERATURE: 98 F | DIASTOLIC BLOOD PRESSURE: 67 MMHG | SYSTOLIC BLOOD PRESSURE: 153 MMHG | WEIGHT: 134.5 LBS

## 2019-01-01 VITALS
BODY MASS INDEX: 21.33 KG/M2 | HEIGHT: 66 IN | HEART RATE: 75 BPM | OXYGEN SATURATION: 99 % | WEIGHT: 132.69 LBS | TEMPERATURE: 98 F | SYSTOLIC BLOOD PRESSURE: 161 MMHG | DIASTOLIC BLOOD PRESSURE: 72 MMHG

## 2019-01-01 VITALS
BODY MASS INDEX: 21.34 KG/M2 | TEMPERATURE: 98 F | OXYGEN SATURATION: 99 % | HEART RATE: 79 BPM | HEIGHT: 65 IN | WEIGHT: 128.06 LBS | SYSTOLIC BLOOD PRESSURE: 138 MMHG | DIASTOLIC BLOOD PRESSURE: 64 MMHG

## 2019-01-01 VITALS
BODY MASS INDEX: 21.68 KG/M2 | WEIGHT: 127 LBS | RESPIRATION RATE: 8 BRPM | TEMPERATURE: 98 F | HEART RATE: 122 BPM | HEIGHT: 64 IN | OXYGEN SATURATION: 92 % | DIASTOLIC BLOOD PRESSURE: 73 MMHG | SYSTOLIC BLOOD PRESSURE: 154 MMHG

## 2019-01-01 VITALS
WEIGHT: 134.5 LBS | DIASTOLIC BLOOD PRESSURE: 65 MMHG | HEART RATE: 78 BPM | OXYGEN SATURATION: 100 % | HEIGHT: 64 IN | BODY MASS INDEX: 22.96 KG/M2 | SYSTOLIC BLOOD PRESSURE: 151 MMHG

## 2019-01-01 VITALS
OXYGEN SATURATION: 100 % | BODY MASS INDEX: 23.49 KG/M2 | DIASTOLIC BLOOD PRESSURE: 65 MMHG | HEIGHT: 64 IN | TEMPERATURE: 98 F | SYSTOLIC BLOOD PRESSURE: 143 MMHG | WEIGHT: 137.56 LBS | HEART RATE: 75 BPM | RESPIRATION RATE: 18 BRPM

## 2019-01-01 VITALS
BODY MASS INDEX: 23.22 KG/M2 | HEART RATE: 78 BPM | OXYGEN SATURATION: 98 % | RESPIRATION RATE: 20 BRPM | HEIGHT: 64 IN | TEMPERATURE: 98 F | DIASTOLIC BLOOD PRESSURE: 63 MMHG | WEIGHT: 136 LBS | SYSTOLIC BLOOD PRESSURE: 139 MMHG

## 2019-01-01 DIAGNOSIS — Z97.8 NASOGASTRIC TUBE PRESENT: ICD-10-CM

## 2019-01-01 DIAGNOSIS — J96.01 ACUTE RESPIRATORY FAILURE WITH HYPOXIA AND HYPERCAPNIA: ICD-10-CM

## 2019-01-01 DIAGNOSIS — C92.10 CML (CHRONIC MYELOCYTIC LEUKEMIA): ICD-10-CM

## 2019-01-01 DIAGNOSIS — I50.43 ACUTE ON CHRONIC COMBINED SYSTOLIC AND DIASTOLIC CONGESTIVE HEART FAILURE: ICD-10-CM

## 2019-01-01 DIAGNOSIS — J44.9 CHRONIC OBSTRUCTIVE PULMONARY DISEASE, UNSPECIFIED COPD TYPE: ICD-10-CM

## 2019-01-01 DIAGNOSIS — R06.02 SHORTNESS OF BREATH: ICD-10-CM

## 2019-01-01 DIAGNOSIS — I48.91 ATRIAL FIBRILLATION WITH RVR: Primary | ICD-10-CM

## 2019-01-01 DIAGNOSIS — Z86.73 HISTORY OF CVA (CEREBROVASCULAR ACCIDENT): Primary | Chronic | ICD-10-CM

## 2019-01-01 DIAGNOSIS — C92.10 CML (CHRONIC MYELOCYTIC LEUKEMIA): Primary | ICD-10-CM

## 2019-01-01 DIAGNOSIS — I25.10 CORONARY ARTERY DISEASE INVOLVING NATIVE CORONARY ARTERY OF NATIVE HEART WITHOUT ANGINA PECTORIS: Chronic | ICD-10-CM

## 2019-01-01 DIAGNOSIS — I50.43 ACUTE ON CHRONIC COMBINED SYSTOLIC AND DIASTOLIC CONGESTIVE HEART FAILURE: Primary | ICD-10-CM

## 2019-01-01 DIAGNOSIS — R53.81 DEBILITY: ICD-10-CM

## 2019-01-01 DIAGNOSIS — C92.11 BCR/ABL1-POSITIVE CHRONIC MYELOID LEUKEMIA (CML) IN REMISSION: ICD-10-CM

## 2019-01-01 DIAGNOSIS — R09.02 HYPOXIA: ICD-10-CM

## 2019-01-01 DIAGNOSIS — I20.0 UNSTABLE ANGINA PECTORIS: Primary | ICD-10-CM

## 2019-01-01 DIAGNOSIS — J44.1 COPD EXACERBATION: ICD-10-CM

## 2019-01-01 DIAGNOSIS — I21.4 NSTEMI (NON-ST ELEVATED MYOCARDIAL INFARCTION): ICD-10-CM

## 2019-01-01 DIAGNOSIS — G45.9 TIA (TRANSIENT ISCHEMIC ATTACK): ICD-10-CM

## 2019-01-01 DIAGNOSIS — R07.9 CHEST PAIN: ICD-10-CM

## 2019-01-01 DIAGNOSIS — J43.1 PANLOBULAR EMPHYSEMA: Chronic | ICD-10-CM

## 2019-01-01 DIAGNOSIS — R06.02 SHORTNESS OF BREATH: Primary | ICD-10-CM

## 2019-01-01 DIAGNOSIS — R06.02 SOB (SHORTNESS OF BREATH): ICD-10-CM

## 2019-01-01 DIAGNOSIS — J96.90 RESPIRATORY FAILURE: ICD-10-CM

## 2019-01-01 DIAGNOSIS — I50.23 ACUTE ON CHRONIC SYSTOLIC CONGESTIVE HEART FAILURE: Primary | ICD-10-CM

## 2019-01-01 DIAGNOSIS — I10 ESSENTIAL HYPERTENSION: Chronic | ICD-10-CM

## 2019-01-01 DIAGNOSIS — J96.02 ACUTE RESPIRATORY FAILURE WITH HYPOXIA AND HYPERCAPNIA: ICD-10-CM

## 2019-01-01 DIAGNOSIS — I50.9 ACUTE ON CHRONIC CONGESTIVE HEART FAILURE, UNSPECIFIED HEART FAILURE TYPE: ICD-10-CM

## 2019-01-01 DIAGNOSIS — I50.9 CHF (CONGESTIVE HEART FAILURE): ICD-10-CM

## 2019-01-01 DIAGNOSIS — I73.9 PVD (PERIPHERAL VASCULAR DISEASE): ICD-10-CM

## 2019-01-01 DIAGNOSIS — Z09 CHEMOTHERAPY FOLLOW-UP EXAMINATION: ICD-10-CM

## 2019-01-01 DIAGNOSIS — I21.9 ACUTE MI: ICD-10-CM

## 2019-01-01 DIAGNOSIS — J43.9 PULMONARY EMPHYSEMA, UNSPECIFIED EMPHYSEMA TYPE: ICD-10-CM

## 2019-01-01 DIAGNOSIS — R42 DIZZINESS: ICD-10-CM

## 2019-01-01 DIAGNOSIS — J18.9 PNEUMONIA OF LOWER LOBE DUE TO INFECTIOUS ORGANISM, UNSPECIFIED LATERALITY: Primary | ICD-10-CM

## 2019-01-01 DIAGNOSIS — I20.0 UNSTABLE ANGINA: ICD-10-CM

## 2019-01-01 DIAGNOSIS — I21.4 NSTEMI (NON-ST ELEVATED MYOCARDIAL INFARCTION): Primary | ICD-10-CM

## 2019-01-01 DIAGNOSIS — I25.10 CORONARY ARTERY DISEASE, ANGINA PRESENCE UNSPECIFIED, UNSPECIFIED VESSEL OR LESION TYPE, UNSPECIFIED WHETHER NATIVE OR TRANSPLANTED HEART: ICD-10-CM

## 2019-01-01 DIAGNOSIS — I49.01 VENTRICULAR FIBRILLATION: ICD-10-CM

## 2019-01-01 DIAGNOSIS — K21.9 GASTROESOPHAGEAL REFLUX DISEASE, ESOPHAGITIS PRESENCE NOT SPECIFIED: ICD-10-CM

## 2019-01-01 LAB
ALBUMIN SERPL BCP-MCNC: 3 G/DL (ref 3.5–5.2)
ALBUMIN SERPL BCP-MCNC: 3 G/DL (ref 3.5–5.2)
ALBUMIN SERPL BCP-MCNC: 3.2 G/DL (ref 3.5–5.2)
ALBUMIN SERPL BCP-MCNC: 3.4 G/DL (ref 3.5–5.2)
ALBUMIN SERPL BCP-MCNC: 3.5 G/DL
ALBUMIN SERPL BCP-MCNC: 3.6 G/DL (ref 3.5–5.2)
ALBUMIN SERPL BCP-MCNC: 3.7 G/DL
ALBUMIN SERPL BCP-MCNC: 3.8 G/DL
ALBUMIN SERPL BCP-MCNC: 4 G/DL (ref 3.5–5.2)
ALBUMIN SERPL BCP-MCNC: 4.3 G/DL (ref 3.5–5.2)
ALLENS TEST: ABNORMAL
ALP SERPL-CCNC: 59 U/L (ref 55–135)
ALP SERPL-CCNC: 59 U/L (ref 55–135)
ALP SERPL-CCNC: 61 U/L (ref 55–135)
ALP SERPL-CCNC: 67 U/L (ref 55–135)
ALP SERPL-CCNC: 69 U/L (ref 55–135)
ALP SERPL-CCNC: 71 U/L (ref 55–135)
ALP SERPL-CCNC: 72 U/L (ref 55–135)
ALP SERPL-CCNC: 78 U/L
ALP SERPL-CCNC: 79 U/L (ref 55–135)
ALP SERPL-CCNC: 85 U/L (ref 55–135)
ALP SERPL-CCNC: 87 U/L
ALP SERPL-CCNC: 89 U/L
ALT SERPL W/O P-5'-P-CCNC: 10 U/L (ref 10–44)
ALT SERPL W/O P-5'-P-CCNC: 10 U/L (ref 10–44)
ALT SERPL W/O P-5'-P-CCNC: 24 U/L
ALT SERPL W/O P-5'-P-CCNC: 26 U/L (ref 10–44)
ALT SERPL W/O P-5'-P-CCNC: 28 U/L (ref 10–44)
ALT SERPL W/O P-5'-P-CCNC: 47 U/L (ref 10–44)
ALT SERPL W/O P-5'-P-CCNC: 6 U/L (ref 10–44)
ALT SERPL W/O P-5'-P-CCNC: 69 U/L (ref 10–44)
ALT SERPL W/O P-5'-P-CCNC: 8 U/L
ALT SERPL W/O P-5'-P-CCNC: 8 U/L (ref 10–44)
ALT SERPL W/O P-5'-P-CCNC: 9 U/L
ALT SERPL W/O P-5'-P-CCNC: 92 U/L (ref 10–44)
ANION GAP SERPL CALC-SCNC: 10 MMOL/L
ANION GAP SERPL CALC-SCNC: 10 MMOL/L (ref 8–16)
ANION GAP SERPL CALC-SCNC: 10 MMOL/L (ref 8–16)
ANION GAP SERPL CALC-SCNC: 11 MMOL/L (ref 8–16)
ANION GAP SERPL CALC-SCNC: 12 MMOL/L (ref 8–16)
ANION GAP SERPL CALC-SCNC: 13 MMOL/L (ref 8–16)
ANION GAP SERPL CALC-SCNC: 13 MMOL/L (ref 8–16)
ANION GAP SERPL CALC-SCNC: 14 MMOL/L (ref 8–16)
ANION GAP SERPL CALC-SCNC: 15 MMOL/L
ANION GAP SERPL CALC-SCNC: 16 MMOL/L (ref 8–16)
ANION GAP SERPL CALC-SCNC: 17 MMOL/L (ref 8–16)
ANION GAP SERPL CALC-SCNC: 6 MMOL/L
ANION GAP SERPL CALC-SCNC: 6 MMOL/L (ref 8–16)
ANION GAP SERPL CALC-SCNC: 8 MMOL/L
ANION GAP SERPL CALC-SCNC: 8 MMOL/L
ANION GAP SERPL CALC-SCNC: 9 MMOL/L
ANION GAP SERPL CALC-SCNC: 9 MMOL/L
ANION GAP SERPL CALC-SCNC: 9 MMOL/L (ref 8–16)
ANISOCYTOSIS BLD QL SMEAR: SLIGHT
AORTIC ROOT ANNULUS: 2.67 CM
AORTIC ROOT ANNULUS: 2.93 CM
AORTIC ROOT ANNULUS: 3.19 CM
AORTIC VALVE CUSP SEPERATION: 1.5 CM
AORTIC VALVE CUSP SEPERATION: 1.63 CM
AORTIC VALVE CUSP SEPERATION: 1.72 CM
APTT BLDCRRT: 30.1 SEC (ref 21–32)
ASCENDING AORTA: 2.28 CM
ASCENDING AORTA: 2.41 CM
AST SERPL-CCNC: 115 U/L (ref 10–40)
AST SERPL-CCNC: 13 U/L (ref 10–40)
AST SERPL-CCNC: 14 U/L
AST SERPL-CCNC: 15 U/L
AST SERPL-CCNC: 15 U/L (ref 10–40)
AST SERPL-CCNC: 21 U/L (ref 10–40)
AST SERPL-CCNC: 22 U/L (ref 10–40)
AST SERPL-CCNC: 266 U/L (ref 10–40)
AST SERPL-CCNC: 28 U/L
AST SERPL-CCNC: 35 U/L (ref 10–40)
AST SERPL-CCNC: 51 U/L (ref 10–40)
AST SERPL-CCNC: 576 U/L (ref 10–40)
AV INDEX (PROSTH): 0.46
AV INDEX (PROSTH): 0.57
AV INDEX (PROSTH): 0.62
AV MEAN GRADIENT: 3 MMHG
AV MEAN GRADIENT: 3.64 MMHG
AV MEAN GRADIENT: 3.95 MMHG
AV PEAK GRADIENT: 4 MMHG
AV PEAK GRADIENT: 6.66 MMHG
AV PEAK GRADIENT: 7.4 MMHG
AV VALVE AREA: 1.44 CM2
AV VALVE AREA: 2.07 CM2
AV VALVE AREA: 2.19 CM2
AV VELOCITY RATIO: 0.4
AV VELOCITY RATIO: 0.57
AV VELOCITY RATIO: 0.68
BACTERIA BLD CULT: NORMAL
BASOPHILS # BLD AUTO: 0 K/UL (ref 0–0.2)
BASOPHILS # BLD AUTO: 0.01 K/UL
BASOPHILS # BLD AUTO: 0.01 K/UL (ref 0–0.2)
BASOPHILS # BLD AUTO: 0.02 K/UL (ref 0–0.2)
BASOPHILS # BLD AUTO: 0.02 K/UL (ref 0–0.2)
BASOPHILS # BLD AUTO: 0.03 K/UL (ref 0–0.2)
BASOPHILS # BLD AUTO: 0.05 K/UL
BASOPHILS # BLD AUTO: 0.05 K/UL
BASOPHILS # BLD AUTO: 0.06 K/UL (ref 0–0.2)
BASOPHILS # BLD AUTO: 0.09 K/UL
BASOPHILS # BLD AUTO: 0.16 K/UL (ref 0–0.2)
BASOPHILS # BLD AUTO: 0.5 K/UL
BASOPHILS NFR BLD: 0 %
BASOPHILS NFR BLD: 0 % (ref 0–1.9)
BASOPHILS NFR BLD: 0.1 %
BASOPHILS NFR BLD: 0.1 % (ref 0–1.9)
BASOPHILS NFR BLD: 0.2 %
BASOPHILS NFR BLD: 0.2 % (ref 0–1.9)
BASOPHILS NFR BLD: 0.2 % (ref 0–1.9)
BASOPHILS NFR BLD: 0.3 %
BASOPHILS NFR BLD: 0.5 %
BASOPHILS NFR BLD: 0.7 % (ref 0–1.9)
BASOPHILS NFR BLD: 2 % (ref 0–1.9)
BASOPHILS NFR BLD: 2.3 %
BASOPHILS NFR BLD: 5 %
BILIRUB SERPL-MCNC: 0.1 MG/DL (ref 0.1–1)
BILIRUB SERPL-MCNC: 0.2 MG/DL (ref 0.1–1)
BILIRUB SERPL-MCNC: 0.2 MG/DL (ref 0.1–1)
BILIRUB SERPL-MCNC: 0.3 MG/DL
BILIRUB SERPL-MCNC: 0.4 MG/DL
BILIRUB SERPL-MCNC: 0.4 MG/DL (ref 0.1–1)
BILIRUB SERPL-MCNC: 0.4 MG/DL (ref 0.1–1)
BILIRUB SERPL-MCNC: 0.5 MG/DL
BILIRUB SERPL-MCNC: 0.5 MG/DL (ref 0.1–1)
BILIRUB SERPL-MCNC: 0.6 MG/DL (ref 0.1–1)
BILIRUB SERPL-MCNC: 0.6 MG/DL (ref 0.1–1)
BILIRUB SERPL-MCNC: 0.7 MG/DL (ref 0.1–1)
BNP SERPL-MCNC: 1267 PG/ML
BNP SERPL-MCNC: 1322 PG/ML (ref 0–99)
BNP SERPL-MCNC: 2210 PG/ML (ref 0–99)
BNP SERPL-MCNC: 2656 PG/ML (ref 0–99)
BNP SERPL-MCNC: 483 PG/ML (ref 0–99)
BNP SERPL-MCNC: 731 PG/ML (ref 0–99)
BNP SERPL-MCNC: 820 PG/ML
BSA FOR ECHO PROCEDURE: 1.66 M2
BSA FOR ECHO PROCEDURE: 1.66 M2
BSA FOR ECHO PROCEDURE: 1.69 M2
BUN SERPL-MCNC: 11 MG/DL
BUN SERPL-MCNC: 11 MG/DL (ref 8–23)
BUN SERPL-MCNC: 14 MG/DL (ref 8–23)
BUN SERPL-MCNC: 14 MG/DL (ref 8–23)
BUN SERPL-MCNC: 15 MG/DL (ref 8–23)
BUN SERPL-MCNC: 17 MG/DL (ref 8–23)
BUN SERPL-MCNC: 17 MG/DL (ref 8–23)
BUN SERPL-MCNC: 20 MG/DL (ref 8–23)
BUN SERPL-MCNC: 21 MG/DL
BUN SERPL-MCNC: 22 MG/DL
BUN SERPL-MCNC: 22 MG/DL (ref 8–23)
BUN SERPL-MCNC: 24 MG/DL (ref 8–23)
BUN SERPL-MCNC: 26 MG/DL
BUN SERPL-MCNC: 29 MG/DL (ref 6–30)
BUN SERPL-MCNC: 30 MG/DL (ref 8–23)
BUN SERPL-MCNC: 32 MG/DL (ref 8–23)
BUN SERPL-MCNC: 34 MG/DL (ref 8–23)
BUN SERPL-MCNC: 40 MG/DL (ref 8–23)
BUN SERPL-MCNC: 8 MG/DL
BURR CELLS BLD QL SMEAR: ABNORMAL
CALCIUM SERPL-MCNC: 10.3 MG/DL (ref 8.7–10.5)
CALCIUM SERPL-MCNC: 8.4 MG/DL
CALCIUM SERPL-MCNC: 8.5 MG/DL (ref 8.7–10.5)
CALCIUM SERPL-MCNC: 8.6 MG/DL (ref 8.7–10.5)
CALCIUM SERPL-MCNC: 8.7 MG/DL (ref 8.7–10.5)
CALCIUM SERPL-MCNC: 8.8 MG/DL (ref 8.7–10.5)
CALCIUM SERPL-MCNC: 8.8 MG/DL (ref 8.7–10.5)
CALCIUM SERPL-MCNC: 8.9 MG/DL (ref 8.7–10.5)
CALCIUM SERPL-MCNC: 9.1 MG/DL (ref 8.7–10.5)
CALCIUM SERPL-MCNC: 9.2 MG/DL (ref 8.7–10.5)
CALCIUM SERPL-MCNC: 9.3 MG/DL
CALCIUM SERPL-MCNC: 9.4 MG/DL (ref 8.7–10.5)
CALCIUM SERPL-MCNC: 9.5 MG/DL
CALCIUM SERPL-MCNC: 9.6 MG/DL
CALCIUM SERPL-MCNC: 9.6 MG/DL
CALCIUM SERPL-MCNC: 9.8 MG/DL (ref 8.7–10.5)
CHLORIDE SERPL-SCNC: 100 MMOL/L
CHLORIDE SERPL-SCNC: 100 MMOL/L (ref 95–110)
CHLORIDE SERPL-SCNC: 100 MMOL/L (ref 95–110)
CHLORIDE SERPL-SCNC: 101 MMOL/L (ref 95–110)
CHLORIDE SERPL-SCNC: 102 MMOL/L
CHLORIDE SERPL-SCNC: 102 MMOL/L (ref 95–110)
CHLORIDE SERPL-SCNC: 102 MMOL/L (ref 95–110)
CHLORIDE SERPL-SCNC: 103 MMOL/L
CHLORIDE SERPL-SCNC: 103 MMOL/L
CHLORIDE SERPL-SCNC: 103 MMOL/L (ref 95–110)
CHLORIDE SERPL-SCNC: 104 MMOL/L (ref 95–110)
CHLORIDE SERPL-SCNC: 105 MMOL/L (ref 95–110)
CHLORIDE SERPL-SCNC: 105 MMOL/L (ref 95–110)
CHLORIDE SERPL-SCNC: 106 MMOL/L (ref 95–110)
CHLORIDE SERPL-SCNC: 99 MMOL/L
CHLORIDE SERPL-SCNC: 99 MMOL/L (ref 95–110)
CHLORIDE SERPL-SCNC: 99 MMOL/L (ref 95–110)
CO2 SERPL-SCNC: 19 MMOL/L (ref 23–29)
CO2 SERPL-SCNC: 22 MMOL/L
CO2 SERPL-SCNC: 23 MMOL/L (ref 23–29)
CO2 SERPL-SCNC: 24 MMOL/L (ref 23–29)
CO2 SERPL-SCNC: 25 MMOL/L (ref 23–29)
CO2 SERPL-SCNC: 25 MMOL/L (ref 23–29)
CO2 SERPL-SCNC: 26 MMOL/L (ref 23–29)
CO2 SERPL-SCNC: 27 MMOL/L
CO2 SERPL-SCNC: 27 MMOL/L (ref 23–29)
CO2 SERPL-SCNC: 28 MMOL/L
CO2 SERPL-SCNC: 28 MMOL/L (ref 23–29)
CO2 SERPL-SCNC: 30 MMOL/L
CO2 SERPL-SCNC: 31 MMOL/L
CREAT SERPL-MCNC: 0.7 MG/DL (ref 0.5–1.4)
CREAT SERPL-MCNC: 0.7 MG/DL (ref 0.5–1.4)
CREAT SERPL-MCNC: 0.8 MG/DL
CREAT SERPL-MCNC: 0.8 MG/DL
CREAT SERPL-MCNC: 0.8 MG/DL (ref 0.5–1.4)
CREAT SERPL-MCNC: 0.9 MG/DL
CREAT SERPL-MCNC: 0.9 MG/DL (ref 0.5–1.4)
CREAT SERPL-MCNC: 1 MG/DL
CREAT SERPL-MCNC: 1 MG/DL (ref 0.5–1.4)
CREAT SERPL-MCNC: 1.1 MG/DL (ref 0.5–1.4)
CREAT SERPL-MCNC: 1.1 MG/DL (ref 0.5–1.4)
CV ECHO LV RWT: 0.32 CM
CV ECHO LV RWT: 0.59 CM
CV ECHO LV RWT: 0.6 CM
D DIMER PPP IA.FEU-MCNC: 1.5 MG/L FEU
DACRYOCYTES BLD QL SMEAR: ABNORMAL
DELSYS: ABNORMAL
DIFFERENTIAL METHOD: ABNORMAL
DOP CALC AO PEAK VEL: 1.03 M/S
DOP CALC AO PEAK VEL: 1.29 M/S
DOP CALC AO PEAK VEL: 1.36 M/S
DOP CALC AO VTI: 19.91 CM
DOP CALC AO VTI: 25.31 CM
DOP CALC AO VTI: 27.99 CM
DOP CALC LVOT AREA: 3.1 CM2
DOP CALC LVOT AREA: 3.33 CM2
DOP CALC LVOT AREA: 3.83 CM2
DOP CALC LVOT DIAMETER: 2 CM
DOP CALC LVOT DIAMETER: 2.06 CM
DOP CALC LVOT DIAMETER: 2.21 CM
DOP CALC LVOT PEAK VEL: 0.41 M/S
DOP CALC LVOT PEAK VEL: 0.78 M/S
DOP CALC LVOT PEAK VEL: 0.88 M/S
DOP CALC LVOT STROKE VOLUME: 28.64 CM3
DOP CALC LVOT STROKE VOLUME: 55.32 CM3
DOP CALC LVOT STROKE VOLUME: 57.9 CM3
DOP CALCLVOT PEAK VEL VTI: 14.43 CM
DOP CALCLVOT PEAK VEL VTI: 17.38 CM
DOP CALCLVOT PEAK VEL VTI: 9.12 CM
E WAVE DECELERATION TIME: 102.8 MSEC
E WAVE DECELERATION TIME: 133.65 MSEC
E WAVE DECELERATION TIME: 202.48 MSEC
E/A RATIO: 0.93
E/A RATIO: 1.11
E/A RATIO: 1.92
E/E' RATIO: 19.8
E/E' RATIO: 21 M/S
ECHO LV POSTERIOR WALL: 0.96 CM (ref 0.6–1.1)
ECHO LV POSTERIOR WALL: 1.47 CM (ref 0.6–1.1)
ECHO LV POSTERIOR WALL: 1.52 CM (ref 0.6–1.1)
EOSINOPHIL # BLD AUTO: 0 K/UL
EOSINOPHIL # BLD AUTO: 0 K/UL (ref 0–0.5)
EOSINOPHIL # BLD AUTO: 0.1 K/UL (ref 0–0.5)
EOSINOPHIL # BLD AUTO: 0.1 K/UL (ref 0–0.5)
EOSINOPHIL # BLD AUTO: 0.2 K/UL (ref 0–0.5)
EOSINOPHIL # BLD AUTO: 0.3 K/UL (ref 0–0.5)
EOSINOPHIL # BLD AUTO: 0.4 K/UL (ref 0–0.5)
EOSINOPHIL # BLD AUTO: 0.9 K/UL
EOSINOPHIL NFR BLD: 0 %
EOSINOPHIL NFR BLD: 0 % (ref 0–8)
EOSINOPHIL NFR BLD: 0.2 % (ref 0–8)
EOSINOPHIL NFR BLD: 0.2 % (ref 0–8)
EOSINOPHIL NFR BLD: 0.7 % (ref 0–8)
EOSINOPHIL NFR BLD: 1.1 % (ref 0–8)
EOSINOPHIL NFR BLD: 2.2 % (ref 0–8)
EOSINOPHIL NFR BLD: 2.4 % (ref 0–8)
EOSINOPHIL NFR BLD: 3.9 %
EOSINOPHIL NFR BLD: 5 %
EOSINOPHIL NFR BLD: 5 % (ref 0–8)
EP: 5
ERYTHROCYTE [DISTWIDTH] IN BLOOD BY AUTOMATED COUNT: 19.5 % (ref 11.5–14.5)
ERYTHROCYTE [DISTWIDTH] IN BLOOD BY AUTOMATED COUNT: 19.9 % (ref 11.5–14.5)
ERYTHROCYTE [DISTWIDTH] IN BLOOD BY AUTOMATED COUNT: 20 % (ref 11.5–14.5)
ERYTHROCYTE [DISTWIDTH] IN BLOOD BY AUTOMATED COUNT: 20 % (ref 11.5–14.5)
ERYTHROCYTE [DISTWIDTH] IN BLOOD BY AUTOMATED COUNT: 20.9 % (ref 11.5–14.5)
ERYTHROCYTE [DISTWIDTH] IN BLOOD BY AUTOMATED COUNT: 21 % (ref 11.5–14.5)
ERYTHROCYTE [DISTWIDTH] IN BLOOD BY AUTOMATED COUNT: 21.1 % (ref 11.5–14.5)
ERYTHROCYTE [DISTWIDTH] IN BLOOD BY AUTOMATED COUNT: 21.1 % (ref 11.5–14.5)
ERYTHROCYTE [DISTWIDTH] IN BLOOD BY AUTOMATED COUNT: 21.2 % (ref 11.5–14.5)
ERYTHROCYTE [DISTWIDTH] IN BLOOD BY AUTOMATED COUNT: 21.2 % (ref 11.5–14.5)
ERYTHROCYTE [DISTWIDTH] IN BLOOD BY AUTOMATED COUNT: 21.3 % (ref 11.5–14.5)
ERYTHROCYTE [DISTWIDTH] IN BLOOD BY AUTOMATED COUNT: 21.4 % (ref 11.5–14.5)
ERYTHROCYTE [DISTWIDTH] IN BLOOD BY AUTOMATED COUNT: 21.9 %
ERYTHROCYTE [DISTWIDTH] IN BLOOD BY AUTOMATED COUNT: 22 %
ERYTHROCYTE [DISTWIDTH] IN BLOOD BY AUTOMATED COUNT: 22.3 %
ERYTHROCYTE [DISTWIDTH] IN BLOOD BY AUTOMATED COUNT: 22.4 %
ERYTHROCYTE [DISTWIDTH] IN BLOOD BY AUTOMATED COUNT: 22.4 %
ERYTHROCYTE [DISTWIDTH] IN BLOOD BY AUTOMATED COUNT: 22.6 %
ERYTHROCYTE [DISTWIDTH] IN BLOOD BY AUTOMATED COUNT: 22.6 %
ERYTHROCYTE [SEDIMENTATION RATE] IN BLOOD BY WESTERGREN METHOD: 14 MM/H
ERYTHROCYTE [SEDIMENTATION RATE] IN BLOOD BY WESTERGREN METHOD: 16 MM/H
EST. GFR  (AFRICAN AMERICAN): 56 ML/MIN/1.73 M^2
EST. GFR  (AFRICAN AMERICAN): 56 ML/MIN/1.73 M^2
EST. GFR  (AFRICAN AMERICAN): >60 ML/MIN/1.73 M^2
EST. GFR  (NON AFRICAN AMERICAN): 49 ML/MIN/1.73 M^2
EST. GFR  (NON AFRICAN AMERICAN): 49 ML/MIN/1.73 M^2
EST. GFR  (NON AFRICAN AMERICAN): 55 ML/MIN/1.73 M^2
EST. GFR  (NON AFRICAN AMERICAN): >60 ML/MIN/1.73 M^2
ESTIMATED AVG GLUCOSE: 169 MG/DL (ref 68–131)
ESTIMATED AVG GLUCOSE: 183 MG/DL
ESTIMATED AVG GLUCOSE: 189 MG/DL (ref 68–131)
FIO2: 25
FIO2: 30
FIO2: 40
FIO2: 40
FIO2: 50
FIO2: 60
FLOW: 3
FLOW: 4
FRACTIONAL SHORTENING: 18 % (ref 28–44)
FRACTIONAL SHORTENING: 20 % (ref 28–44)
FRACTIONAL SHORTENING: 4 % (ref 28–44)
GIANT PLATELETS BLD QL SMEAR: PRESENT
GLUCOSE SERPL-MCNC: 103 MG/DL (ref 70–110)
GLUCOSE SERPL-MCNC: 154 MG/DL
GLUCOSE SERPL-MCNC: 161 MG/DL (ref 70–110)
GLUCOSE SERPL-MCNC: 170 MG/DL (ref 70–110)
GLUCOSE SERPL-MCNC: 170 MG/DL (ref 70–110)
GLUCOSE SERPL-MCNC: 199 MG/DL
GLUCOSE SERPL-MCNC: 217 MG/DL (ref 70–110)
GLUCOSE SERPL-MCNC: 219 MG/DL (ref 70–110)
GLUCOSE SERPL-MCNC: 225 MG/DL (ref 70–110)
GLUCOSE SERPL-MCNC: 226 MG/DL (ref 70–110)
GLUCOSE SERPL-MCNC: 226 MG/DL (ref 70–110)
GLUCOSE SERPL-MCNC: 244 MG/DL
GLUCOSE SERPL-MCNC: 259 MG/DL (ref 70–110)
GLUCOSE SERPL-MCNC: 290 MG/DL (ref 70–110)
GLUCOSE SERPL-MCNC: 293 MG/DL
GLUCOSE SERPL-MCNC: 306 MG/DL (ref 70–110)
GLUCOSE SERPL-MCNC: 312 MG/DL
GLUCOSE SERPL-MCNC: 327 MG/DL (ref 70–110)
GLUCOSE SERPL-MCNC: 328 MG/DL
GLUCOSE SERPL-MCNC: 63 MG/DL (ref 70–110)
GLUCOSE SERPL-MCNC: 88 MG/DL
GLUCOSE SERPL-MCNC: 96 MG/DL (ref 70–110)
HBA1C MFR BLD HPLC: 7.5 % (ref 4–5.6)
HBA1C MFR BLD HPLC: 8 %
HBA1C MFR BLD HPLC: 8.2 % (ref 4–5.6)
HCO3 UR-SCNC: 19.4 MMOL/L (ref 24–28)
HCO3 UR-SCNC: 21.9 MMOL/L (ref 24–28)
HCO3 UR-SCNC: 22.6 MMOL/L (ref 24–28)
HCO3 UR-SCNC: 24.1 MMOL/L (ref 24–28)
HCO3 UR-SCNC: 24.9 MMOL/L (ref 24–28)
HCO3 UR-SCNC: 25.8 MMOL/L (ref 24–28)
HCO3 UR-SCNC: 26.7 MMOL/L (ref 24–28)
HCO3 UR-SCNC: 31.9 MMOL/L (ref 24–28)
HCT VFR BLD AUTO: 28.5 %
HCT VFR BLD AUTO: 29.3 % (ref 37–48.5)
HCT VFR BLD AUTO: 29.7 % (ref 37–48.5)
HCT VFR BLD AUTO: 30.4 % (ref 37–48.5)
HCT VFR BLD AUTO: 30.7 %
HCT VFR BLD AUTO: 31.4 % (ref 37–48.5)
HCT VFR BLD AUTO: 31.6 % (ref 37–48.5)
HCT VFR BLD AUTO: 31.7 %
HCT VFR BLD AUTO: 31.9 % (ref 37–48.5)
HCT VFR BLD AUTO: 32.3 % (ref 37–48.5)
HCT VFR BLD AUTO: 32.8 % (ref 37–48.5)
HCT VFR BLD AUTO: 33.5 %
HCT VFR BLD AUTO: 33.7 %
HCT VFR BLD AUTO: 33.9 % (ref 37–48.5)
HCT VFR BLD AUTO: 34.1 % (ref 37–48.5)
HCT VFR BLD AUTO: 34.4 %
HCT VFR BLD AUTO: 34.5 % (ref 37–48.5)
HCT VFR BLD AUTO: 34.8 % (ref 37–48.5)
HCT VFR BLD AUTO: 35.7 %
HCT VFR BLD CALC: 33 %PCV (ref 36–54)
HGB BLD-MCNC: 10 G/DL (ref 12–16)
HGB BLD-MCNC: 10.2 G/DL (ref 12–16)
HGB BLD-MCNC: 10.2 G/DL (ref 12–16)
HGB BLD-MCNC: 10.5 G/DL
HGB BLD-MCNC: 10.5 G/DL
HGB BLD-MCNC: 10.5 G/DL (ref 12–16)
HGB BLD-MCNC: 10.5 G/DL (ref 12–16)
HGB BLD-MCNC: 10.6 G/DL
HGB BLD-MCNC: 10.7 G/DL (ref 12–16)
HGB BLD-MCNC: 11 G/DL (ref 12–16)
HGB BLD-MCNC: 11.2 G/DL
HGB BLD-MCNC: 11.5 G/DL (ref 12–16)
HGB BLD-MCNC: 8.9 G/DL
HGB BLD-MCNC: 9.3 G/DL (ref 12–16)
HGB BLD-MCNC: 9.5 G/DL
HGB BLD-MCNC: 9.5 G/DL (ref 12–16)
HGB BLD-MCNC: 9.6 G/DL (ref 12–16)
HGB BLD-MCNC: 9.8 G/DL (ref 12–16)
HGB BLD-MCNC: 9.9 G/DL
HYPOCHROMIA BLD QL SMEAR: ABNORMAL
HYPOCHROMIA BLD QL SMEAR: ABNORMAL
INR PPP: 1.1 (ref 0.8–1.2)
INTERVENTRICULAR SEPTUM: 1.05 CM (ref 0.6–1.1)
INTERVENTRICULAR SEPTUM: 1.34 CM (ref 0.6–1.1)
INTERVENTRICULAR SEPTUM: 1.47 CM (ref 0.6–1.1)
IP: 10
IVRT: 0.04 MSEC
IVRT: 0.1 MSEC
IVRT: 0.13 MSEC
LA MAJOR: 5.35 CM
LA MAJOR: 5.36 CM
LA MAJOR: 5.72 CM
LA MINOR: 4.83 CM
LA MINOR: 4.86 CM
LA MINOR: 5.66 CM
LA WIDTH: 4.74 CM
LA WIDTH: 4.94 CM
LA WIDTH: 4.95 CM
LACTATE SERPL-SCNC: 2 MMOL/L
LACTATE SERPL-SCNC: 3.1 MMOL/L (ref 0.5–2.2)
LACTATE SERPL-SCNC: 4.3 MMOL/L (ref 0.5–2.2)
LACTATE SERPL-SCNC: 5.8 MMOL/L (ref 0.5–2.2)
LEFT ATRIUM SIZE: 3.16 CM
LEFT ATRIUM SIZE: 4.15 CM
LEFT ATRIUM SIZE: 4.5 CM
LEFT ATRIUM VOLUME INDEX: 41 ML/M2
LEFT ATRIUM VOLUME INDEX: 53.7 ML/M2
LEFT ATRIUM VOLUME INDEX: 61.6 ML/M2
LEFT ATRIUM VOLUME: 103.16 CM3
LEFT ATRIUM VOLUME: 67.64 CM3
LEFT ATRIUM VOLUME: 88.65 CM3
LEFT INTERNAL DIMENSION IN SYSTOLE: 4.03 CM (ref 2.1–4)
LEFT INTERNAL DIMENSION IN SYSTOLE: 4.79 CM (ref 2.1–4)
LEFT INTERNAL DIMENSION IN SYSTOLE: 4.94 CM (ref 2.1–4)
LEFT VENTRICLE DIASTOLIC VOLUME INDEX: 108.05 ML/M2
LEFT VENTRICLE DIASTOLIC VOLUME INDEX: 68.95 ML/M2
LEFT VENTRICLE DIASTOLIC VOLUME INDEX: 75.81 ML/M2
LEFT VENTRICLE DIASTOLIC VOLUME: 113.76 ML
LEFT VENTRICLE DIASTOLIC VOLUME: 125.08 ML
LEFT VENTRICLE DIASTOLIC VOLUME: 181.07 ML
LEFT VENTRICLE MASS INDEX: 149.1 G/M2
LEFT VENTRICLE MASS INDEX: 173.2 G/M2
LEFT VENTRICLE MASS INDEX: 202 G/M2
LEFT VENTRICLE SYSTOLIC VOLUME INDEX: 43.1 ML/M2
LEFT VENTRICLE SYSTOLIC VOLUME INDEX: 63.9 ML/M2
LEFT VENTRICLE SYSTOLIC VOLUME INDEX: 69.7 ML/M2
LEFT VENTRICLE SYSTOLIC VOLUME: 107.03 ML
LEFT VENTRICLE SYSTOLIC VOLUME: 115.06 ML
LEFT VENTRICLE SYSTOLIC VOLUME: 71.09 ML
LEFT VENTRICULAR INTERNAL DIMENSION IN DIASTOLE: 4.92 CM (ref 3.5–6)
LEFT VENTRICULAR INTERNAL DIMENSION IN DIASTOLE: 5.12 CM (ref 3.5–6)
LEFT VENTRICULAR INTERNAL DIMENSION IN DIASTOLE: 6.02 CM (ref 3.5–6)
LEFT VENTRICULAR MASS: 249.88 G
LEFT VENTRICULAR MASS: 285.8 G
LEFT VENTRICULAR MASS: 332.73 G
LV LATERAL E/E' RATIO: 16.5
LV LATERAL E/E' RATIO: 21 M/S
LV SEPTAL E/E' RATIO: 21 M/S
LV SEPTAL E/E' RATIO: 24.75
LYMPHOCYTES # BLD AUTO: 0.4 K/UL (ref 1–4.8)
LYMPHOCYTES # BLD AUTO: 0.5 K/UL (ref 1–4.8)
LYMPHOCYTES # BLD AUTO: 0.5 K/UL (ref 1–4.8)
LYMPHOCYTES # BLD AUTO: 0.7 K/UL (ref 1–4.8)
LYMPHOCYTES # BLD AUTO: 0.7 K/UL (ref 1–4.8)
LYMPHOCYTES # BLD AUTO: 0.9 K/UL
LYMPHOCYTES # BLD AUTO: 1 K/UL
LYMPHOCYTES # BLD AUTO: 1.1 K/UL
LYMPHOCYTES # BLD AUTO: 1.3 K/UL
LYMPHOCYTES # BLD AUTO: 1.8 K/UL (ref 1–4.8)
LYMPHOCYTES # BLD AUTO: 2 K/UL (ref 1–4.8)
LYMPHOCYTES # BLD AUTO: 2.1 K/UL (ref 1–4.8)
LYMPHOCYTES # BLD AUTO: 2.2 K/UL (ref 1–4.8)
LYMPHOCYTES # BLD AUTO: 2.9 K/UL (ref 1–4.8)
LYMPHOCYTES # BLD AUTO: 3.9 K/UL (ref 1–4.8)
LYMPHOCYTES # BLD AUTO: 4.1 K/UL
LYMPHOCYTES NFR BLD: 16.5 % (ref 18–48)
LYMPHOCYTES NFR BLD: 16.5 % (ref 18–48)
LYMPHOCYTES NFR BLD: 18 %
LYMPHOCYTES NFR BLD: 18.7 % (ref 18–48)
LYMPHOCYTES NFR BLD: 19 %
LYMPHOCYTES NFR BLD: 24.6 % (ref 18–48)
LYMPHOCYTES NFR BLD: 25.3 % (ref 18–48)
LYMPHOCYTES NFR BLD: 3.9 % (ref 18–48)
LYMPHOCYTES NFR BLD: 31 % (ref 18–48)
LYMPHOCYTES NFR BLD: 4.1 % (ref 18–48)
LYMPHOCYTES NFR BLD: 4.2 %
LYMPHOCYTES NFR BLD: 5 %
LYMPHOCYTES NFR BLD: 5.3 %
LYMPHOCYTES NFR BLD: 5.9 % (ref 18–48)
LYMPHOCYTES NFR BLD: 6.1 %
LYMPHOCYTES NFR BLD: 7.2 % (ref 18–48)
LYMPHOCYTES NFR BLD: 7.5 % (ref 18–48)
LYMPHOCYTES NFR BLD: 9 %
MAGNESIUM SERPL-MCNC: 1.7 MG/DL (ref 1.6–2.6)
MAGNESIUM SERPL-MCNC: 2 MG/DL
MAGNESIUM SERPL-MCNC: 2.4 MG/DL (ref 1.6–2.6)
MCH RBC QN AUTO: 23.7 PG
MCH RBC QN AUTO: 23.7 PG
MCH RBC QN AUTO: 23.9 PG
MCH RBC QN AUTO: 23.9 PG
MCH RBC QN AUTO: 24 PG
MCH RBC QN AUTO: 24.1 PG
MCH RBC QN AUTO: 24.3 PG
MCH RBC QN AUTO: 24.8 PG (ref 27–31)
MCH RBC QN AUTO: 25 PG (ref 27–31)
MCH RBC QN AUTO: 25.1 PG (ref 27–31)
MCH RBC QN AUTO: 25.2 PG (ref 27–31)
MCH RBC QN AUTO: 25.2 PG (ref 27–31)
MCH RBC QN AUTO: 25.3 PG (ref 27–31)
MCH RBC QN AUTO: 25.3 PG (ref 27–31)
MCH RBC QN AUTO: 25.4 PG (ref 27–31)
MCH RBC QN AUTO: 25.5 PG (ref 27–31)
MCH RBC QN AUTO: 25.6 PG (ref 27–31)
MCH RBC QN AUTO: 25.6 PG (ref 27–31)
MCH RBC QN AUTO: 26 PG (ref 27–31)
MCHC RBC AUTO-ENTMCNC: 30.5 G/DL
MCHC RBC AUTO-ENTMCNC: 30.9 G/DL
MCHC RBC AUTO-ENTMCNC: 31 G/DL (ref 32–36)
MCHC RBC AUTO-ENTMCNC: 31.2 G/DL
MCHC RBC AUTO-ENTMCNC: 31.2 G/DL
MCHC RBC AUTO-ENTMCNC: 31.2 G/DL (ref 32–36)
MCHC RBC AUTO-ENTMCNC: 31.3 G/DL
MCHC RBC AUTO-ENTMCNC: 31.3 G/DL (ref 32–36)
MCHC RBC AUTO-ENTMCNC: 31.3 G/DL (ref 32–36)
MCHC RBC AUTO-ENTMCNC: 31.4 G/DL
MCHC RBC AUTO-ENTMCNC: 31.4 G/DL (ref 32–36)
MCHC RBC AUTO-ENTMCNC: 31.5 G/DL
MCHC RBC AUTO-ENTMCNC: 31.6 G/DL (ref 32–36)
MCHC RBC AUTO-ENTMCNC: 31.6 G/DL (ref 32–36)
MCHC RBC AUTO-ENTMCNC: 31.9 G/DL (ref 32–36)
MCHC RBC AUTO-ENTMCNC: 32 G/DL (ref 32–36)
MCHC RBC AUTO-ENTMCNC: 32.3 G/DL (ref 32–36)
MCHC RBC AUTO-ENTMCNC: 32.4 G/DL (ref 32–36)
MCHC RBC AUTO-ENTMCNC: 33 G/DL (ref 32–36)
MCV RBC AUTO: 76 FL
MCV RBC AUTO: 77 FL
MCV RBC AUTO: 77 FL
MCV RBC AUTO: 78 FL
MCV RBC AUTO: 78 FL
MCV RBC AUTO: 79 FL (ref 82–98)
MCV RBC AUTO: 80 FL (ref 82–98)
MCV RBC AUTO: 81 FL (ref 82–98)
MIN VOL: 12.6
MIN VOL: 7.3
MIN VOL: 8
MIN VOL: 9.9
MODE: ABNORMAL
MONOCYTES # BLD AUTO: 0.1 K/UL (ref 0.3–1)
MONOCYTES # BLD AUTO: 0.1 K/UL (ref 0.3–1)
MONOCYTES # BLD AUTO: 0.2 K/UL (ref 0.3–1)
MONOCYTES # BLD AUTO: 0.3 K/UL
MONOCYTES # BLD AUTO: 0.3 K/UL (ref 0.3–1)
MONOCYTES # BLD AUTO: 0.4 K/UL
MONOCYTES # BLD AUTO: 0.5 K/UL (ref 0.3–1)
MONOCYTES # BLD AUTO: 0.6 K/UL (ref 0.3–1)
MONOCYTES # BLD AUTO: 0.7 K/UL
MONOCYTES # BLD AUTO: 0.7 K/UL (ref 0.3–1)
MONOCYTES # BLD AUTO: 1 K/UL (ref 0.3–1)
MONOCYTES # BLD AUTO: 1.2 K/UL
MONOCYTES # BLD AUTO: 1.3 K/UL
MONOCYTES # BLD AUTO: 1.3 K/UL (ref 0.3–1)
MONOCYTES # BLD AUTO: 1.3 K/UL (ref 0.3–1)
MONOCYTES # BLD AUTO: 1.5 K/UL (ref 0.3–1)
MONOCYTES NFR BLD: 0.9 % (ref 4–15)
MONOCYTES NFR BLD: 1.2 % (ref 4–15)
MONOCYTES NFR BLD: 1.5 %
MONOCYTES NFR BLD: 1.9 %
MONOCYTES NFR BLD: 11.1 % (ref 4–15)
MONOCYTES NFR BLD: 11.8 % (ref 4–15)
MONOCYTES NFR BLD: 12 %
MONOCYTES NFR BLD: 2 %
MONOCYTES NFR BLD: 2.1 % (ref 4–15)
MONOCYTES NFR BLD: 3.3 % (ref 4–15)
MONOCYTES NFR BLD: 3.8 %
MONOCYTES NFR BLD: 4 %
MONOCYTES NFR BLD: 4 % (ref 4–15)
MONOCYTES NFR BLD: 5.4 % (ref 4–15)
MONOCYTES NFR BLD: 5.9 %
MONOCYTES NFR BLD: 6.9 % (ref 4–15)
MONOCYTES NFR BLD: 8.1 % (ref 4–15)
MONOCYTES NFR BLD: 8.6 % (ref 4–15)
MV PEAK A VEL: 0.73 M/S
MV PEAK A VEL: 0.76 M/S
MV PEAK A VEL: 1.06 M/S
MV PEAK E VEL: 0.84 M/S
MV PEAK E VEL: 0.99 M/S
MV PEAK E VEL: 1.4 M/S
NEUTROPHILS # BLD AUTO: 11.3 K/UL (ref 1.8–7.7)
NEUTROPHILS # BLD AUTO: 14.8 K/UL
NEUTROPHILS # BLD AUTO: 16.1 K/UL
NEUTROPHILS # BLD AUTO: 16.3 K/UL (ref 1.8–7.7)
NEUTROPHILS # BLD AUTO: 16.8 K/UL
NEUTROPHILS # BLD AUTO: 16.9 K/UL
NEUTROPHILS # BLD AUTO: 28.1 K/UL
NEUTROPHILS # BLD AUTO: 4.9 K/UL (ref 1.8–7.7)
NEUTROPHILS # BLD AUTO: 5.6 K/UL (ref 1.8–7.7)
NEUTROPHILS # BLD AUTO: 6.1 K/UL (ref 1.8–7.7)
NEUTROPHILS # BLD AUTO: 7.4 K/UL (ref 1.8–7.7)
NEUTROPHILS # BLD AUTO: 7.5 K/UL (ref 1.8–7.7)
NEUTROPHILS # BLD AUTO: 7.9 K/UL (ref 1.8–7.7)
NEUTROPHILS # BLD AUTO: 8.1 K/UL (ref 1.8–7.7)
NEUTROPHILS # BLD AUTO: 8.8 K/UL (ref 1.8–7.7)
NEUTROPHILS # BLD AUTO: 9.6 K/UL (ref 1.8–7.7)
NEUTROPHILS NFR BLD: 58.7 % (ref 38–73)
NEUTROPHILS NFR BLD: 61.6 % (ref 38–73)
NEUTROPHILS NFR BLD: 66.2 % (ref 38–73)
NEUTROPHILS NFR BLD: 70 %
NEUTROPHILS NFR BLD: 70.2 %
NEUTROPHILS NFR BLD: 70.7 % (ref 38–73)
NEUTROPHILS NFR BLD: 72.4 % (ref 38–73)
NEUTROPHILS NFR BLD: 72.7 % (ref 38–73)
NEUTROPHILS NFR BLD: 79 %
NEUTROPHILS NFR BLD: 89 % (ref 38–73)
NEUTROPHILS NFR BLD: 90.6 %
NEUTROPHILS NFR BLD: 91.8 % (ref 38–73)
NEUTROPHILS NFR BLD: 92.2 % (ref 38–73)
NEUTROPHILS NFR BLD: 92.3 %
NEUTROPHILS NFR BLD: 92.9 % (ref 38–73)
NEUTROPHILS NFR BLD: 93 %
NEUTROPHILS NFR BLD: 93.6 %
NEUTROPHILS NFR BLD: 94 % (ref 38–73)
OVALOCYTES BLD QL SMEAR: ABNORMAL
PCO2 BLDA: 33.6 MMHG (ref 35–45)
PCO2 BLDA: 35.8 MMHG (ref 35–45)
PCO2 BLDA: 41.2 MMHG (ref 35–45)
PCO2 BLDA: 43.7 MMHG (ref 35–45)
PCO2 BLDA: 46.3 MMHG (ref 35–45)
PCO2 BLDA: 49.4 MMHG (ref 35–45)
PCO2 BLDA: 61.2 MMHG (ref 35–45)
PCO2 BLDA: 85.5 MMHG (ref 35–45)
PEEP: 5
PH SMN: 7.1 [PH] (ref 7.35–7.45)
PH SMN: 7.25 [PH] (ref 7.35–7.45)
PH SMN: 7.3 [PH] (ref 7.35–7.45)
PH SMN: 7.31 [PH] (ref 7.35–7.45)
PH SMN: 7.33 [PH] (ref 7.35–7.45)
PH SMN: 7.41 [PH] (ref 7.35–7.45)
PH SMN: 7.42 [PH] (ref 7.35–7.45)
PH SMN: 7.44 [PH] (ref 7.35–7.45)
PIP: 19
PISA TR MAX VEL: 2.38 M/S
PISA TR MAX VEL: 2.85 M/S
PISA TR MAX VEL: 2.86 M/S
PLATELET # BLD AUTO: 180 K/UL (ref 150–350)
PLATELET # BLD AUTO: 190 K/UL (ref 150–350)
PLATELET # BLD AUTO: 208 K/UL (ref 150–350)
PLATELET # BLD AUTO: 208 K/UL (ref 150–350)
PLATELET # BLD AUTO: 212 K/UL (ref 150–350)
PLATELET # BLD AUTO: 216 K/UL (ref 150–350)
PLATELET # BLD AUTO: 245 K/UL (ref 150–350)
PLATELET # BLD AUTO: 262 K/UL (ref 150–350)
PLATELET # BLD AUTO: 263 K/UL (ref 150–350)
PLATELET # BLD AUTO: 268 K/UL (ref 150–350)
PLATELET # BLD AUTO: 292 K/UL (ref 150–350)
PLATELET # BLD AUTO: 316 K/UL (ref 150–350)
PLATELET # BLD AUTO: 506 K/UL
PLATELET # BLD AUTO: 516 K/UL
PLATELET # BLD AUTO: 569 K/UL
PLATELET # BLD AUTO: 642 K/UL
PLATELET # BLD AUTO: 647 K/UL
PLATELET # BLD AUTO: 662 K/UL
PLATELET # BLD AUTO: 667 K/UL
PLATELET BLD QL SMEAR: ABNORMAL
PMV BLD AUTO: 10.1 FL (ref 9.2–12.9)
PMV BLD AUTO: 10.2 FL
PMV BLD AUTO: 10.3 FL (ref 9.2–12.9)
PMV BLD AUTO: 10.4 FL (ref 9.2–12.9)
PMV BLD AUTO: 10.5 FL
PMV BLD AUTO: 10.6 FL (ref 9.2–12.9)
PMV BLD AUTO: 10.6 FL (ref 9.2–12.9)
PMV BLD AUTO: 10.7 FL (ref 9.2–12.9)
PMV BLD AUTO: 10.8 FL
PMV BLD AUTO: 10.9 FL
PMV BLD AUTO: 10.9 FL
PMV BLD AUTO: 10.9 FL (ref 9.2–12.9)
PMV BLD AUTO: 11.3 FL (ref 9.2–12.9)
PMV BLD AUTO: 11.7 FL
PMV BLD AUTO: ABNORMAL FL
PO2 BLDA: 114 MMHG (ref 80–100)
PO2 BLDA: 131 MMHG (ref 80–100)
PO2 BLDA: 138 MMHG (ref 80–100)
PO2 BLDA: 27 MMHG (ref 40–60)
PO2 BLDA: 52 MMHG (ref 80–100)
PO2 BLDA: 69 MMHG (ref 80–100)
PO2 BLDA: 77 MMHG (ref 80–100)
PO2 BLDA: 92 MMHG (ref 80–100)
POC ACTIVATED CLOTTING TIME K: 169 SEC (ref 74–137)
POC BE: -2 MMOL/L
POC BE: -2 MMOL/L
POC BE: -4 MMOL/L
POC BE: -5 MMOL/L
POC BE: -8 MMOL/L
POC BE: 0 MMOL/L
POC BE: 1 MMOL/L
POC BE: 4 MMOL/L
POC IONIZED CALCIUM: 1.12 MMOL/L (ref 1.06–1.42)
POC SATURATED O2: 45 % (ref 95–100)
POC SATURATED O2: 82 % (ref 95–100)
POC SATURATED O2: 88 % (ref 95–100)
POC SATURATED O2: 94 % (ref 95–100)
POC SATURATED O2: 96 % (ref 95–100)
POC SATURATED O2: 99 % (ref 95–100)
POC TCO2 (MEASURED): 25 MMOL/L (ref 23–27)
POC TCO2: 21 MMOL/L (ref 23–27)
POC TCO2: 23 MMOL/L (ref 23–27)
POC TCO2: 24 MMOL/L (ref 23–27)
POC TCO2: 25 MMOL/L (ref 23–27)
POC TCO2: 26 MMOL/L (ref 23–27)
POC TCO2: 27 MMOL/L (ref 23–27)
POC TCO2: 29 MMOL/L (ref 23–27)
POC TCO2: 34 MMOL/L (ref 24–29)
POCT GLUCOSE: 133 MG/DL (ref 70–110)
POCT GLUCOSE: 137 MG/DL (ref 70–110)
POCT GLUCOSE: 144 MG/DL (ref 70–110)
POCT GLUCOSE: 144 MG/DL (ref 70–110)
POCT GLUCOSE: 173 MG/DL (ref 70–110)
POCT GLUCOSE: 175 MG/DL (ref 70–110)
POCT GLUCOSE: 181 MG/DL (ref 70–110)
POCT GLUCOSE: 192 MG/DL (ref 70–110)
POCT GLUCOSE: 198 MG/DL (ref 70–110)
POCT GLUCOSE: 207 MG/DL (ref 70–110)
POCT GLUCOSE: 214 MG/DL (ref 70–110)
POCT GLUCOSE: 217 MG/DL (ref 70–110)
POCT GLUCOSE: 220 MG/DL (ref 70–110)
POCT GLUCOSE: 223 MG/DL (ref 70–110)
POCT GLUCOSE: 225 MG/DL (ref 70–110)
POCT GLUCOSE: 231 MG/DL (ref 70–110)
POCT GLUCOSE: 276 MG/DL (ref 70–110)
POCT GLUCOSE: 283 MG/DL (ref 70–110)
POCT GLUCOSE: 284 MG/DL (ref 70–110)
POCT GLUCOSE: 302 MG/DL (ref 70–110)
POCT GLUCOSE: 312 MG/DL (ref 70–110)
POCT GLUCOSE: 326 MG/DL (ref 70–110)
POCT GLUCOSE: 335 MG/DL (ref 70–110)
POCT GLUCOSE: 337 MG/DL (ref 70–110)
POCT GLUCOSE: 342 MG/DL (ref 70–110)
POCT GLUCOSE: 351 MG/DL (ref 70–110)
POCT GLUCOSE: 361 MG/DL (ref 70–110)
POCT GLUCOSE: 387 MG/DL (ref 70–110)
POCT GLUCOSE: 392 MG/DL (ref 70–110)
POCT GLUCOSE: 53 MG/DL (ref 70–110)
POCT GLUCOSE: 74 MG/DL (ref 70–110)
POCT GLUCOSE: 82 MG/DL (ref 70–110)
POCT GLUCOSE: 88 MG/DL (ref 70–110)
POCT GLUCOSE: 92 MG/DL (ref 70–110)
POIKILOCYTOSIS BLD QL SMEAR: SLIGHT
POIKILOCYTOSIS BLD QL SMEAR: SLIGHT
POLYCHROMASIA BLD QL SMEAR: ABNORMAL
POLYCHROMASIA BLD QL SMEAR: ABNORMAL
POTASSIUM BLD-SCNC: 4 MMOL/L (ref 3.5–5.1)
POTASSIUM SERPL-SCNC: 3.4 MMOL/L (ref 3.5–5.1)
POTASSIUM SERPL-SCNC: 3.6 MMOL/L
POTASSIUM SERPL-SCNC: 3.7 MMOL/L
POTASSIUM SERPL-SCNC: 3.8 MMOL/L (ref 3.5–5.1)
POTASSIUM SERPL-SCNC: 3.9 MMOL/L
POTASSIUM SERPL-SCNC: 3.9 MMOL/L
POTASSIUM SERPL-SCNC: 3.9 MMOL/L (ref 3.5–5.1)
POTASSIUM SERPL-SCNC: 4 MMOL/L (ref 3.5–5.1)
POTASSIUM SERPL-SCNC: 4.1 MMOL/L (ref 3.5–5.1)
POTASSIUM SERPL-SCNC: 4.2 MMOL/L (ref 3.5–5.1)
POTASSIUM SERPL-SCNC: 4.5 MMOL/L (ref 3.5–5.1)
POTASSIUM SERPL-SCNC: 4.5 MMOL/L (ref 3.5–5.1)
POTASSIUM SERPL-SCNC: 4.8 MMOL/L
POTASSIUM SERPL-SCNC: 4.9 MMOL/L (ref 3.5–5.1)
PROT SERPL-MCNC: 6 G/DL (ref 6–8.4)
PROT SERPL-MCNC: 6 G/DL (ref 6–8.4)
PROT SERPL-MCNC: 6.1 G/DL (ref 6–8.4)
PROT SERPL-MCNC: 6.5 G/DL (ref 6–8.4)
PROT SERPL-MCNC: 6.8 G/DL
PROT SERPL-MCNC: 6.9 G/DL (ref 6–8.4)
PROT SERPL-MCNC: 7 G/DL
PROT SERPL-MCNC: 7 G/DL (ref 6–8.4)
PROT SERPL-MCNC: 7 G/DL (ref 6–8.4)
PROT SERPL-MCNC: 7.5 G/DL (ref 6–8.4)
PROT SERPL-MCNC: 7.6 G/DL
PROT SERPL-MCNC: 7.7 G/DL (ref 6–8.4)
PROTHROMBIN TIME: 11.3 SEC (ref 9–12.5)
PS: 5
PULM VEIN S/D RATIO: 1.4
PULM VEIN S/D RATIO: 1.54
PV PEAK D VEL: 0.24 M/S
PV PEAK D VEL: 0.5 M/S
PV PEAK S VEL: 0.37 M/S
PV PEAK S VEL: 0.7 M/S
PV PEAK VELOCITY: 0.47 CM/S
PV PEAK VELOCITY: 0.61 CM/S
PV PEAK VELOCITY: 0.8 CM/S
RA MAJOR: 4.66 CM
RA MAJOR: 5.12 CM
RA MAJOR: 5.3 CM
RA PRESSURE: 15 MMHG
RA PRESSURE: 3 MMHG
RA WIDTH: 3.59 CM
RA WIDTH: 3.61 CM
RA WIDTH: 3.63 CM
RBC # BLD AUTO: 3.68 M/UL (ref 4–5.4)
RBC # BLD AUTO: 3.7 M/UL
RBC # BLD AUTO: 3.73 M/UL (ref 4–5.4)
RBC # BLD AUTO: 3.87 M/UL (ref 4–5.4)
RBC # BLD AUTO: 3.89 M/UL (ref 4–5.4)
RBC # BLD AUTO: 3.98 M/UL (ref 4–5.4)
RBC # BLD AUTO: 3.99 M/UL (ref 4–5.4)
RBC # BLD AUTO: 4.01 M/UL
RBC # BLD AUTO: 4.01 M/UL (ref 4–5.4)
RBC # BLD AUTO: 4.08 M/UL
RBC # BLD AUTO: 4.1 M/UL (ref 4–5.4)
RBC # BLD AUTO: 4.2 M/UL (ref 4–5.4)
RBC # BLD AUTO: 4.25 M/UL (ref 4–5.4)
RBC # BLD AUTO: 4.34 M/UL (ref 4–5.4)
RBC # BLD AUTO: 4.39 M/UL
RBC # BLD AUTO: 4.41 M/UL
RBC # BLD AUTO: 4.43 M/UL
RBC # BLD AUTO: 4.43 M/UL (ref 4–5.4)
RBC # BLD AUTO: 4.68 M/UL
RIGHT VENTRICULAR END-DIASTOLIC DIMENSION: 3.03 CM
RIGHT VENTRICULAR END-DIASTOLIC DIMENSION: 3.36 CM
RIGHT VENTRICULAR END-DIASTOLIC DIMENSION: 3.43 CM
RV TISSUE DOPPLER FREE WALL SYSTOLIC VELOCITY 1 (APICAL 4 CHAMBER VIEW): 13.39 CM/S
RV TISSUE DOPPLER FREE WALL SYSTOLIC VELOCITY 1 (APICAL 4 CHAMBER VIEW): 14.05 M/S
RV TISSUE DOPPLER FREE WALL SYSTOLIC VELOCITY 1 (APICAL 4 CHAMBER VIEW): 9.31 M/S
SAMPLE: ABNORMAL
SCHISTOCYTES BLD QL SMEAR: ABNORMAL
SCHISTOCYTES BLD QL SMEAR: ABNORMAL
SCHISTOCYTES BLD QL SMEAR: PRESENT
SINUS: 2.73 CM
SINUS: 2.75 CM
SINUS: 2.88 CM
SITE: ABNORMAL
SODIUM BLD-SCNC: 136 MMOL/L (ref 136–145)
SODIUM SERPL-SCNC: 135 MMOL/L (ref 136–145)
SODIUM SERPL-SCNC: 137 MMOL/L
SODIUM SERPL-SCNC: 137 MMOL/L (ref 136–145)
SODIUM SERPL-SCNC: 137 MMOL/L (ref 136–145)
SODIUM SERPL-SCNC: 138 MMOL/L
SODIUM SERPL-SCNC: 138 MMOL/L (ref 136–145)
SODIUM SERPL-SCNC: 139 MMOL/L
SODIUM SERPL-SCNC: 139 MMOL/L (ref 136–145)
SODIUM SERPL-SCNC: 140 MMOL/L
SODIUM SERPL-SCNC: 140 MMOL/L
SODIUM SERPL-SCNC: 140 MMOL/L (ref 136–145)
SODIUM SERPL-SCNC: 140 MMOL/L (ref 136–145)
SODIUM SERPL-SCNC: 141 MMOL/L (ref 136–145)
SODIUM SERPL-SCNC: 141 MMOL/L (ref 136–145)
SP02: 100
SP02: 91
SP02: 96
SP02: 97
SPONT RATE: 20
SPONT RATE: 5
STJ: 2.25 CM
STJ: 2.37 CM
STJ: 2.4 CM
T3FREE SERPL-MCNC: 1.9 PG/ML (ref 2.3–4.2)
T4 FREE SERPL-MCNC: 1.22 NG/DL (ref 0.71–1.51)
T4 FREE SERPL-MCNC: 1.56 NG/DL (ref 0.71–1.51)
TARGETS BLD QL SMEAR: ABNORMAL
TDI LATERAL: 0.04 M/S
TDI LATERAL: 0.06
TDI SEPTAL: 0.04
TDI SEPTAL: 0.04 M/S
TDI: 0.04 M/S
TDI: 0.05
TR MAX PG: 23 MMHG
TR MAX PG: 32.49 MMHG
TR MAX PG: 32.72 MMHG
TRICUSPID ANNULAR PLANE SYSTOLIC EXCURSION: 1.74 CM
TRICUSPID ANNULAR PLANE SYSTOLIC EXCURSION: 1.85 CM
TRICUSPID ANNULAR PLANE SYSTOLIC EXCURSION: 2.32 CM
TROPONIN I SERPL DL<=0.01 NG/ML-MCNC: 0.04 NG/ML
TROPONIN I SERPL DL<=0.01 NG/ML-MCNC: 0.05 NG/ML (ref 0–0.03)
TROPONIN I SERPL DL<=0.01 NG/ML-MCNC: 0.05 NG/ML (ref 0–0.03)
TROPONIN I SERPL DL<=0.01 NG/ML-MCNC: 0.06 NG/ML
TROPONIN I SERPL DL<=0.01 NG/ML-MCNC: 0.15 NG/ML
TROPONIN I SERPL DL<=0.01 NG/ML-MCNC: 0.19 NG/ML
TROPONIN I SERPL DL<=0.01 NG/ML-MCNC: 0.28 NG/ML (ref 0–0.03)
TROPONIN I SERPL DL<=0.01 NG/ML-MCNC: 0.43 NG/ML (ref 0–0.03)
TROPONIN I SERPL DL<=0.01 NG/ML-MCNC: 16.78 NG/ML (ref 0–0.03)
TROPONIN I SERPL DL<=0.01 NG/ML-MCNC: 18.07 NG/ML (ref 0–0.03)
TROPONIN I SERPL DL<=0.01 NG/ML-MCNC: 20.09 NG/ML (ref 0–0.03)
TROPONIN I SERPL DL<=0.01 NG/ML-MCNC: 20.57 NG/ML (ref 0–0.03)
TROPONIN I SERPL DL<=0.01 NG/ML-MCNC: 23.14 NG/ML (ref 0–0.03)
TROPONIN I SERPL DL<=0.01 NG/ML-MCNC: 23.63 NG/ML (ref 0–0.03)
TROPONIN I SERPL DL<=0.01 NG/ML-MCNC: 5.91 NG/ML (ref 0–0.03)
TROPONIN I SERPL DL<=0.01 NG/ML-MCNC: >50 NG/ML (ref 0–0.03)
TSH SERPL DL<=0.005 MIU/L-ACNC: 0.1 UIU/ML (ref 0.4–4)
TSH SERPL DL<=0.005 MIU/L-ACNC: 0.14 UIU/ML (ref 0.4–4)
TV REST PULMONARY ARTERY PRESSURE: 36 MMHG
TV REST PULMONARY ARTERY PRESSURE: 38 MMHG
VT: 450
VT: 450
WBC # BLD AUTO: 11.17 K/UL (ref 3.9–12.7)
WBC # BLD AUTO: 12.66 K/UL (ref 3.9–12.7)
WBC # BLD AUTO: 13.26 K/UL (ref 3.9–12.7)
WBC # BLD AUTO: 15.57 K/UL (ref 3.9–12.7)
WBC # BLD AUTO: 17.33 K/UL
WBC # BLD AUTO: 17.66 K/UL (ref 3.9–12.7)
WBC # BLD AUTO: 18.42 K/UL
WBC # BLD AUTO: 18.56 K/UL
WBC # BLD AUTO: 21.55 K/UL
WBC # BLD AUTO: 22 K/UL
WBC # BLD AUTO: 30.64 K/UL
WBC # BLD AUTO: 32.36 K/UL
WBC # BLD AUTO: 6.64 K/UL (ref 3.9–12.7)
WBC # BLD AUTO: 8.02 K/UL (ref 3.9–12.7)
WBC # BLD AUTO: 8.13 K/UL (ref 3.9–12.7)
WBC # BLD AUTO: 8.41 K/UL (ref 3.9–12.7)
WBC # BLD AUTO: 8.68 K/UL (ref 3.9–12.7)
WBC # BLD AUTO: 9.17 K/UL (ref 3.9–12.7)
WBC # BLD AUTO: 9.35 K/UL (ref 3.9–12.7)

## 2019-01-01 PROCEDURE — 80048 BASIC METABOLIC PNL TOTAL CA: CPT

## 2019-01-01 PROCEDURE — 94640 AIRWAY INHALATION TREATMENT: CPT

## 2019-01-01 PROCEDURE — 20000000 HC ICU ROOM

## 2019-01-01 PROCEDURE — 99285 EMERGENCY DEPT VISIT HI MDM: CPT | Mod: 25

## 2019-01-01 PROCEDURE — 25000003 PHARM REV CODE 250: Performed by: INTERNAL MEDICINE

## 2019-01-01 PROCEDURE — 87040 BLOOD CULTURE FOR BACTERIA: CPT

## 2019-01-01 PROCEDURE — 99291 PR CRITICAL CARE, E/M 30-74 MINUTES: ICD-10-PCS | Mod: ,,, | Performed by: INTERNAL MEDICINE

## 2019-01-01 PROCEDURE — 83605 ASSAY OF LACTIC ACID: CPT

## 2019-01-01 PROCEDURE — 25000242 PHARM REV CODE 250 ALT 637 W/ HCPCS: Performed by: INTERNAL MEDICINE

## 2019-01-01 PROCEDURE — 92978 PR IVUS, CORONARY, 1ST VESSEL: ICD-10-PCS | Mod: 26,LC,, | Performed by: INTERNAL MEDICINE

## 2019-01-01 PROCEDURE — 99223 1ST HOSP IP/OBS HIGH 75: CPT | Mod: ,,, | Performed by: INTERNAL MEDICINE

## 2019-01-01 PROCEDURE — 96376 TX/PRO/DX INJ SAME DRUG ADON: CPT

## 2019-01-01 PROCEDURE — 27000221 HC OXYGEN, UP TO 24 HOURS

## 2019-01-01 PROCEDURE — 80053 COMPREHEN METABOLIC PANEL: CPT

## 2019-01-01 PROCEDURE — S5571 INSULIN DISPOS PEN 3 ML: HCPCS | Performed by: EMERGENCY MEDICINE

## 2019-01-01 PROCEDURE — 25000003 PHARM REV CODE 250: Performed by: EMERGENCY MEDICINE

## 2019-01-01 PROCEDURE — 94660 CPAP INITIATION&MGMT: CPT

## 2019-01-01 PROCEDURE — 93010 EKG 12-LEAD: ICD-10-PCS | Mod: ,,, | Performed by: INTERNAL MEDICINE

## 2019-01-01 PROCEDURE — G0378 HOSPITAL OBSERVATION PER HR: HCPCS

## 2019-01-01 PROCEDURE — 93571 IV DOP VEL&/PRESS C FLO 1ST: CPT | Mod: 26,52,LD, | Performed by: INTERNAL MEDICINE

## 2019-01-01 PROCEDURE — 25000242 PHARM REV CODE 250 ALT 637 W/ HCPCS: Performed by: EMERGENCY MEDICINE

## 2019-01-01 PROCEDURE — 84484 ASSAY OF TROPONIN QUANT: CPT

## 2019-01-01 PROCEDURE — 94761 N-INVAS EAR/PLS OXIMETRY MLT: CPT

## 2019-01-01 PROCEDURE — 25000242 PHARM REV CODE 250 ALT 637 W/ HCPCS: Performed by: PHYSICIAN ASSISTANT

## 2019-01-01 PROCEDURE — 85025 COMPLETE CBC W/AUTO DIFF WBC: CPT

## 2019-01-01 PROCEDURE — 82803 BLOOD GASES ANY COMBINATION: CPT

## 2019-01-01 PROCEDURE — 36415 COLL VENOUS BLD VENIPUNCTURE: CPT

## 2019-01-01 PROCEDURE — 92920 PRQ TRLUML C ANGIOP 1ART&/BR: CPT | Mod: 51,59,LC, | Performed by: INTERNAL MEDICINE

## 2019-01-01 PROCEDURE — C1874 STENT, COATED/COV W/DEL SYS: HCPCS | Performed by: INTERNAL MEDICINE

## 2019-01-01 PROCEDURE — 99999 PR PBB SHADOW E&M-EST. PATIENT-LVL III: CPT | Mod: PBBFAC,,, | Performed by: INTERNAL MEDICINE

## 2019-01-01 PROCEDURE — S0028 INJECTION, FAMOTIDINE, 20 MG: HCPCS | Performed by: EMERGENCY MEDICINE

## 2019-01-01 PROCEDURE — 63600175 PHARM REV CODE 636 W HCPCS: Performed by: PHYSICIAN ASSISTANT

## 2019-01-01 PROCEDURE — 99152 MOD SED SAME PHYS/QHP 5/>YRS: CPT | Mod: ,,, | Performed by: INTERNAL MEDICINE

## 2019-01-01 PROCEDURE — 99232 PR SUBSEQUENT HOSPITAL CARE,LEVL II: ICD-10-PCS | Mod: 25,,, | Performed by: INTERNAL MEDICINE

## 2019-01-01 PROCEDURE — 99999 PR PBB SHADOW E&M-EST. PATIENT-LVL III: ICD-10-PCS | Mod: PBBFAC,,, | Performed by: INTERNAL MEDICINE

## 2019-01-01 PROCEDURE — 63600175 PHARM REV CODE 636 W HCPCS: Performed by: INTERNAL MEDICINE

## 2019-01-01 PROCEDURE — C1769 GUIDE WIRE: HCPCS | Performed by: INTERNAL MEDICINE

## 2019-01-01 PROCEDURE — S0028 INJECTION, FAMOTIDINE, 20 MG: HCPCS | Performed by: INTERNAL MEDICINE

## 2019-01-01 PROCEDURE — 96375 TX/PRO/DX INJ NEW DRUG ADDON: CPT

## 2019-01-01 PROCEDURE — 93010 ELECTROCARDIOGRAM REPORT: CPT | Mod: ,,, | Performed by: INTERNAL MEDICINE

## 2019-01-01 PROCEDURE — 94003 VENT MGMT INPAT SUBQ DAY: CPT

## 2019-01-01 PROCEDURE — 3078F PR MOST RECENT DIASTOLIC BLOOD PRESSURE < 80 MM HG: ICD-10-PCS | Mod: CPTII,S$GLB,, | Performed by: INTERNAL MEDICINE

## 2019-01-01 PROCEDURE — 82962 GLUCOSE BLOOD TEST: CPT

## 2019-01-01 PROCEDURE — 99900035 HC TECH TIME PER 15 MIN (STAT)

## 2019-01-01 PROCEDURE — 93005 ELECTROCARDIOGRAM TRACING: CPT

## 2019-01-01 PROCEDURE — 84443 ASSAY THYROID STIM HORMONE: CPT

## 2019-01-01 PROCEDURE — 85007 BL SMEAR W/DIFF WBC COUNT: CPT

## 2019-01-01 PROCEDURE — 96365 THER/PROPH/DIAG IV INF INIT: CPT

## 2019-01-01 PROCEDURE — 83735 ASSAY OF MAGNESIUM: CPT

## 2019-01-01 PROCEDURE — 83880 ASSAY OF NATRIURETIC PEPTIDE: CPT

## 2019-01-01 PROCEDURE — C1887 CATHETER, GUIDING: HCPCS | Performed by: INTERNAL MEDICINE

## 2019-01-01 PROCEDURE — 99153 MOD SED SAME PHYS/QHP EA: CPT | Performed by: INTERNAL MEDICINE

## 2019-01-01 PROCEDURE — S4991 NICOTINE PATCH NONLEGEND: HCPCS | Performed by: HOSPITALIST

## 2019-01-01 PROCEDURE — 3075F PR MOST RECENT SYSTOLIC BLOOD PRESS GE 130-139MM HG: ICD-10-PCS | Mod: CPTII,S$GLB,, | Performed by: INTERNAL MEDICINE

## 2019-01-01 PROCEDURE — 25000003 PHARM REV CODE 250: Performed by: HOSPITALIST

## 2019-01-01 PROCEDURE — 92920 PR PTCA: ICD-10-PCS | Mod: 51,59,LC, | Performed by: INTERNAL MEDICINE

## 2019-01-01 PROCEDURE — 63600175 PHARM REV CODE 636 W HCPCS: Performed by: EMERGENCY MEDICINE

## 2019-01-01 PROCEDURE — 99213 OFFICE O/P EST LOW 20 MIN: CPT | Mod: S$GLB,,, | Performed by: INTERNAL MEDICINE

## 2019-01-01 PROCEDURE — S5571 INSULIN DISPOS PEN 3 ML: HCPCS | Performed by: HOSPITALIST

## 2019-01-01 PROCEDURE — 92950 HEART/LUNG RESUSCITATION CPR: CPT

## 2019-01-01 PROCEDURE — 83605 ASSAY OF LACTIC ACID: CPT | Mod: 91

## 2019-01-01 PROCEDURE — 27000190 HC CPAP FULL FACE MASK W/VALVE

## 2019-01-01 PROCEDURE — 85610 PROTHROMBIN TIME: CPT

## 2019-01-01 PROCEDURE — 3288F FALL RISK ASSESSMENT DOCD: CPT | Mod: CPTII,S$GLB,, | Performed by: INTERNAL MEDICINE

## 2019-01-01 PROCEDURE — 92978 ENDOLUMINL IVUS OCT C 1ST: CPT | Mod: 26,LC | Performed by: INTERNAL MEDICINE

## 2019-01-01 PROCEDURE — 84481 FREE ASSAY (FT-3): CPT

## 2019-01-01 PROCEDURE — 63600175 PHARM REV CODE 636 W HCPCS: Performed by: HOSPITALIST

## 2019-01-01 PROCEDURE — 93010 EKG 12-LEAD: ICD-10-PCS | Mod: 76,,, | Performed by: INTERNAL MEDICINE

## 2019-01-01 PROCEDURE — 1101F PR PT FALLS ASSESS DOC 0-1 FALLS W/OUT INJ PAST YR: ICD-10-PCS | Mod: CPTII,S$GLB,, | Performed by: INTERNAL MEDICINE

## 2019-01-01 PROCEDURE — 21400001 HC TELEMETRY ROOM

## 2019-01-01 PROCEDURE — 93010 ELECTROCARDIOGRAM REPORT: CPT | Mod: 76,,, | Performed by: INTERNAL MEDICINE

## 2019-01-01 PROCEDURE — 97162 PT EVAL MOD COMPLEX 30 MIN: CPT

## 2019-01-01 PROCEDURE — C1725 CATH, TRANSLUMIN NON-LASER: HCPCS | Performed by: INTERNAL MEDICINE

## 2019-01-01 PROCEDURE — 25000003 PHARM REV CODE 250: Performed by: PHYSICIAN ASSISTANT

## 2019-01-01 PROCEDURE — 92928 PR STENT: ICD-10-PCS | Mod: LD,,, | Performed by: INTERNAL MEDICINE

## 2019-01-01 PROCEDURE — 97535 SELF CARE MNGMENT TRAINING: CPT

## 2019-01-01 PROCEDURE — 99291 CRITICAL CARE FIRST HOUR: CPT | Mod: 25

## 2019-01-01 PROCEDURE — 1101F PT FALLS ASSESS-DOCD LE1/YR: CPT | Mod: CPTII,S$GLB,, | Performed by: INTERNAL MEDICINE

## 2019-01-01 PROCEDURE — 25000242 PHARM REV CODE 250 ALT 637 W/ HCPCS: Performed by: HOSPITALIST

## 2019-01-01 PROCEDURE — 93458 L HRT ARTERY/VENTRICLE ANGIO: CPT | Mod: 26,,, | Performed by: INTERNAL MEDICINE

## 2019-01-01 PROCEDURE — 93571 PR HEART FLOW RESERV MEASURE,INIT VESSL: ICD-10-PCS | Mod: 26,52,LD, | Performed by: INTERNAL MEDICINE

## 2019-01-01 PROCEDURE — 85027 COMPLETE CBC AUTOMATED: CPT

## 2019-01-01 PROCEDURE — 27201423 OPTIME MED/SURG SUP & DEVICES STERILE SUPPLY: Performed by: INTERNAL MEDICINE

## 2019-01-01 PROCEDURE — 63600175 PHARM REV CODE 636 W HCPCS: Performed by: NURSE PRACTITIONER

## 2019-01-01 PROCEDURE — 99233 SBSQ HOSP IP/OBS HIGH 50: CPT | Mod: ,,, | Performed by: INTERNAL MEDICINE

## 2019-01-01 PROCEDURE — 92928 PRQ TCAT PLMT NTRAC ST 1 LES: CPT | Mod: LC | Performed by: INTERNAL MEDICINE

## 2019-01-01 PROCEDURE — 93571 IV DOP VEL&/PRESS C FLO 1ST: CPT | Mod: 26,LD | Performed by: INTERNAL MEDICINE

## 2019-01-01 PROCEDURE — 3078F DIAST BP <80 MM HG: CPT | Mod: CPTII,S$GLB,, | Performed by: INTERNAL MEDICINE

## 2019-01-01 PROCEDURE — 27100171 HC OXYGEN HIGH FLOW UP TO 24 HOURS

## 2019-01-01 PROCEDURE — 93005 ELECTROCARDIOGRAM TRACING: CPT | Mod: 59

## 2019-01-01 PROCEDURE — 96374 THER/PROPH/DIAG INJ IV PUSH: CPT | Mod: 59

## 2019-01-01 PROCEDURE — 85379 FIBRIN DEGRADATION QUANT: CPT

## 2019-01-01 PROCEDURE — 11000001 HC ACUTE MED/SURG PRIVATE ROOM

## 2019-01-01 PROCEDURE — 92978 ENDOLUMINL IVUS OCT C 1ST: CPT | Mod: 26,LC,, | Performed by: INTERNAL MEDICINE

## 2019-01-01 PROCEDURE — 3077F SYST BP >= 140 MM HG: CPT | Mod: CPTII,S$GLB,, | Performed by: INTERNAL MEDICINE

## 2019-01-01 PROCEDURE — 25500020 PHARM REV CODE 255: Performed by: INTERNAL MEDICINE

## 2019-01-01 PROCEDURE — 96374 THER/PROPH/DIAG INJ IV PUSH: CPT

## 2019-01-01 PROCEDURE — 94644 CONT INHLJ TX 1ST HOUR: CPT

## 2019-01-01 PROCEDURE — C1894 INTRO/SHEATH, NON-LASER: HCPCS | Performed by: INTERNAL MEDICINE

## 2019-01-01 PROCEDURE — 94002 VENT MGMT INPAT INIT DAY: CPT

## 2019-01-01 PROCEDURE — 93010 EKG 12-LEAD: ICD-10-PCS | Mod: 59,76,, | Performed by: INTERNAL MEDICINE

## 2019-01-01 PROCEDURE — 99232 SBSQ HOSP IP/OBS MODERATE 35: CPT | Mod: 25,,, | Performed by: INTERNAL MEDICINE

## 2019-01-01 PROCEDURE — 84484 ASSAY OF TROPONIN QUANT: CPT | Mod: 91

## 2019-01-01 PROCEDURE — S5571 INSULIN DISPOS PEN 3 ML: HCPCS | Performed by: INTERNAL MEDICINE

## 2019-01-01 PROCEDURE — 92920 PR PTCA: ICD-10-PCS | Mod: LC,59,, | Performed by: INTERNAL MEDICINE

## 2019-01-01 PROCEDURE — 93458 L HRT ARTERY/VENTRICLE ANGIO: CPT | Mod: 26,59,, | Performed by: INTERNAL MEDICINE

## 2019-01-01 PROCEDURE — 99214 OFFICE O/P EST MOD 30 MIN: CPT | Mod: S$GLB,,, | Performed by: INTERNAL MEDICINE

## 2019-01-01 PROCEDURE — 97110 THERAPEUTIC EXERCISES: CPT

## 2019-01-01 PROCEDURE — 99233 PR SUBSEQUENT HOSPITAL CARE,LEVL III: ICD-10-PCS | Mod: ,,, | Performed by: INTERNAL MEDICINE

## 2019-01-01 PROCEDURE — 96367 TX/PROPH/DG ADDL SEQ IV INF: CPT

## 2019-01-01 PROCEDURE — 85730 THROMBOPLASTIN TIME PARTIAL: CPT

## 2019-01-01 PROCEDURE — 36600 WITHDRAWAL OF ARTERIAL BLOOD: CPT

## 2019-01-01 PROCEDURE — 3288F PR FALLS RISK ASSESSMENT DOCUMENTED: ICD-10-PCS | Mod: CPTII,S$GLB,, | Performed by: INTERNAL MEDICINE

## 2019-01-01 PROCEDURE — 99214 PR OFFICE/OUTPT VISIT, EST, LEVL IV, 30-39 MIN: ICD-10-PCS | Mod: S$GLB,,, | Performed by: INTERNAL MEDICINE

## 2019-01-01 PROCEDURE — 96375 TX/PRO/DX INJ NEW DRUG ADDON: CPT | Performed by: EMERGENCY MEDICINE

## 2019-01-01 PROCEDURE — 12000002 HC ACUTE/MED SURGE SEMI-PRIVATE ROOM

## 2019-01-01 PROCEDURE — 94799 UNLISTED PULMONARY SVC/PX: CPT

## 2019-01-01 PROCEDURE — 97165 OT EVAL LOW COMPLEX 30 MIN: CPT

## 2019-01-01 PROCEDURE — 99233 SBSQ HOSP IP/OBS HIGH 50: CPT | Mod: ,,, | Performed by: PHYSICIAN ASSISTANT

## 2019-01-01 PROCEDURE — 99291 CRITICAL CARE FIRST HOUR: CPT | Mod: ,,, | Performed by: INTERNAL MEDICINE

## 2019-01-01 PROCEDURE — 92978 ENDOLUMINL IVUS OCT C 1ST: CPT | Mod: LC | Performed by: INTERNAL MEDICINE

## 2019-01-01 PROCEDURE — 31500 INSERT EMERGENCY AIRWAY: CPT

## 2019-01-01 PROCEDURE — 1100F PTFALLS ASSESS-DOCD GE2>/YR: CPT | Mod: CPTII,S$GLB,, | Performed by: INTERNAL MEDICINE

## 2019-01-01 PROCEDURE — 99152 PR MOD CONSCIOUS SEDATION, SAME PHYS, 5+ YRS, FIRST 15 MIN: ICD-10-PCS | Mod: ,,, | Performed by: INTERNAL MEDICINE

## 2019-01-01 PROCEDURE — 3077F PR MOST RECENT SYSTOLIC BLOOD PRESSURE >= 140 MM HG: ICD-10-PCS | Mod: CPTII,S$GLB,, | Performed by: INTERNAL MEDICINE

## 2019-01-01 PROCEDURE — 99900026 HC AIRWAY MAINTENANCE (STAT)

## 2019-01-01 PROCEDURE — C9600 PERC DRUG-EL COR STENT SING: HCPCS | Mod: LD | Performed by: INTERNAL MEDICINE

## 2019-01-01 PROCEDURE — 99213 PR OFFICE/OUTPT VISIT, EST, LEVL III, 20-29 MIN: ICD-10-PCS | Mod: S$GLB,,, | Performed by: INTERNAL MEDICINE

## 2019-01-01 PROCEDURE — C1753 CATH, INTRAVAS ULTRASOUND: HCPCS | Performed by: INTERNAL MEDICINE

## 2019-01-01 PROCEDURE — 96374 THER/PROPH/DIAG INJ IV PUSH: CPT | Performed by: EMERGENCY MEDICINE

## 2019-01-01 PROCEDURE — 93458 PR CATH PLACE/CORON ANGIO, IMG SUPER/INTERP,W LEFT HEART VENTRICULOGRAPHY: ICD-10-PCS | Mod: 26,,, | Performed by: INTERNAL MEDICINE

## 2019-01-01 PROCEDURE — 99223 PR INITIAL HOSPITAL CARE,LEVL III: ICD-10-PCS | Mod: ,,, | Performed by: INTERNAL MEDICINE

## 2019-01-01 PROCEDURE — S5571 INSULIN DISPOS PEN 3 ML: HCPCS | Performed by: PHYSICIAN ASSISTANT

## 2019-01-01 PROCEDURE — 92928 PRQ TCAT PLMT NTRAC ST 1 LES: CPT | Mod: LD,,, | Performed by: INTERNAL MEDICINE

## 2019-01-01 PROCEDURE — 93571 IV DOP VEL&/PRESS C FLO 1ST: CPT | Mod: 52,LD | Performed by: INTERNAL MEDICINE

## 2019-01-01 PROCEDURE — 99152 MOD SED SAME PHYS/QHP 5/>YRS: CPT | Performed by: INTERNAL MEDICINE

## 2019-01-01 PROCEDURE — 87040 BLOOD CULTURE FOR BACTERIA: CPT | Mod: 59

## 2019-01-01 PROCEDURE — 1100F PR PT FALLS ASSESS DOC 2+ FALLS/FALL W/INJURY/YR: ICD-10-PCS | Mod: CPTII,S$GLB,, | Performed by: INTERNAL MEDICINE

## 2019-01-01 PROCEDURE — 85347 COAGULATION TIME ACTIVATED: CPT

## 2019-01-01 PROCEDURE — 84439 ASSAY OF FREE THYROXINE: CPT

## 2019-01-01 PROCEDURE — 93458 L HRT ARTERY/VENTRICLE ANGIO: CPT | Mod: 26,59 | Performed by: INTERNAL MEDICINE

## 2019-01-01 PROCEDURE — 3075F SYST BP GE 130 - 139MM HG: CPT | Mod: CPTII,S$GLB,, | Performed by: INTERNAL MEDICINE

## 2019-01-01 PROCEDURE — 83036 HEMOGLOBIN GLYCOSYLATED A1C: CPT

## 2019-01-01 PROCEDURE — 93458 PR CATH PLACE/CORON ANGIO, IMG SUPER/INTERP,W LEFT HEART VENTRICULOGRAPHY: ICD-10-PCS | Mod: 26,59,, | Performed by: INTERNAL MEDICINE

## 2019-01-01 PROCEDURE — 92920 PRQ TRLUML C ANGIOP 1ART&/BR: CPT | Mod: LC,59,, | Performed by: INTERNAL MEDICINE

## 2019-01-01 PROCEDURE — 99233 PR SUBSEQUENT HOSPITAL CARE,LEVL III: ICD-10-PCS | Mod: ,,, | Performed by: PHYSICIAN ASSISTANT

## 2019-01-01 PROCEDURE — 92920 PRQ TRLUML C ANGIOP 1ART&/BR: CPT | Mod: LC | Performed by: INTERNAL MEDICINE

## 2019-01-01 PROCEDURE — 94760 N-INVAS EAR/PLS OXIMETRY 1: CPT

## 2019-01-01 PROCEDURE — 96372 THER/PROPH/DIAG INJ SC/IM: CPT | Mod: 59 | Performed by: EMERGENCY MEDICINE

## 2019-01-01 PROCEDURE — 93458 L HRT ARTERY/VENTRICLE ANGIO: CPT | Performed by: INTERNAL MEDICINE

## 2019-01-01 PROCEDURE — 93010 ELECTROCARDIOGRAM REPORT: CPT | Mod: 59,76,, | Performed by: INTERNAL MEDICINE

## 2019-01-01 PROCEDURE — S0028 INJECTION, FAMOTIDINE, 20 MG: HCPCS | Performed by: HOSPITALIST

## 2019-01-01 PROCEDURE — 97161 PT EVAL LOW COMPLEX 20 MIN: CPT | Performed by: PHYSICAL THERAPIST

## 2019-01-01 PROCEDURE — 63600175 PHARM REV CODE 636 W HCPCS

## 2019-01-01 PROCEDURE — 96375 TX/PRO/DX INJ NEW DRUG ADDON: CPT | Mod: 59

## 2019-01-01 PROCEDURE — 92920 PRQ TRLUML C ANGIOP 1ART&/BR: CPT | Mod: 59,LC | Performed by: INTERNAL MEDICINE

## 2019-01-01 PROCEDURE — 80048 BASIC METABOLIC PNL TOTAL CA: CPT | Mod: 91

## 2019-01-01 DEVICE — STENT RONYX35038UX RESOLUTE ONYX 3.50X38
Type: IMPLANTABLE DEVICE | Site: HEART | Status: FUNCTIONAL
Brand: RESOLUTE ONYX™

## 2019-01-01 DEVICE — STENT RONYX30022UX RESOLUTE ONYX 3.00X22
Type: IMPLANTABLE DEVICE | Site: HEART | Status: FUNCTIONAL
Brand: RESOLUTE ONYX™

## 2019-01-01 RX ORDER — RANOLAZINE 500 MG/1
500 TABLET, EXTENDED RELEASE ORAL 2 TIMES DAILY
Status: DISCONTINUED | OUTPATIENT
Start: 2019-01-01 | End: 2019-01-01 | Stop reason: HOSPADM

## 2019-01-01 RX ORDER — NITROGLYCERIN 0.4 MG/1
0.4 TABLET SUBLINGUAL
Status: COMPLETED | OUTPATIENT
Start: 2019-01-01 | End: 2019-01-01

## 2019-01-01 RX ORDER — ONDANSETRON 2 MG/ML
4 INJECTION INTRAMUSCULAR; INTRAVENOUS EVERY 8 HOURS PRN
Status: DISCONTINUED | OUTPATIENT
Start: 2019-01-01 | End: 2019-01-01

## 2019-01-01 RX ORDER — INSULIN ASPART 100 [IU]/ML
1-10 INJECTION, SOLUTION INTRAVENOUS; SUBCUTANEOUS
Status: DISCONTINUED | OUTPATIENT
Start: 2019-01-01 | End: 2019-01-01 | Stop reason: HOSPADM

## 2019-01-01 RX ORDER — AMIODARONE HYDROCHLORIDE 150 MG/3ML
INJECTION, SOLUTION INTRAVENOUS CODE/TRAUMA/SEDATION MEDICATION
Status: COMPLETED | OUTPATIENT
Start: 2019-01-01 | End: 2019-01-01

## 2019-01-01 RX ORDER — LISINOPRIL 20 MG/1
40 TABLET ORAL DAILY
Status: DISCONTINUED | OUTPATIENT
Start: 2019-01-01 | End: 2019-01-01 | Stop reason: HOSPADM

## 2019-01-01 RX ORDER — IPRATROPIUM BROMIDE AND ALBUTEROL SULFATE 2.5; .5 MG/3ML; MG/3ML
3 SOLUTION RESPIRATORY (INHALATION)
Status: COMPLETED | OUTPATIENT
Start: 2019-01-01 | End: 2019-01-01

## 2019-01-01 RX ORDER — IBUPROFEN 200 MG
24 TABLET ORAL
Status: DISCONTINUED | OUTPATIENT
Start: 2019-01-01 | End: 2019-01-01 | Stop reason: HOSPADM

## 2019-01-01 RX ORDER — ASPIRIN 325 MG
325 TABLET ORAL
Status: COMPLETED | OUTPATIENT
Start: 2019-01-01 | End: 2019-01-01

## 2019-01-01 RX ORDER — PANTOPRAZOLE SODIUM 40 MG/1
40 TABLET, DELAYED RELEASE ORAL DAILY
Status: DISCONTINUED | OUTPATIENT
Start: 2019-01-01 | End: 2019-01-01 | Stop reason: HOSPADM

## 2019-01-01 RX ORDER — PREDNISONE 50 MG/1
50 TABLET ORAL DAILY
Status: DISCONTINUED | OUTPATIENT
Start: 2019-01-01 | End: 2019-01-01

## 2019-01-01 RX ORDER — MIDAZOLAM HYDROCHLORIDE 1 MG/ML
INJECTION, SOLUTION INTRAMUSCULAR; INTRAVENOUS
Status: DISCONTINUED | OUTPATIENT
Start: 2019-01-01 | End: 2019-01-01 | Stop reason: HOSPADM

## 2019-01-01 RX ORDER — METOPROLOL TARTRATE 25 MG/1
25 TABLET, FILM COATED ORAL 2 TIMES DAILY
Status: DISCONTINUED | OUTPATIENT
Start: 2019-01-01 | End: 2019-01-01

## 2019-01-01 RX ORDER — PROPOFOL 10 MG/ML
INJECTION, EMULSION INTRAVENOUS
Status: COMPLETED
Start: 2019-01-01 | End: 2019-01-01

## 2019-01-01 RX ORDER — ATORVASTATIN CALCIUM 40 MG/1
80 TABLET, FILM COATED ORAL NIGHTLY
Status: DISCONTINUED | OUTPATIENT
Start: 2019-01-01 | End: 2019-01-01 | Stop reason: HOSPADM

## 2019-01-01 RX ORDER — NOREPINEPHRINE BITARTRATE/D5W 4MG/250ML
PLASTIC BAG, INJECTION (ML) INTRAVENOUS
Status: DISCONTINUED
Start: 2019-01-01 | End: 2019-07-17 | Stop reason: HOSPADM

## 2019-01-01 RX ORDER — FAMOTIDINE 10 MG/ML
20 INJECTION INTRAVENOUS EVERY 12 HOURS
Status: DISCONTINUED | OUTPATIENT
Start: 2019-01-01 | End: 2019-01-01 | Stop reason: HOSPADM

## 2019-01-01 RX ORDER — IPRATROPIUM BROMIDE AND ALBUTEROL SULFATE 2.5; .5 MG/3ML; MG/3ML
3 SOLUTION RESPIRATORY (INHALATION)
Status: DISCONTINUED | OUTPATIENT
Start: 2019-01-01 | End: 2019-01-01

## 2019-01-01 RX ORDER — ACETAMINOPHEN 325 MG/1
650 TABLET ORAL EVERY 6 HOURS PRN
Status: DISCONTINUED | OUTPATIENT
Start: 2019-01-01 | End: 2019-01-01 | Stop reason: HOSPADM

## 2019-01-01 RX ORDER — IPRATROPIUM BROMIDE 0.5 MG/2.5ML
0.5 SOLUTION RESPIRATORY (INHALATION)
Status: COMPLETED | OUTPATIENT
Start: 2019-01-01 | End: 2019-01-01

## 2019-01-01 RX ORDER — ALBUTEROL SULFATE 2.5 MG/.5ML
12.5 SOLUTION RESPIRATORY (INHALATION)
Status: COMPLETED | OUTPATIENT
Start: 2019-01-01 | End: 2019-01-01

## 2019-01-01 RX ORDER — METHYLPREDNISOLONE SOD SUCC 125 MG
80 VIAL (EA) INJECTION EVERY 8 HOURS
Status: DISCONTINUED | OUTPATIENT
Start: 2019-01-01 | End: 2019-01-01

## 2019-01-01 RX ORDER — FAMOTIDINE 10 MG/ML
20 INJECTION INTRAVENOUS
Status: COMPLETED | OUTPATIENT
Start: 2019-01-01 | End: 2019-01-01

## 2019-01-01 RX ORDER — CLONIDINE HYDROCHLORIDE 0.1 MG/1
0.1 TABLET ORAL EVERY 8 HOURS PRN
Status: DISCONTINUED | OUTPATIENT
Start: 2019-01-01 | End: 2019-01-01 | Stop reason: HOSPADM

## 2019-01-01 RX ORDER — METOPROLOL TARTRATE 25 MG/1
25 TABLET, FILM COATED ORAL 2 TIMES DAILY
Status: DISCONTINUED | OUTPATIENT
Start: 2019-01-01 | End: 2019-01-01 | Stop reason: HOSPADM

## 2019-01-01 RX ORDER — PREDNISONE 20 MG/1
40 TABLET ORAL DAILY
Status: DISCONTINUED | OUTPATIENT
Start: 2019-01-01 | End: 2019-01-01

## 2019-01-01 RX ORDER — FUROSEMIDE 10 MG/ML
20 INJECTION INTRAMUSCULAR; INTRAVENOUS
Status: COMPLETED | OUTPATIENT
Start: 2019-01-01 | End: 2019-01-01

## 2019-01-01 RX ORDER — INSULIN ASPART 100 [IU]/ML
0-5 INJECTION, SOLUTION INTRAVENOUS; SUBCUTANEOUS
Status: DISCONTINUED | OUTPATIENT
Start: 2019-01-01 | End: 2019-01-01

## 2019-01-01 RX ORDER — CLOPIDOGREL BISULFATE 75 MG/1
75 TABLET ORAL DAILY
Status: DISCONTINUED | OUTPATIENT
Start: 2019-01-01 | End: 2019-01-01 | Stop reason: SDUPTHER

## 2019-01-01 RX ORDER — SODIUM CHLORIDE 0.9 % (FLUSH) 0.9 %
5 SYRINGE (ML) INJECTION
Status: DISCONTINUED | OUTPATIENT
Start: 2019-01-01 | End: 2019-01-01 | Stop reason: HOSPADM

## 2019-01-01 RX ORDER — METHYLPREDNISOLONE SOD SUCC 125 MG
125 VIAL (EA) INJECTION
Status: COMPLETED | OUTPATIENT
Start: 2019-01-01 | End: 2019-01-01

## 2019-01-01 RX ORDER — METOPROLOL TARTRATE 1 MG/ML
5 INJECTION, SOLUTION INTRAVENOUS EVERY 5 MIN PRN
Status: DISCONTINUED | OUTPATIENT
Start: 2019-01-01 | End: 2019-01-01 | Stop reason: HOSPADM

## 2019-01-01 RX ORDER — IBUPROFEN 200 MG
24 TABLET ORAL
Status: DISCONTINUED | OUTPATIENT
Start: 2019-01-01 | End: 2019-01-01

## 2019-01-01 RX ORDER — FLUTICASONE PROPIONATE 50 MCG
2 SPRAY, SUSPENSION (ML) NASAL DAILY
Status: DISCONTINUED | OUTPATIENT
Start: 2019-01-01 | End: 2019-01-01 | Stop reason: HOSPADM

## 2019-01-01 RX ORDER — PREDNISONE 20 MG/1
60 TABLET ORAL
Status: COMPLETED | OUTPATIENT
Start: 2019-01-01 | End: 2019-01-01

## 2019-01-01 RX ORDER — IMATINIB MESYLATE 100 MG/1
400 TABLET, FILM COATED ORAL DAILY
Status: DISCONTINUED | OUTPATIENT
Start: 2019-01-01 | End: 2019-01-01 | Stop reason: HOSPADM

## 2019-01-01 RX ORDER — IBUPROFEN 200 MG
1 TABLET ORAL DAILY
Qty: 28 PATCH | Refills: 11 | Status: SHIPPED | OUTPATIENT
Start: 2019-01-01

## 2019-01-01 RX ORDER — ASPIRIN 81 MG/1
81 TABLET ORAL DAILY
Status: DISCONTINUED | OUTPATIENT
Start: 2019-01-01 | End: 2019-01-01 | Stop reason: HOSPADM

## 2019-01-01 RX ORDER — LORAZEPAM 2 MG/ML
0.5 INJECTION INTRAMUSCULAR
Status: COMPLETED | OUTPATIENT
Start: 2019-01-01 | End: 2019-01-01

## 2019-01-01 RX ORDER — ENOXAPARIN SODIUM 100 MG/ML
40 INJECTION SUBCUTANEOUS EVERY 24 HOURS
Status: DISCONTINUED | OUTPATIENT
Start: 2019-01-01 | End: 2019-01-01

## 2019-01-01 RX ORDER — FUROSEMIDE 10 MG/ML
80 INJECTION INTRAMUSCULAR; INTRAVENOUS
Status: COMPLETED | OUTPATIENT
Start: 2019-01-01 | End: 2019-01-01

## 2019-01-01 RX ORDER — IMATINIB MESYLATE 400 MG/1
400 TABLET, FILM COATED ORAL DAILY
Qty: 30 TABLET | Refills: 2 | Status: SHIPPED | OUTPATIENT
Start: 2019-01-01

## 2019-01-01 RX ORDER — FUROSEMIDE 10 MG/ML
20 INJECTION INTRAMUSCULAR; INTRAVENOUS 2 TIMES DAILY
Status: DISCONTINUED | OUTPATIENT
Start: 2019-01-01 | End: 2019-01-01 | Stop reason: HOSPADM

## 2019-01-01 RX ORDER — CALCIUM CARBONATE 200(500)MG
500 TABLET,CHEWABLE ORAL DAILY PRN
Status: DISCONTINUED | OUTPATIENT
Start: 2019-01-01 | End: 2019-01-01 | Stop reason: HOSPADM

## 2019-01-01 RX ORDER — FUROSEMIDE 10 MG/ML
40 INJECTION INTRAMUSCULAR; INTRAVENOUS
Status: COMPLETED | OUTPATIENT
Start: 2019-01-01 | End: 2019-01-01

## 2019-01-01 RX ORDER — ISOSORBIDE MONONITRATE 30 MG/1
30 TABLET, EXTENDED RELEASE ORAL DAILY
Status: DISCONTINUED | OUTPATIENT
Start: 2019-01-01 | End: 2019-01-01 | Stop reason: HOSPADM

## 2019-01-01 RX ORDER — FUROSEMIDE 40 MG/1
40 TABLET ORAL DAILY
Qty: 30 TABLET | Refills: 3 | Status: ON HOLD | OUTPATIENT
Start: 2019-01-01 | End: 2019-01-01

## 2019-01-01 RX ORDER — ONDANSETRON 2 MG/ML
8 INJECTION INTRAMUSCULAR; INTRAVENOUS EVERY 8 HOURS PRN
Status: DISCONTINUED | OUTPATIENT
Start: 2019-01-01 | End: 2019-01-01 | Stop reason: HOSPADM

## 2019-01-01 RX ORDER — ATORVASTATIN CALCIUM 40 MG/1
80 TABLET, FILM COATED ORAL DAILY
Status: DISCONTINUED | OUTPATIENT
Start: 2019-01-01 | End: 2019-01-01

## 2019-01-01 RX ORDER — AMOXICILLIN 250 MG
1 CAPSULE ORAL 2 TIMES DAILY
Status: DISCONTINUED | OUTPATIENT
Start: 2019-01-01 | End: 2019-01-01 | Stop reason: HOSPADM

## 2019-01-01 RX ORDER — CALCIUM CHLORIDE INJECTION 100 MG/ML
INJECTION, SOLUTION INTRAVENOUS CODE/TRAUMA/SEDATION MEDICATION
Status: COMPLETED | OUTPATIENT
Start: 2019-01-01 | End: 2019-01-01

## 2019-01-01 RX ORDER — VERAPAMIL HYDROCHLORIDE 2.5 MG/ML
INJECTION, SOLUTION INTRAVENOUS
Status: DISCONTINUED | OUTPATIENT
Start: 2019-01-01 | End: 2019-01-01

## 2019-01-01 RX ORDER — FUROSEMIDE 40 MG/1
40 TABLET ORAL DAILY
Status: DISCONTINUED | OUTPATIENT
Start: 2019-01-01 | End: 2019-01-01 | Stop reason: HOSPADM

## 2019-01-01 RX ORDER — ALBUTEROL SULFATE 2.5 MG/.5ML
10 SOLUTION RESPIRATORY (INHALATION) CONTINUOUS
Status: DISCONTINUED | OUTPATIENT
Start: 2019-01-01 | End: 2019-01-01

## 2019-01-01 RX ORDER — ENOXAPARIN SODIUM 100 MG/ML
1 INJECTION SUBCUTANEOUS
Status: COMPLETED | OUTPATIENT
Start: 2019-01-01 | End: 2019-01-01

## 2019-01-01 RX ORDER — GLUCAGON 1 MG
1 KIT INJECTION
Status: DISCONTINUED | OUTPATIENT
Start: 2019-01-01 | End: 2019-01-01 | Stop reason: HOSPADM

## 2019-01-01 RX ORDER — INSULIN GLARGINE 100 [IU]/ML
18 INJECTION, SOLUTION SUBCUTANEOUS NIGHTLY
Start: 2019-01-01

## 2019-01-01 RX ORDER — ASPIRIN 325 MG
325 TABLET ORAL ONCE
Status: COMPLETED | OUTPATIENT
Start: 2019-01-01 | End: 2019-01-01

## 2019-01-01 RX ORDER — ETOMIDATE 2 MG/ML
INJECTION INTRAVENOUS
Status: DISPENSED
Start: 2019-01-01 | End: 2019-01-01

## 2019-01-01 RX ORDER — VANCOMYCIN HCL IN 5 % DEXTROSE 1G/250ML
15 PLASTIC BAG, INJECTION (ML) INTRAVENOUS
Status: DISCONTINUED | OUTPATIENT
Start: 2019-01-01 | End: 2019-01-01 | Stop reason: CLARIF

## 2019-01-01 RX ORDER — SODIUM CHLORIDE 9 MG/ML
INJECTION, SOLUTION INTRAVENOUS CONTINUOUS
Status: DISCONTINUED | OUTPATIENT
Start: 2019-01-01 | End: 2019-01-01

## 2019-01-01 RX ORDER — HYDRALAZINE HYDROCHLORIDE 20 MG/ML
10 INJECTION INTRAMUSCULAR; INTRAVENOUS EVERY 6 HOURS PRN
Status: DISCONTINUED | OUTPATIENT
Start: 2019-01-01 | End: 2019-01-01 | Stop reason: HOSPADM

## 2019-01-01 RX ORDER — NAPROXEN SODIUM 220 MG/1
162 TABLET, FILM COATED ORAL
Status: COMPLETED | OUTPATIENT
Start: 2019-01-01 | End: 2019-01-01

## 2019-01-01 RX ORDER — DIPHENHYDRAMINE HYDROCHLORIDE 50 MG/ML
25 INJECTION INTRAMUSCULAR; INTRAVENOUS ONCE
Status: COMPLETED | OUTPATIENT
Start: 2019-01-01 | End: 2019-01-01

## 2019-01-01 RX ORDER — HYDROXYZINE HYDROCHLORIDE 25 MG/1
25 TABLET, FILM COATED ORAL 3 TIMES DAILY PRN
Status: DISCONTINUED | OUTPATIENT
Start: 2019-01-01 | End: 2019-01-01

## 2019-01-01 RX ORDER — NITROGLYCERIN 20 MG/100ML
INJECTION INTRAVENOUS
Status: DISCONTINUED | OUTPATIENT
Start: 2019-01-01 | End: 2019-01-01

## 2019-01-01 RX ORDER — HYDROXYZINE HYDROCHLORIDE 25 MG/1
25 TABLET, FILM COATED ORAL 3 TIMES DAILY PRN
Status: DISCONTINUED | OUTPATIENT
Start: 2019-01-01 | End: 2019-01-01 | Stop reason: HOSPADM

## 2019-01-01 RX ORDER — ISOSORBIDE DINITRATE AND HYDRALAZINE HYDROCHLORIDE 37.5; 2 MG/1; MG/1
1 TABLET ORAL 2 TIMES DAILY
Status: DISCONTINUED | OUTPATIENT
Start: 2019-01-01 | End: 2019-01-01 | Stop reason: HOSPADM

## 2019-01-01 RX ORDER — ONDANSETRON 2 MG/ML
4 INJECTION INTRAMUSCULAR; INTRAVENOUS EVERY 12 HOURS PRN
Status: DISCONTINUED | OUTPATIENT
Start: 2019-01-01 | End: 2019-01-01

## 2019-01-01 RX ORDER — ACETAMINOPHEN 325 MG/1
650 TABLET ORAL EVERY 4 HOURS PRN
Status: DISCONTINUED | OUTPATIENT
Start: 2019-01-01 | End: 2019-01-01 | Stop reason: HOSPADM

## 2019-01-01 RX ORDER — TIOTROPIUM BROMIDE 18 UG/1
18 CAPSULE ORAL; RESPIRATORY (INHALATION) DAILY
Status: DISCONTINUED | OUTPATIENT
Start: 2019-01-01 | End: 2019-01-01 | Stop reason: HOSPADM

## 2019-01-01 RX ORDER — AMLODIPINE BESYLATE 5 MG/1
10 TABLET ORAL DAILY
Status: DISCONTINUED | OUTPATIENT
Start: 2019-01-01 | End: 2019-01-01 | Stop reason: HOSPADM

## 2019-01-01 RX ORDER — LIDOCAINE HYDROCHLORIDE 10 MG/ML
INJECTION, SOLUTION EPIDURAL; INFILTRATION; INTRACAUDAL; PERINEURAL
Status: DISCONTINUED | OUTPATIENT
Start: 2019-01-01 | End: 2019-01-01 | Stop reason: HOSPADM

## 2019-01-01 RX ORDER — IPRATROPIUM BROMIDE AND ALBUTEROL SULFATE 2.5; .5 MG/3ML; MG/3ML
3 SOLUTION RESPIRATORY (INHALATION)
Status: DISCONTINUED | OUTPATIENT
Start: 2019-01-01 | End: 2019-01-01 | Stop reason: HOSPADM

## 2019-01-01 RX ORDER — NITROGLYCERIN 0.4 MG/1
0.4 TABLET SUBLINGUAL EVERY 5 MIN PRN
Status: DISCONTINUED | OUTPATIENT
Start: 2019-01-01 | End: 2019-01-01 | Stop reason: HOSPADM

## 2019-01-01 RX ORDER — ATORVASTATIN CALCIUM 40 MG/1
80 TABLET, FILM COATED ORAL DAILY
Status: DISCONTINUED | OUTPATIENT
Start: 2019-01-01 | End: 2019-01-01 | Stop reason: HOSPADM

## 2019-01-01 RX ORDER — CLOPIDOGREL BISULFATE 75 MG/1
75 TABLET ORAL DAILY
Status: DISCONTINUED | OUTPATIENT
Start: 2019-01-01 | End: 2019-01-01 | Stop reason: HOSPADM

## 2019-01-01 RX ORDER — SUCCINYLCHOLINE CHLORIDE 20 MG/ML
INJECTION INTRAMUSCULAR; INTRAVENOUS
Status: COMPLETED
Start: 2019-01-01 | End: 2019-01-01

## 2019-01-01 RX ORDER — PROPOFOL 10 MG/ML
5 INJECTION, EMULSION INTRAVENOUS CONTINUOUS
Status: DISCONTINUED | OUTPATIENT
Start: 2019-01-01 | End: 2019-01-01

## 2019-01-01 RX ORDER — IPRATROPIUM BROMIDE AND ALBUTEROL SULFATE 2.5; .5 MG/3ML; MG/3ML
3 SOLUTION RESPIRATORY (INHALATION) EVERY 4 HOURS
Status: DISCONTINUED | OUTPATIENT
Start: 2019-01-01 | End: 2019-01-01 | Stop reason: HOSPADM

## 2019-01-01 RX ORDER — SODIUM BICARBONATE 1 MEQ/ML
SYRINGE (ML) INTRAVENOUS CODE/TRAUMA/SEDATION MEDICATION
Status: COMPLETED | OUTPATIENT
Start: 2019-01-01 | End: 2019-01-01

## 2019-01-01 RX ORDER — MAGNESIUM SULFATE 500 MG/ML
INJECTION, SOLUTION INTRAMUSCULAR; INTRAVENOUS CODE/TRAUMA/SEDATION MEDICATION
Status: COMPLETED | OUTPATIENT
Start: 2019-01-01 | End: 2019-01-01

## 2019-01-01 RX ORDER — FUROSEMIDE 40 MG/1
40 TABLET ORAL 2 TIMES DAILY
Qty: 60 TABLET | Refills: 11 | Status: SHIPPED | OUTPATIENT
Start: 2019-01-01

## 2019-01-01 RX ORDER — ACETAMINOPHEN 325 MG/1
650 TABLET ORAL EVERY 4 HOURS PRN
Refills: 0 | COMMUNITY
Start: 2019-01-01

## 2019-01-01 RX ORDER — FENTANYL CITRATE 50 UG/ML
INJECTION, SOLUTION INTRAMUSCULAR; INTRAVENOUS
Status: DISCONTINUED | OUTPATIENT
Start: 2019-01-01 | End: 2019-01-01 | Stop reason: HOSPADM

## 2019-01-01 RX ORDER — NITROGLYCERIN 20 MG/100ML
5 INJECTION INTRAVENOUS CONTINUOUS
Status: DISCONTINUED | OUTPATIENT
Start: 2019-01-01 | End: 2019-01-01

## 2019-01-01 RX ORDER — ACETAMINOPHEN 325 MG/1
650 TABLET ORAL EVERY 8 HOURS PRN
Status: DISCONTINUED | OUTPATIENT
Start: 2019-01-01 | End: 2019-01-01 | Stop reason: HOSPADM

## 2019-01-01 RX ORDER — LORAZEPAM 2 MG/ML
1 INJECTION INTRAMUSCULAR ONCE
Status: COMPLETED | OUTPATIENT
Start: 2019-01-01 | End: 2019-01-01

## 2019-01-01 RX ORDER — IBUPROFEN 200 MG
16 TABLET ORAL
Status: DISCONTINUED | OUTPATIENT
Start: 2019-01-01 | End: 2019-01-01 | Stop reason: HOSPADM

## 2019-01-01 RX ORDER — INSULIN ASPART 100 [IU]/ML
0-5 INJECTION, SOLUTION INTRAVENOUS; SUBCUTANEOUS
Status: DISCONTINUED | OUTPATIENT
Start: 2019-01-01 | End: 2019-01-01 | Stop reason: HOSPADM

## 2019-01-01 RX ORDER — GLUCAGON 1 MG
1 KIT INJECTION
Status: DISCONTINUED | OUTPATIENT
Start: 2019-01-01 | End: 2019-01-01

## 2019-01-01 RX ORDER — VANCOMYCIN HCL IN 5 % DEXTROSE 1G/250ML
15 PLASTIC BAG, INJECTION (ML) INTRAVENOUS
Status: DISCONTINUED | OUTPATIENT
Start: 2019-01-01 | End: 2019-01-01 | Stop reason: DRUGHIGH

## 2019-01-01 RX ORDER — PREDNISONE 20 MG/1
40 TABLET ORAL DAILY
Status: DISCONTINUED | OUTPATIENT
Start: 2019-01-01 | End: 2019-01-01 | Stop reason: HOSPADM

## 2019-01-01 RX ORDER — FAMOTIDINE 20 MG/1
20 TABLET, FILM COATED ORAL 2 TIMES DAILY
Status: DISCONTINUED | OUTPATIENT
Start: 2019-01-01 | End: 2019-01-01 | Stop reason: HOSPADM

## 2019-01-01 RX ORDER — FUROSEMIDE 10 MG/ML
40 INJECTION INTRAMUSCULAR; INTRAVENOUS 2 TIMES DAILY
Status: DISCONTINUED | OUTPATIENT
Start: 2019-01-01 | End: 2019-01-01

## 2019-01-01 RX ORDER — ASPIRIN 81 MG/1
81 TABLET ORAL DAILY
Status: DISCONTINUED | OUTPATIENT
Start: 2019-01-01 | End: 2019-01-01 | Stop reason: SDUPTHER

## 2019-01-01 RX ORDER — ATROPINE SULFATE 0.1 MG/ML
0.5 INJECTION INTRAVENOUS
Status: DISCONTINUED | OUTPATIENT
Start: 2019-01-01 | End: 2019-01-01 | Stop reason: HOSPADM

## 2019-01-01 RX ORDER — PROPOFOL 10 MG/ML
20 INJECTION, EMULSION INTRAVENOUS
Status: COMPLETED | OUTPATIENT
Start: 2019-01-01 | End: 2019-01-01

## 2019-01-01 RX ORDER — CLOPIDOGREL 300 MG/1
600 TABLET, FILM COATED ORAL ONCE
Status: COMPLETED | OUTPATIENT
Start: 2019-01-01 | End: 2019-01-01

## 2019-01-01 RX ORDER — ENOXAPARIN SODIUM 100 MG/ML
40 INJECTION SUBCUTANEOUS EVERY 24 HOURS
Status: DISCONTINUED | OUTPATIENT
Start: 2019-01-01 | End: 2019-01-01 | Stop reason: HOSPADM

## 2019-01-01 RX ORDER — CLOPIDOGREL BISULFATE 75 MG/1
300 TABLET ORAL ONCE
Status: COMPLETED | OUTPATIENT
Start: 2019-01-01 | End: 2019-01-01

## 2019-01-01 RX ORDER — ALBUTEROL SULFATE 2.5 MG/.5ML
2.5 SOLUTION RESPIRATORY (INHALATION)
Status: COMPLETED | OUTPATIENT
Start: 2019-01-01 | End: 2019-01-01

## 2019-01-01 RX ORDER — PREDNISONE 20 MG/1
60 TABLET ORAL DAILY
Status: DISCONTINUED | OUTPATIENT
Start: 2019-01-01 | End: 2019-01-01

## 2019-01-01 RX ORDER — INSULIN ASPART 100 [IU]/ML
0-5 INJECTION, SOLUTION INTRAVENOUS; SUBCUTANEOUS EVERY 6 HOURS PRN
Status: DISCONTINUED | OUTPATIENT
Start: 2019-01-01 | End: 2019-01-01

## 2019-01-01 RX ORDER — ENOXAPARIN SODIUM 100 MG/ML
1 INJECTION SUBCUTANEOUS
Status: DISCONTINUED | OUTPATIENT
Start: 2019-01-01 | End: 2019-01-01

## 2019-01-01 RX ORDER — ONDANSETRON 2 MG/ML
4 INJECTION INTRAMUSCULAR; INTRAVENOUS EVERY 8 HOURS PRN
Status: DISCONTINUED | OUTPATIENT
Start: 2019-01-01 | End: 2019-01-01 | Stop reason: HOSPADM

## 2019-01-01 RX ORDER — SODIUM CHLORIDE 0.9 % (FLUSH) 0.9 %
10 SYRINGE (ML) INJECTION
Status: DISCONTINUED | OUTPATIENT
Start: 2019-01-01 | End: 2019-01-01 | Stop reason: HOSPADM

## 2019-01-01 RX ORDER — MAGNESIUM SULFATE HEPTAHYDRATE 40 MG/ML
2 INJECTION, SOLUTION INTRAVENOUS
Status: COMPLETED | OUTPATIENT
Start: 2019-01-01 | End: 2019-01-01

## 2019-01-01 RX ORDER — EPINEPHRINE 0.1 MG/ML
INJECTION INTRAVENOUS CODE/TRAUMA/SEDATION MEDICATION
Status: COMPLETED | OUTPATIENT
Start: 2019-01-01 | End: 2019-01-01

## 2019-01-01 RX ORDER — ALENDRONATE SODIUM 70 MG/1
70 TABLET ORAL
COMMUNITY
Start: 2018-01-01

## 2019-01-01 RX ORDER — PREDNISONE 20 MG/1
40 TABLET ORAL DAILY
Qty: 8 TABLET | Refills: 0 | Status: SHIPPED | OUTPATIENT
Start: 2019-01-01 | End: 2019-01-01

## 2019-01-01 RX ORDER — INSULIN ASPART 100 [IU]/ML
7 INJECTION, SOLUTION INTRAVENOUS; SUBCUTANEOUS
Status: DISCONTINUED | OUTPATIENT
Start: 2019-01-01 | End: 2019-01-01

## 2019-01-01 RX ORDER — FLUTICASONE FUROATE AND VILANTEROL 100; 25 UG/1; UG/1
1 POWDER RESPIRATORY (INHALATION) DAILY
Status: DISCONTINUED | OUTPATIENT
Start: 2019-01-01 | End: 2019-01-01 | Stop reason: HOSPADM

## 2019-01-01 RX ORDER — DEXMEDETOMIDINE HYDROCHLORIDE 4 UG/ML
0.2 INJECTION INTRAVENOUS CONTINUOUS
Status: DISCONTINUED | OUTPATIENT
Start: 2019-01-01 | End: 2019-01-01

## 2019-01-01 RX ORDER — FUROSEMIDE 40 MG/1
40 TABLET ORAL 2 TIMES DAILY
Status: DISCONTINUED | OUTPATIENT
Start: 2019-01-01 | End: 2019-01-01 | Stop reason: HOSPADM

## 2019-01-01 RX ORDER — MIDAZOLAM HYDROCHLORIDE 1 MG/ML
INJECTION, SOLUTION INTRAMUSCULAR; INTRAVENOUS
Status: DISCONTINUED | OUTPATIENT
Start: 2019-01-01 | End: 2019-01-01

## 2019-01-01 RX ORDER — FENTANYL CITRATE 50 UG/ML
INJECTION, SOLUTION INTRAMUSCULAR; INTRAVENOUS
Status: DISCONTINUED | OUTPATIENT
Start: 2019-01-01 | End: 2019-01-01

## 2019-01-01 RX ORDER — IBUPROFEN 200 MG
16 TABLET ORAL
Status: DISCONTINUED | OUTPATIENT
Start: 2019-01-01 | End: 2019-01-01

## 2019-01-01 RX ORDER — METHYLPREDNISOLONE SOD SUCC 125 MG
125 VIAL (EA) INJECTION ONCE
Status: COMPLETED | OUTPATIENT
Start: 2019-01-01 | End: 2019-01-01

## 2019-01-01 RX ORDER — ONDANSETRON 2 MG/ML
4 INJECTION INTRAMUSCULAR; INTRAVENOUS EVERY 12 HOURS PRN
Status: DISCONTINUED | OUTPATIENT
Start: 2019-01-01 | End: 2019-01-01 | Stop reason: HOSPADM

## 2019-01-01 RX ORDER — IPRATROPIUM BROMIDE AND ALBUTEROL SULFATE 2.5; .5 MG/3ML; MG/3ML
3 SOLUTION RESPIRATORY (INHALATION) EVERY 6 HOURS
Status: DISCONTINUED | OUTPATIENT
Start: 2019-01-01 | End: 2019-01-01 | Stop reason: HOSPADM

## 2019-01-01 RX ORDER — ONDANSETRON 2 MG/ML
4 INJECTION INTRAMUSCULAR; INTRAVENOUS
Status: COMPLETED | OUTPATIENT
Start: 2019-01-01 | End: 2019-01-01

## 2019-01-01 RX ORDER — MORPHINE SULFATE 10 MG/ML
2 INJECTION INTRAMUSCULAR; INTRAVENOUS; SUBCUTANEOUS
Status: DISCONTINUED | OUTPATIENT
Start: 2019-01-01 | End: 2019-01-01

## 2019-01-01 RX ORDER — DILTIAZEM HYDROCHLORIDE 5 MG/ML
0.25 INJECTION INTRAVENOUS
Status: DISCONTINUED | OUTPATIENT
Start: 2019-01-01 | End: 2019-07-17 | Stop reason: HOSPADM

## 2019-01-01 RX ORDER — HEPARIN SODIUM 1000 [USP'U]/ML
INJECTION, SOLUTION INTRAVENOUS; SUBCUTANEOUS
Status: DISCONTINUED | OUTPATIENT
Start: 2019-01-01 | End: 2019-01-01

## 2019-01-01 RX ORDER — LEVETIRACETAM 500 MG/1
1000 TABLET ORAL 2 TIMES DAILY
Status: DISCONTINUED | OUTPATIENT
Start: 2019-01-01 | End: 2019-01-01 | Stop reason: HOSPADM

## 2019-01-01 RX ORDER — ONDANSETRON 2 MG/ML
8 INJECTION INTRAMUSCULAR; INTRAVENOUS EVERY 6 HOURS PRN
Status: DISCONTINUED | OUTPATIENT
Start: 2019-01-01 | End: 2019-01-01 | Stop reason: HOSPADM

## 2019-01-01 RX ORDER — FUROSEMIDE 10 MG/ML
40 INJECTION INTRAMUSCULAR; INTRAVENOUS DAILY
Status: DISCONTINUED | OUTPATIENT
Start: 2019-01-01 | End: 2019-01-01 | Stop reason: HOSPADM

## 2019-01-01 RX ORDER — ETOMIDATE 2 MG/ML
10 INJECTION INTRAVENOUS
Status: COMPLETED | OUTPATIENT
Start: 2019-01-01 | End: 2019-01-01

## 2019-01-01 RX ORDER — SUCCINYLCHOLINE CHLORIDE 20 MG/ML
100 INJECTION INTRAMUSCULAR; INTRAVENOUS
Status: COMPLETED | OUTPATIENT
Start: 2019-01-01 | End: 2019-01-01

## 2019-01-01 RX ORDER — ATROPINE SULFATE 0.1 MG/ML
0.5 INJECTION INTRAVENOUS
Status: DISCONTINUED | OUTPATIENT
Start: 2019-01-01 | End: 2019-01-01

## 2019-01-01 RX ORDER — NAPROXEN SODIUM 220 MG/1
81 TABLET, FILM COATED ORAL DAILY
Status: DISCONTINUED | OUTPATIENT
Start: 2019-01-01 | End: 2019-01-01 | Stop reason: HOSPADM

## 2019-01-01 RX ORDER — ASPIRIN 325 MG
325 TABLET ORAL
Status: DISCONTINUED | OUTPATIENT
Start: 2019-01-01 | End: 2019-01-01

## 2019-01-01 RX ORDER — LEVOFLOXACIN 500 MG/1
500 TABLET, FILM COATED ORAL DAILY
Qty: 5 TABLET | Refills: 0 | Status: SHIPPED | OUTPATIENT
Start: 2019-01-01 | End: 2019-01-01

## 2019-01-01 RX ORDER — PROPOFOL 10 MG/ML
INJECTION, EMULSION INTRAVENOUS
Status: DISPENSED
Start: 2019-01-01 | End: 2019-01-01

## 2019-01-01 RX ORDER — ACETAMINOPHEN 325 MG/1
650 TABLET ORAL EVERY 4 HOURS PRN
Status: DISCONTINUED | OUTPATIENT
Start: 2019-01-01 | End: 2019-01-01

## 2019-01-01 RX ORDER — ACETAMINOPHEN 325 MG/1
650 TABLET ORAL EVERY 8 HOURS PRN
Status: DISCONTINUED | OUTPATIENT
Start: 2019-01-01 | End: 2019-01-01

## 2019-01-01 RX ORDER — INSULIN ASPART 100 [IU]/ML
0-5 INJECTION, SOLUTION INTRAVENOUS; SUBCUTANEOUS EVERY 6 HOURS PRN
Status: DISCONTINUED | OUTPATIENT
Start: 2019-01-01 | End: 2019-01-01 | Stop reason: HOSPADM

## 2019-01-01 RX ORDER — INSULIN ASPART 100 [IU]/ML
3 INJECTION, SOLUTION INTRAVENOUS; SUBCUTANEOUS
Status: DISCONTINUED | OUTPATIENT
Start: 2019-01-01 | End: 2019-01-01

## 2019-01-01 RX ORDER — LIDOCAINE HYDROCHLORIDE 10 MG/ML
INJECTION, SOLUTION EPIDURAL; INFILTRATION; INTRACAUDAL; PERINEURAL
Status: DISCONTINUED | OUTPATIENT
Start: 2019-01-01 | End: 2019-01-01

## 2019-01-01 RX ORDER — CLONIDINE HYDROCHLORIDE 0.1 MG/1
0.1 TABLET ORAL 2 TIMES DAILY
Status: DISCONTINUED | OUTPATIENT
Start: 2019-01-01 | End: 2019-01-01 | Stop reason: HOSPADM

## 2019-01-01 RX ORDER — IBUPROFEN 200 MG
1 TABLET ORAL DAILY
Status: DISCONTINUED | OUTPATIENT
Start: 2019-01-01 | End: 2019-01-01 | Stop reason: HOSPADM

## 2019-01-01 RX ORDER — LEVOFLOXACIN 5 MG/ML
500 INJECTION, SOLUTION INTRAVENOUS
Status: DISCONTINUED | OUTPATIENT
Start: 2019-01-01 | End: 2019-01-01 | Stop reason: HOSPADM

## 2019-01-01 RX ORDER — ALPRAZOLAM 0.25 MG/1
0.25 TABLET ORAL 2 TIMES DAILY PRN
Status: DISCONTINUED | OUTPATIENT
Start: 2019-01-01 | End: 2019-01-01 | Stop reason: HOSPADM

## 2019-01-01 RX ADMIN — LORAZEPAM 0.5 MG: 2 INJECTION INTRAMUSCULAR; INTRAVENOUS at 01:03

## 2019-01-01 RX ADMIN — FAMOTIDINE 20 MG: 20 TABLET ORAL at 08:03

## 2019-01-01 RX ADMIN — INSULIN DETEMIR 15 UNITS: 100 INJECTION, SOLUTION SUBCUTANEOUS at 10:06

## 2019-01-01 RX ADMIN — RANOLAZINE 500 MG: 500 TABLET, FILM COATED, EXTENDED RELEASE ORAL at 08:06

## 2019-01-01 RX ADMIN — Medication 8 G: at 08:07

## 2019-01-01 RX ADMIN — IPRATROPIUM BROMIDE AND ALBUTEROL SULFATE 3 ML: .5; 3 SOLUTION RESPIRATORY (INHALATION) at 11:06

## 2019-01-01 RX ADMIN — INSULIN ASPART 5 UNITS: 100 INJECTION, SOLUTION INTRAVENOUS; SUBCUTANEOUS at 08:06

## 2019-01-01 RX ADMIN — CLONIDINE HYDROCHLORIDE 0.1 MG: 0.1 TABLET ORAL at 05:06

## 2019-01-01 RX ADMIN — AMIODARONE HYDROCHLORIDE 150 MG: 50 INJECTION, SOLUTION INTRAVENOUS at 02:07

## 2019-01-01 RX ADMIN — TIOTROPIUM BROMIDE 18 MCG: 18 CAPSULE ORAL; RESPIRATORY (INHALATION) at 08:07

## 2019-01-01 RX ADMIN — METOPROLOL TARTRATE 25 MG: 25 TABLET ORAL at 08:06

## 2019-01-01 RX ADMIN — Medication 500 MG: at 06:06

## 2019-01-01 RX ADMIN — IPRATROPIUM BROMIDE AND ALBUTEROL SULFATE 3 ML: .5; 3 SOLUTION RESPIRATORY (INHALATION) at 04:07

## 2019-01-01 RX ADMIN — INSULIN DETEMIR 5 UNITS: 100 INJECTION, SOLUTION SUBCUTANEOUS at 09:06

## 2019-01-01 RX ADMIN — CLONIDINE HYDROCHLORIDE 0.1 MG: 0.1 TABLET ORAL at 08:06

## 2019-01-01 RX ADMIN — DIPHENHYDRAMINE HYDROCHLORIDE 25 MG: 50 INJECTION INTRAMUSCULAR; INTRAVENOUS at 08:07

## 2019-01-01 RX ADMIN — CLONIDINE HYDROCHLORIDE 0.1 MG: 0.1 TABLET ORAL at 09:06

## 2019-01-01 RX ADMIN — IPRATROPIUM BROMIDE AND ALBUTEROL SULFATE 3 ML: .5; 3 SOLUTION RESPIRATORY (INHALATION) at 07:07

## 2019-01-01 RX ADMIN — METHYLPREDNISOLONE SODIUM SUCCINATE 80 MG: 125 INJECTION, POWDER, FOR SOLUTION INTRAMUSCULAR; INTRAVENOUS at 06:06

## 2019-01-01 RX ADMIN — ASPIRIN 81 MG: 81 TABLET, COATED ORAL at 09:03

## 2019-01-01 RX ADMIN — FUROSEMIDE 40 MG: 40 TABLET ORAL at 08:06

## 2019-01-01 RX ADMIN — LORAZEPAM 1 MG: 2 INJECTION INTRAMUSCULAR; INTRAVENOUS at 08:06

## 2019-01-01 RX ADMIN — IPRATROPIUM BROMIDE AND ALBUTEROL SULFATE 3 ML: .5; 3 SOLUTION RESPIRATORY (INHALATION) at 11:07

## 2019-01-01 RX ADMIN — FAMOTIDINE 20 MG: 20 TABLET ORAL at 09:03

## 2019-01-01 RX ADMIN — FUROSEMIDE 40 MG: 10 INJECTION, SOLUTION INTRAVENOUS at 09:03

## 2019-01-01 RX ADMIN — PROPOFOL 35 MCG/KG/MIN: 10 INJECTION, EMULSION INTRAVENOUS at 04:06

## 2019-01-01 RX ADMIN — ENOXAPARIN SODIUM 40 MG: 100 INJECTION SUBCUTANEOUS at 05:07

## 2019-01-01 RX ADMIN — IPRATROPIUM BROMIDE AND ALBUTEROL SULFATE 3 ML: .5; 3 SOLUTION RESPIRATORY (INHALATION) at 02:06

## 2019-01-01 RX ADMIN — FUROSEMIDE 20 MG: 10 INJECTION, SOLUTION INTRAMUSCULAR; INTRAVENOUS at 05:02

## 2019-01-01 RX ADMIN — METHYLPREDNISOLONE SODIUM SUCCINATE 125 MG: 125 INJECTION, POWDER, FOR SOLUTION INTRAMUSCULAR; INTRAVENOUS at 12:07

## 2019-01-01 RX ADMIN — INSULIN DETEMIR 10 UNITS: 100 INJECTION, SOLUTION SUBCUTANEOUS at 08:03

## 2019-01-01 RX ADMIN — AZITHROMYCIN MONOHYDRATE 500 MG: 500 INJECTION, POWDER, LYOPHILIZED, FOR SOLUTION INTRAVENOUS at 12:02

## 2019-01-01 RX ADMIN — METHYLPREDNISOLONE SODIUM SUCCINATE 80 MG: 40 INJECTION, POWDER, FOR SOLUTION INTRAMUSCULAR; INTRAVENOUS at 12:06

## 2019-01-01 RX ADMIN — PANTOPRAZOLE SODIUM 40 MG: 40 TABLET, DELAYED RELEASE ORAL at 09:06

## 2019-01-01 RX ADMIN — METHYLPREDNISOLONE SODIUM SUCCINATE 125 MG: 125 INJECTION, POWDER, FOR SOLUTION INTRAMUSCULAR; INTRAVENOUS at 07:06

## 2019-01-01 RX ADMIN — METOPROLOL TARTRATE 25 MG: 25 TABLET ORAL at 08:07

## 2019-01-01 RX ADMIN — ASPIRIN 81 MG: 81 TABLET, COATED ORAL at 09:06

## 2019-01-01 RX ADMIN — ISOSORBIDE MONONITRATE 30 MG: 30 TABLET, EXTENDED RELEASE ORAL at 09:03

## 2019-01-01 RX ADMIN — HYDROCORTISONE SODIUM SUCCINATE 100 MG: 100 INJECTION, POWDER, FOR SOLUTION INTRAMUSCULAR; INTRAVENOUS at 08:07

## 2019-01-01 RX ADMIN — ATORVASTATIN CALCIUM 80 MG: 40 TABLET, FILM COATED ORAL at 09:07

## 2019-01-01 RX ADMIN — LEVOFLOXACIN 500 MG: 500 INJECTION, SOLUTION INTRAVENOUS at 11:03

## 2019-01-01 RX ADMIN — MAGNESIUM SULFATE IN WATER 2 G: 40 INJECTION, SOLUTION INTRAVENOUS at 04:06

## 2019-01-01 RX ADMIN — DOCUSATE SODIUM AND SENNOSIDES 1 TABLET: 8.6; 5 TABLET, FILM COATED ORAL at 09:03

## 2019-01-01 RX ADMIN — INSULIN ASPART 7 UNITS: 100 INJECTION, SOLUTION INTRAVENOUS; SUBCUTANEOUS at 04:06

## 2019-01-01 RX ADMIN — INSULIN DETEMIR 5 UNITS: 100 INJECTION, SOLUTION SUBCUTANEOUS at 12:06

## 2019-01-01 RX ADMIN — IPRATROPIUM BROMIDE AND ALBUTEROL SULFATE 3 ML: .5; 3 SOLUTION RESPIRATORY (INHALATION) at 07:06

## 2019-01-01 RX ADMIN — ENOXAPARIN SODIUM 60 MG: 100 INJECTION SUBCUTANEOUS at 03:06

## 2019-01-01 RX ADMIN — IPRATROPIUM BROMIDE AND ALBUTEROL SULFATE 3 ML: .5; 3 SOLUTION RESPIRATORY (INHALATION) at 08:03

## 2019-01-01 RX ADMIN — EPINEPHRINE 1 MG: 0.1 INJECTION, SOLUTION ENDOTRACHEAL; INTRACARDIAC; INTRAVENOUS at 02:07

## 2019-01-01 RX ADMIN — Medication 1000 MG: at 02:03

## 2019-01-01 RX ADMIN — IMATINIB MESYLATE 400 MG: 100 TABLET, FILM COATED ORAL at 05:03

## 2019-01-01 RX ADMIN — FAMOTIDINE 20 MG: 10 INJECTION INTRAVENOUS at 08:07

## 2019-01-01 RX ADMIN — ASPIRIN 81 MG 162 MG: 81 TABLET ORAL at 01:06

## 2019-01-01 RX ADMIN — LEVETIRACETAM 1000 MG: 500 TABLET, FILM COATED ORAL at 09:03

## 2019-01-01 RX ADMIN — IPRATROPIUM BROMIDE AND ALBUTEROL SULFATE 3 ML: .5; 3 SOLUTION RESPIRATORY (INHALATION) at 12:06

## 2019-01-01 RX ADMIN — PANTOPRAZOLE SODIUM 40 MG: 40 TABLET, DELAYED RELEASE ORAL at 08:06

## 2019-01-01 RX ADMIN — ENOXAPARIN SODIUM 60 MG: 100 INJECTION SUBCUTANEOUS at 05:06

## 2019-01-01 RX ADMIN — ASPIRIN 325 MG ORAL TABLET 325 MG: 325 PILL ORAL at 12:07

## 2019-01-01 RX ADMIN — CLOPIDOGREL BISULFATE 75 MG: 75 TABLET ORAL at 05:03

## 2019-01-01 RX ADMIN — INSULIN ASPART 3 UNITS: 100 INJECTION, SOLUTION INTRAVENOUS; SUBCUTANEOUS at 08:02

## 2019-01-01 RX ADMIN — FAMOTIDINE 20 MG: 10 INJECTION INTRAVENOUS at 09:06

## 2019-01-01 RX ADMIN — FLUTICASONE FUROATE AND VILANTEROL TRIFENATATE 1 PUFF: 100; 25 POWDER RESPIRATORY (INHALATION) at 07:06

## 2019-01-01 RX ADMIN — RANOLAZINE 500 MG: 500 TABLET, FILM COATED, EXTENDED RELEASE ORAL at 08:03

## 2019-01-01 RX ADMIN — NICOTINE 1 PATCH: 21 PATCH, EXTENDED RELEASE TRANSDERMAL at 09:03

## 2019-01-01 RX ADMIN — DEXMEDETOMIDINE HYDROCHLORIDE 1.4 MCG/KG/HR: 4 INJECTION INTRAVENOUS at 04:06

## 2019-01-01 RX ADMIN — DEXMEDETOMIDINE HYDROCHLORIDE 1.4 MCG/KG/HR: 4 INJECTION INTRAVENOUS at 08:06

## 2019-01-01 RX ADMIN — ENOXAPARIN SODIUM 60 MG: 100 INJECTION SUBCUTANEOUS at 03:07

## 2019-01-01 RX ADMIN — AMLODIPINE BESYLATE 10 MG: 5 TABLET ORAL at 08:06

## 2019-01-01 RX ADMIN — PREDNISONE 60 MG: 20 TABLET ORAL at 01:06

## 2019-01-01 RX ADMIN — INSULIN ASPART 5 UNITS: 100 INJECTION, SOLUTION INTRAVENOUS; SUBCUTANEOUS at 08:03

## 2019-01-01 RX ADMIN — AZITHROMYCIN MONOHYDRATE 500 MG: 500 INJECTION, POWDER, LYOPHILIZED, FOR SOLUTION INTRAVENOUS at 11:02

## 2019-01-01 RX ADMIN — ASPIRIN 325 MG ORAL TABLET 325 MG: 325 PILL ORAL at 02:06

## 2019-01-01 RX ADMIN — PREDNISONE 50 MG: 50 TABLET ORAL at 09:06

## 2019-01-01 RX ADMIN — ACETAMINOPHEN 650 MG: 325 TABLET, FILM COATED ORAL at 06:03

## 2019-01-01 RX ADMIN — FAMOTIDINE 20 MG: 10 INJECTION INTRAVENOUS at 09:07

## 2019-01-01 RX ADMIN — ENOXAPARIN SODIUM 40 MG: 100 INJECTION SUBCUTANEOUS at 05:02

## 2019-01-01 RX ADMIN — RANOLAZINE 500 MG: 500 TABLET, FILM COATED, EXTENDED RELEASE ORAL at 09:07

## 2019-01-01 RX ADMIN — IPRATROPIUM BROMIDE AND ALBUTEROL SULFATE 3 ML: .5; 3 SOLUTION RESPIRATORY (INHALATION) at 11:03

## 2019-01-01 RX ADMIN — IMATINIB MESYLATE 400 MG: 100 TABLET, FILM COATED ORAL at 09:03

## 2019-01-01 RX ADMIN — AZITHROMYCIN MONOHYDRATE 500 MG: 500 INJECTION, POWDER, LYOPHILIZED, FOR SOLUTION INTRAVENOUS at 12:03

## 2019-01-01 RX ADMIN — INSULIN ASPART 1 UNITS: 100 INJECTION, SOLUTION INTRAVENOUS; SUBCUTANEOUS at 09:07

## 2019-01-01 RX ADMIN — PROPOFOL 20 MCG/KG/MIN: 10 INJECTION, EMULSION INTRAVENOUS at 05:06

## 2019-01-01 RX ADMIN — ACETAMINOPHEN 650 MG: 325 TABLET, FILM COATED ORAL at 05:07

## 2019-01-01 RX ADMIN — METOPROLOL TARTRATE 25 MG: 25 TABLET ORAL at 12:06

## 2019-01-01 RX ADMIN — CEFTRIAXONE 1 G: 1 INJECTION, SOLUTION INTRAVENOUS at 10:02

## 2019-01-01 RX ADMIN — ISOSORBIDE MONONITRATE 30 MG: 30 TABLET, EXTENDED RELEASE ORAL at 05:03

## 2019-01-01 RX ADMIN — VANCOMYCIN HYDROCHLORIDE 1250 MG: 500 INJECTION, POWDER, LYOPHILIZED, FOR SOLUTION INTRAVENOUS at 02:03

## 2019-01-01 RX ADMIN — LISINOPRIL 40 MG: 20 TABLET ORAL at 08:06

## 2019-01-01 RX ADMIN — PIPERACILLIN AND TAZOBACTAM 4.5 G: 4; .5 INJECTION, POWDER, LYOPHILIZED, FOR SOLUTION INTRAVENOUS; PARENTERAL at 09:03

## 2019-01-01 RX ADMIN — PREDNISONE 60 MG: 20 TABLET ORAL at 05:03

## 2019-01-01 RX ADMIN — INSULIN ASPART 4 UNITS: 100 INJECTION, SOLUTION INTRAVENOUS; SUBCUTANEOUS at 11:07

## 2019-01-01 RX ADMIN — FUROSEMIDE 40 MG: 10 INJECTION, SOLUTION INTRAVENOUS at 03:06

## 2019-01-01 RX ADMIN — ALPRAZOLAM 0.25 MG: 0.25 TABLET ORAL at 12:03

## 2019-01-01 RX ADMIN — DEXMEDETOMIDINE HYDROCHLORIDE 1.4 MCG/KG/HR: 4 INJECTION INTRAVENOUS at 11:06

## 2019-01-01 RX ADMIN — METOPROLOL TARTRATE 25 MG: 25 TABLET ORAL at 09:07

## 2019-01-01 RX ADMIN — PIPERACILLIN AND TAZOBACTAM 4.5 G: 4; .5 INJECTION, POWDER, LYOPHILIZED, FOR SOLUTION INTRAVENOUS; PARENTERAL at 10:03

## 2019-01-01 RX ADMIN — DOCUSATE SODIUM AND SENNOSIDES 1 TABLET: 8.6; 5 TABLET, FILM COATED ORAL at 08:03

## 2019-01-01 RX ADMIN — HYDRALAZINE HYDROCHLORIDE AND ISOSORBIDE DINITRATE 1 TABLET: 37.5; 2 TABLET, FILM COATED ORAL at 09:07

## 2019-01-01 RX ADMIN — CLOPIDOGREL BISULFATE 75 MG: 75 TABLET ORAL at 08:06

## 2019-01-01 RX ADMIN — PANTOPRAZOLE SODIUM 40 MG: 40 TABLET, DELAYED RELEASE ORAL at 04:06

## 2019-01-01 RX ADMIN — ASPIRIN 325 MG ORAL TABLET 325 MG: 325 PILL ORAL at 10:03

## 2019-01-01 RX ADMIN — ENOXAPARIN SODIUM 40 MG: 100 INJECTION SUBCUTANEOUS at 05:03

## 2019-01-01 RX ADMIN — CLOPIDOGREL BISULFATE 75 MG: 75 TABLET ORAL at 09:06

## 2019-01-01 RX ADMIN — SODIUM CHLORIDE 1000 ML: 0.9 INJECTION, SOLUTION INTRAVENOUS at 11:07

## 2019-01-01 RX ADMIN — ASPIRIN 81 MG 81 MG: 81 TABLET ORAL at 08:06

## 2019-01-01 RX ADMIN — CALCIUM CHLORIDE 1 G: 100 INJECTION, SOLUTION INTRAVENOUS; INTRAVENTRICULAR at 02:07

## 2019-01-01 RX ADMIN — FAMOTIDINE 20 MG: 10 INJECTION INTRAVENOUS at 11:07

## 2019-01-01 RX ADMIN — ASPIRIN 81 MG: 81 TABLET, COATED ORAL at 05:03

## 2019-01-01 RX ADMIN — METHYLPREDNISOLONE SODIUM SUCCINATE 125 MG: 125 INJECTION, POWDER, FOR SOLUTION INTRAMUSCULAR; INTRAVENOUS at 05:02

## 2019-01-01 RX ADMIN — FLUTICASONE FUROATE AND VILANTEROL TRIFENATATE 1 PUFF: 100; 25 POWDER RESPIRATORY (INHALATION) at 11:03

## 2019-01-01 RX ADMIN — CLOPIDOGREL BISULFATE 300 MG: 75 TABLET ORAL at 02:06

## 2019-01-01 RX ADMIN — INSULIN ASPART 4 UNITS: 100 INJECTION, SOLUTION INTRAVENOUS; SUBCUTANEOUS at 12:06

## 2019-01-01 RX ADMIN — SODIUM CHLORIDE: 0.9 INJECTION, SOLUTION INTRAVENOUS at 11:06

## 2019-01-01 RX ADMIN — SODIUM BICARBONATE 50 MEQ: 84 INJECTION, SOLUTION INTRAVENOUS at 02:07

## 2019-01-01 RX ADMIN — PROPOFOL 5 MCG/KG/MIN: 10 INJECTION, EMULSION INTRAVENOUS at 07:06

## 2019-01-01 RX ADMIN — TIOTROPIUM BROMIDE 18 MCG: 18 CAPSULE ORAL; RESPIRATORY (INHALATION) at 11:03

## 2019-01-01 RX ADMIN — INSULIN ASPART 5 UNITS: 100 INJECTION, SOLUTION INTRAVENOUS; SUBCUTANEOUS at 06:06

## 2019-01-01 RX ADMIN — ASPIRIN 325 MG ORAL TABLET 325 MG: 325 PILL ORAL at 11:07

## 2019-01-01 RX ADMIN — FUROSEMIDE 40 MG: 10 INJECTION, SOLUTION INTRAVENOUS at 10:06

## 2019-01-01 RX ADMIN — INSULIN ASPART 4 UNITS: 100 INJECTION, SOLUTION INTRAVENOUS; SUBCUTANEOUS at 04:07

## 2019-01-01 RX ADMIN — TIOTROPIUM BROMIDE 18 MCG: 18 CAPSULE ORAL; RESPIRATORY (INHALATION) at 07:06

## 2019-01-01 RX ADMIN — CLOPIDOGREL BISULFATE 75 MG: 75 TABLET ORAL at 09:07

## 2019-01-01 RX ADMIN — FAMOTIDINE 20 MG: 10 INJECTION INTRAVENOUS at 08:02

## 2019-01-01 RX ADMIN — ENOXAPARIN SODIUM 40 MG: 100 INJECTION SUBCUTANEOUS at 04:06

## 2019-01-01 RX ADMIN — FAMOTIDINE 20 MG: 10 INJECTION INTRAVENOUS at 08:06

## 2019-01-01 RX ADMIN — INSULIN DETEMIR 10 UNITS: 100 INJECTION, SOLUTION SUBCUTANEOUS at 09:07

## 2019-01-01 RX ADMIN — ALBUTEROL SULFATE 12.5 MG: 2.5 SOLUTION RESPIRATORY (INHALATION) at 09:02

## 2019-01-01 RX ADMIN — IMATINIB MESYLATE 400 MG: 100 TABLET, FILM COATED ORAL at 08:06

## 2019-01-01 RX ADMIN — CLOPIDOGREL BISULFATE 75 MG: 75 TABLET ORAL at 08:07

## 2019-01-01 RX ADMIN — IPRATROPIUM BROMIDE AND ALBUTEROL SULFATE 3 ML: .5; 3 SOLUTION RESPIRATORY (INHALATION) at 04:06

## 2019-01-01 RX ADMIN — IPRATROPIUM BROMIDE AND ALBUTEROL SULFATE 3 ML: .5; 3 SOLUTION RESPIRATORY (INHALATION) at 08:06

## 2019-01-01 RX ADMIN — AMLODIPINE BESYLATE 10 MG: 5 TABLET ORAL at 09:03

## 2019-01-01 RX ADMIN — TIOTROPIUM BROMIDE 18 MCG: 18 CAPSULE ORAL; RESPIRATORY (INHALATION) at 08:03

## 2019-01-01 RX ADMIN — IPRATROPIUM BROMIDE AND ALBUTEROL SULFATE 3 ML: .5; 3 SOLUTION RESPIRATORY (INHALATION) at 10:03

## 2019-01-01 RX ADMIN — IPRATROPIUM BROMIDE AND ALBUTEROL SULFATE 3 ML: .5; 3 SOLUTION RESPIRATORY (INHALATION) at 12:07

## 2019-01-01 RX ADMIN — FUROSEMIDE 40 MG: 10 INJECTION, SOLUTION INTRAVENOUS at 01:03

## 2019-01-01 RX ADMIN — IPRATROPIUM BROMIDE AND ALBUTEROL SULFATE 3 ML: .5; 3 SOLUTION RESPIRATORY (INHALATION) at 02:03

## 2019-01-01 RX ADMIN — PIPERACILLIN AND TAZOBACTAM 4.5 G: 4; .5 INJECTION, POWDER, LYOPHILIZED, FOR SOLUTION INTRAVENOUS; PARENTERAL at 02:03

## 2019-01-01 RX ADMIN — PROPOFOL 25 MCG/KG/MIN: 10 INJECTION, EMULSION INTRAVENOUS at 06:06

## 2019-01-01 RX ADMIN — IPRATROPIUM BROMIDE AND ALBUTEROL SULFATE 3 ML: .5; 3 SOLUTION RESPIRATORY (INHALATION) at 03:06

## 2019-01-01 RX ADMIN — SUCCINYLCHOLINE CHLORIDE 20 MG: 20 INJECTION, SOLUTION INTRAMUSCULAR; INTRAVENOUS at 04:06

## 2019-01-01 RX ADMIN — HYDROXYZINE HYDROCHLORIDE 25 MG: 25 TABLET, FILM COATED ORAL at 05:06

## 2019-01-01 RX ADMIN — RANOLAZINE 500 MG: 500 TABLET, FILM COATED, EXTENDED RELEASE ORAL at 09:06

## 2019-01-01 RX ADMIN — RANOLAZINE 500 MG: 500 TABLET, FILM COATED, EXTENDED RELEASE ORAL at 09:03

## 2019-01-01 RX ADMIN — INSULIN ASPART 1 UNITS: 100 INJECTION, SOLUTION INTRAVENOUS; SUBCUTANEOUS at 11:06

## 2019-01-01 RX ADMIN — FAMOTIDINE 20 MG: 20 TABLET ORAL at 09:07

## 2019-01-01 RX ADMIN — ATORVASTATIN CALCIUM 80 MG: 40 TABLET, FILM COATED ORAL at 09:06

## 2019-01-01 RX ADMIN — ONDANSETRON 4 MG: 2 INJECTION INTRAMUSCULAR; INTRAVENOUS at 02:06

## 2019-01-01 RX ADMIN — TIOTROPIUM BROMIDE 18 MCG: 18 CAPSULE ORAL; RESPIRATORY (INHALATION) at 01:03

## 2019-01-01 RX ADMIN — FLUTICASONE PROPIONATE 100 MCG: 50 SPRAY, METERED NASAL at 09:06

## 2019-01-01 RX ADMIN — INSULIN ASPART 8 UNITS: 100 INJECTION, SOLUTION INTRAVENOUS; SUBCUTANEOUS at 06:02

## 2019-01-01 RX ADMIN — DOCUSATE SODIUM AND SENNOSIDES 1 TABLET: 8.6; 5 TABLET, FILM COATED ORAL at 09:07

## 2019-01-01 RX ADMIN — FUROSEMIDE 40 MG: 10 INJECTION, SOLUTION INTRAVENOUS at 06:06

## 2019-01-01 RX ADMIN — FAMOTIDINE 20 MG: 10 INJECTION INTRAVENOUS at 02:06

## 2019-01-01 RX ADMIN — IPRATROPIUM BROMIDE 0.5 MG: 0.5 SOLUTION RESPIRATORY (INHALATION) at 09:02

## 2019-01-01 RX ADMIN — LEVETIRACETAM 1000 MG: 500 TABLET, FILM COATED ORAL at 08:03

## 2019-01-01 RX ADMIN — FUROSEMIDE 40 MG: 40 TABLET ORAL at 09:06

## 2019-01-01 RX ADMIN — FUROSEMIDE 40 MG: 10 INJECTION, SOLUTION INTRAMUSCULAR; INTRAVENOUS at 11:05

## 2019-01-01 RX ADMIN — CLOPIDOGREL BISULFATE 75 MG: 75 TABLET ORAL at 09:03

## 2019-01-01 RX ADMIN — FUROSEMIDE 80 MG: 10 INJECTION, SOLUTION INTRAVENOUS at 01:07

## 2019-01-01 RX ADMIN — PREDNISONE 60 MG: 20 TABLET ORAL at 09:03

## 2019-01-01 RX ADMIN — PIPERACILLIN AND TAZOBACTAM 4.5 G: 4; .5 INJECTION, POWDER, LYOPHILIZED, FOR SOLUTION INTRAVENOUS; PARENTERAL at 05:03

## 2019-01-01 RX ADMIN — ACETAMINOPHEN 650 MG: 325 TABLET, FILM COATED ORAL at 04:06

## 2019-01-01 RX ADMIN — CEFTRIAXONE 1 G: 1 INJECTION, SOLUTION INTRAVENOUS at 12:03

## 2019-01-01 RX ADMIN — ENOXAPARIN SODIUM 60 MG: 100 INJECTION SUBCUTANEOUS at 02:06

## 2019-01-01 RX ADMIN — CLOPIDOGREL BISULFATE 75 MG: 75 TABLET ORAL at 11:07

## 2019-01-01 RX ADMIN — ASPIRIN 81 MG: 81 TABLET, COATED ORAL at 08:06

## 2019-01-01 RX ADMIN — ASPIRIN 81 MG 81 MG: 81 TABLET ORAL at 08:07

## 2019-01-01 RX ADMIN — FUROSEMIDE 20 MG: 10 INJECTION, SOLUTION INTRAMUSCULAR; INTRAVENOUS at 08:02

## 2019-01-01 RX ADMIN — METHYLPREDNISOLONE SODIUM SUCCINATE 80 MG: 40 INJECTION, POWDER, FOR SOLUTION INTRAMUSCULAR; INTRAVENOUS at 05:06

## 2019-01-01 RX ADMIN — IPRATROPIUM BROMIDE AND ALBUTEROL SULFATE 3 ML: .5; 3 SOLUTION RESPIRATORY (INHALATION) at 08:07

## 2019-01-01 RX ADMIN — LISINOPRIL 40 MG: 20 TABLET ORAL at 09:07

## 2019-01-01 RX ADMIN — FUROSEMIDE 20 MG: 10 INJECTION, SOLUTION INTRAMUSCULAR; INTRAVENOUS at 10:02

## 2019-01-01 RX ADMIN — AMLODIPINE BESYLATE 10 MG: 5 TABLET ORAL at 09:06

## 2019-01-01 RX ADMIN — ALBUTEROL SULFATE 10 MG: 2.5 SOLUTION RESPIRATORY (INHALATION) at 04:06

## 2019-01-01 RX ADMIN — INSULIN ASPART 2 UNITS: 100 INJECTION, SOLUTION INTRAVENOUS; SUBCUTANEOUS at 09:06

## 2019-01-01 RX ADMIN — ISOSORBIDE MONONITRATE 30 MG: 30 TABLET, EXTENDED RELEASE ORAL at 09:06

## 2019-01-01 RX ADMIN — FUROSEMIDE 40 MG: 10 INJECTION, SOLUTION INTRAVENOUS at 05:03

## 2019-01-01 RX ADMIN — ASPIRIN 81 MG 81 MG: 81 TABLET ORAL at 11:07

## 2019-01-01 RX ADMIN — ATORVASTATIN CALCIUM 80 MG: 40 TABLET, FILM COATED ORAL at 09:03

## 2019-01-01 RX ADMIN — FUROSEMIDE 40 MG: 40 TABLET ORAL at 09:07

## 2019-01-01 RX ADMIN — METHYLPREDNISOLONE SODIUM SUCCINATE 125 MG: 125 INJECTION, POWDER, FOR SOLUTION INTRAMUSCULAR; INTRAVENOUS at 10:05

## 2019-01-01 RX ADMIN — INSULIN ASPART 2 UNITS: 100 INJECTION, SOLUTION INTRAVENOUS; SUBCUTANEOUS at 04:06

## 2019-01-01 RX ADMIN — IPRATROPIUM BROMIDE AND ALBUTEROL SULFATE 3 ML: .5; 3 SOLUTION RESPIRATORY (INHALATION) at 03:07

## 2019-01-01 RX ADMIN — METHYLPREDNISOLONE SODIUM SUCCINATE 125 MG: 125 INJECTION, POWDER, FOR SOLUTION INTRAMUSCULAR; INTRAVENOUS at 04:06

## 2019-01-01 RX ADMIN — DIPHENHYDRAMINE HYDROCHLORIDE 25 MG: 50 INJECTION INTRAMUSCULAR; INTRAVENOUS at 09:06

## 2019-01-01 RX ADMIN — ETOMIDATE 10 MG: 20 INJECTION, SOLUTION INTRAVENOUS at 04:06

## 2019-01-01 RX ADMIN — METHYLPREDNISOLONE SODIUM SUCCINATE 80 MG: 40 INJECTION, POWDER, FOR SOLUTION INTRAMUSCULAR; INTRAVENOUS at 11:06

## 2019-01-01 RX ADMIN — CEFTRIAXONE 1 G: 1 INJECTION, SOLUTION INTRAVENOUS at 09:02

## 2019-01-01 RX ADMIN — METOPROLOL TARTRATE 25 MG: 25 TABLET ORAL at 09:06

## 2019-01-01 RX ADMIN — AMLODIPINE BESYLATE 10 MG: 5 TABLET ORAL at 05:03

## 2019-01-01 RX ADMIN — PREDNISONE 40 MG: 20 TABLET ORAL at 09:03

## 2019-01-01 RX ADMIN — IMATINIB MESYLATE 400 MG: 100 TABLET, FILM COATED ORAL at 09:06

## 2019-01-01 RX ADMIN — INSULIN DETEMIR 12 UNITS: 100 INJECTION, SOLUTION SUBCUTANEOUS at 08:02

## 2019-01-01 RX ADMIN — CLOPIDOGREL BISULFATE 75 MG: 75 TABLET ORAL at 12:06

## 2019-01-01 RX ADMIN — DEXMEDETOMIDINE HYDROCHLORIDE 1.4 MCG/KG/HR: 4 INJECTION INTRAVENOUS at 07:06

## 2019-01-01 RX ADMIN — LISINOPRIL 40 MG: 20 TABLET ORAL at 09:03

## 2019-01-01 RX ADMIN — FLUTICASONE PROPIONATE 100 MCG: 50 SPRAY, METERED NASAL at 10:06

## 2019-01-01 RX ADMIN — INSULIN DETEMIR 10 UNITS: 100 INJECTION, SOLUTION SUBCUTANEOUS at 08:07

## 2019-01-01 RX ADMIN — ENOXAPARIN SODIUM 60 MG: 100 INJECTION SUBCUTANEOUS at 10:06

## 2019-01-01 RX ADMIN — IPRATROPIUM BROMIDE AND ALBUTEROL SULFATE 3 ML: .5; 3 SOLUTION RESPIRATORY (INHALATION) at 03:02

## 2019-01-01 RX ADMIN — LISINOPRIL 40 MG: 20 TABLET ORAL at 09:06

## 2019-01-01 RX ADMIN — LORAZEPAM 0.5 MG: 2 INJECTION INTRAMUSCULAR; INTRAVENOUS at 02:06

## 2019-01-01 RX ADMIN — IPRATROPIUM BROMIDE AND ALBUTEROL SULFATE 3 ML: .5; 3 SOLUTION RESPIRATORY (INHALATION) at 03:03

## 2019-01-01 RX ADMIN — INSULIN ASPART 3 UNITS: 100 INJECTION, SOLUTION INTRAVENOUS; SUBCUTANEOUS at 06:06

## 2019-01-01 RX ADMIN — NITROGLYCERIN 20 MCG/MIN: 20 INJECTION INTRAVENOUS at 04:06

## 2019-01-01 RX ADMIN — NITROGLYCERIN 0.4 MG: 0.4 TABLET SUBLINGUAL at 02:03

## 2019-01-01 RX ADMIN — IPRATROPIUM BROMIDE AND ALBUTEROL SULFATE 3 ML: .5; 3 SOLUTION RESPIRATORY (INHALATION) at 01:07

## 2019-01-01 RX ADMIN — ASPIRIN 81 MG: 81 TABLET, COATED ORAL at 09:07

## 2019-01-01 RX ADMIN — LORAZEPAM 0.5 MG: 2 INJECTION INTRAMUSCULAR; INTRAVENOUS at 03:06

## 2019-01-01 RX ADMIN — ALBUTEROL SULFATE 2.5 MG: 2.5 SOLUTION RESPIRATORY (INHALATION) at 12:05

## 2019-01-01 RX ADMIN — LIDOCAINE HYDROCHLORIDE: 20 SOLUTION ORAL; TOPICAL at 02:06

## 2019-01-01 RX ADMIN — FAMOTIDINE 20 MG: 10 INJECTION INTRAVENOUS at 09:02

## 2019-01-01 RX ADMIN — DEXMEDETOMIDINE HYDROCHLORIDE 0.2 MCG/KG/HR: 4 INJECTION INTRAVENOUS at 12:06

## 2019-01-01 RX ADMIN — ISOSORBIDE MONONITRATE 30 MG: 30 TABLET, EXTENDED RELEASE ORAL at 08:06

## 2019-01-01 RX ADMIN — INSULIN ASPART 4 UNITS: 100 INJECTION, SOLUTION INTRAVENOUS; SUBCUTANEOUS at 08:02

## 2019-01-01 RX ADMIN — ENOXAPARIN SODIUM 60 MG: 100 INJECTION SUBCUTANEOUS at 09:06

## 2019-01-01 RX ADMIN — CLOPIDOGREL BISULFATE 600 MG: 300 TABLET, FILM COATED ORAL at 09:06

## 2019-01-01 RX ADMIN — ATORVASTATIN CALCIUM 80 MG: 40 TABLET, FILM COATED ORAL at 05:03

## 2019-01-01 RX ADMIN — FAMOTIDINE 20 MG: 10 INJECTION INTRAVENOUS at 10:06

## 2019-01-01 RX ADMIN — MAGNESIUM SULFATE 2 G: 500 INJECTION, SOLUTION INTRAMUSCULAR; INTRAVENOUS at 02:07

## 2019-01-01 RX ADMIN — LISINOPRIL 40 MG: 20 TABLET ORAL at 05:03

## 2019-01-01 RX ADMIN — INSULIN ASPART 2 UNITS: 100 INJECTION, SOLUTION INTRAVENOUS; SUBCUTANEOUS at 08:07

## 2019-01-01 RX ADMIN — AMIODARONE HYDROCHLORIDE 300 MG: 50 INJECTION, SOLUTION INTRAVENOUS at 02:07

## 2019-01-01 RX ADMIN — FLUTICASONE FUROATE AND VILANTEROL TRIFENATATE 1 PUFF: 100; 25 POWDER RESPIRATORY (INHALATION) at 08:03

## 2019-01-01 RX ADMIN — METOPROLOL TARTRATE 25 MG: 25 TABLET ORAL at 11:07

## 2019-01-01 RX ADMIN — INSULIN ASPART 4 UNITS: 100 INJECTION, SOLUTION INTRAVENOUS; SUBCUTANEOUS at 09:03

## 2019-01-01 RX ADMIN — PREDNISONE 60 MG: 20 TABLET ORAL at 10:03

## 2019-01-01 RX ADMIN — SUCCINYLCHOLINE CHLORIDE 20 MG: 20 INJECTION INTRAMUSCULAR; INTRAVENOUS at 04:06

## 2019-02-04 PROBLEM — J96.01 ACUTE RESPIRATORY FAILURE WITH HYPOXIA: Status: ACTIVE | Noted: 2019-01-01

## 2019-02-04 PROBLEM — I50.23 ACUTE ON CHRONIC SYSTOLIC CONGESTIVE HEART FAILURE: Status: ACTIVE | Noted: 2019-01-01

## 2019-02-04 PROBLEM — I50.43 ACUTE ON CHRONIC COMBINED SYSTOLIC AND DIASTOLIC CONGESTIVE HEART FAILURE: Status: ACTIVE | Noted: 2019-01-01

## 2019-02-04 PROBLEM — I10 ESSENTIAL HYPERTENSION: Status: ACTIVE | Noted: 2019-01-01

## 2019-02-04 NOTE — PROGRESS NOTES
RT tried obtaining ABG; Pt refused pulled her arm and began yelling refusal to participate; RN informed

## 2019-02-04 NOTE — ED TRIAGE NOTES
"Pt arrived to ED with  C/o SOB. Per EMS pt was in respiratory distress upon arrival, ems placed pt on cpap. MD at bedside. Resp at bedside, Bipap per MD. Pt reports, "feeling better" Pt connected to continuous cardiac monitor, bp cuff, and pulse ox.   "

## 2019-02-04 NOTE — ASSESSMENT & PLAN NOTE
She say,she was non complaint with diet and lasix,last echo in 3/2018 show EF of 40%,will continue with IV lasix,repeat Echo.

## 2019-02-04 NOTE — HPI
75 y/o F who has a past medical history of Asthma, Cancer (10/10/2014), COPD (chronic obstructive pulmonary disease), Diabetes mellitus, Diabetes mellitus type II, Emphysema of lung, Hypercholesteremia, Hypertension, Myocardial infarct (12/25/2013), Palpitations, PVD (peripheral vascular disease), Seizures, and Stroke presents to the ED via EMS with shortness of breath. Pt symptoms began upon the pt waking up this morning. Via EMS pt received duo neb and 125 mg solumedrol in route. Pt was 88% on RA upon EMS arrival. Pt is on home O2 as needed. She was not on O2 upon EMS arrival. Pt is normally interacting, engaging, and talkative. No chest pain. Pt denies any other symptoms.she was at arrival was in severe respiratory distress,was started on bipap,recieved nebular with IV lasix with much improvement,respiratory status is improved,was able weaned to NC O 2,she has leucocytosis with definitive sign of infection,has been  Started on empiric IV Abx.

## 2019-02-04 NOTE — PROGRESS NOTES
Arrived on unit in bed awake and alert. Patient SOB with little position changes.Placed on 3L/ educated on patient safely. Bed low call light in  reach assessment completed.

## 2019-02-04 NOTE — SUBJECTIVE & OBJECTIVE
"Past Medical History:   Diagnosis Date    Asthma     Cancer 10/10/2014    Bone cancer     COPD (chronic obstructive pulmonary disease)     Diabetes mellitus     Diabetes mellitus type II     Emphysema of lung     Hypercholesteremia     Hypertension     Myocardial infarct 2013    Palpitations     PVD (peripheral vascular disease)     Seizures     Stroke        Past Surgical History:   Procedure Laterality Date    APPENDECTOMY       SECTION      CHOLECYSTECTOMY      HYSTERECTOMY      VASCULAR SURGERY      stent in L leg; unable to place in R leg d/t blockage       Review of patient's allergies indicates:   Allergen Reactions    Morphine      Pt states, "I'm not allergic to morphine they gave me too much at [facility]. Instead of giving me 2mg she gave me 5mg and I was almost dead."     Codeine      Blisters vs sweling (pt unsure which one)    Iodine containing multivitamin      Blisters vs swelling (pt unsure which one.)    Lidocaine        No current facility-administered medications on file prior to encounter.      Current Outpatient Medications on File Prior to Encounter   Medication Sig    ADVAIR DISKUS 100-50 mcg/dose diskus inhaler Take 1 puff by mouth Twice daily.    amlodipine (NORVASC) 10 MG tablet Take 1 tablet (10 mg total) by mouth once daily.    ANORO ELLIPTA 62.5-25 mcg/actuation DsDv     aspirin (ECOTRIN) 81 MG EC tablet Take 81 mg by mouth once daily.      atorvastatin (LIPITOR) 80 MG tablet TK 1 T PO D    BD ULTRA-FINE ANISHA PEN NEEDLE 32 gauge x " Ndle use for insulin up to FOUR TIMES DAILY    BIDIL 20-37.5 mg Tab TK 1 T PO TID    calcium-vitamin D (CALCIUM-VITAMIN D) 500 mg(1,250mg) -200 unit per tablet Take 1 tablet by mouth 2 (two) times daily with meals.      cloNIDine (CATAPRES) 0.1 MG tablet Take 1 tablet (0.1 mg total) by mouth 2 (two) times daily.    clopidogrel (PLAVIX) 75 mg tablet TK 1 T PO D    CRESTOR 20 mg tablet Take 10 mg by mouth every " evening.     ergocalciferol (ERGOCALCIFEROL) 50,000 unit Cap Take 50,000 Units by mouth every 7 days.      famotidine (PEPCID) 40 MG tablet     furosemide (LASIX) 40 MG tablet Take 1 tablet (40 mg total) by mouth once daily.    gabapentin (NEURONTIN) 300 MG capsule     hydrocodone-acetaminophen 5-325mg (NORCO) 5-325 mg per tablet Take 1 tablet by mouth 3 (three) times daily as needed.     imatinib (GLEEVEC) 400 MG Tab Take 1 tablet (400 mg total) by mouth once daily.    insulin glargine (LANTUS) 100 unit/mL injection Inject 25 Units into the skin every evening.    isosorbide mononitrate (IMDUR) 30 MG 24 hr tablet Take 1 tablet (30 mg total) by mouth once daily.    levetiracetam (KEPPRA) 500 MG Tab Take 2 tablets by mouth Twice daily.    levoFLOXacin (LEVAQUIN) 750 MG tablet     lisinopril (PRINIVIL,ZESTRIL) 40 MG tablet Take 1 tablet (40 mg total) by mouth once daily.    metoprolol tartrate (LOPRESSOR) 25 MG tablet TK 1 T PO BID    nitroGLYCERIN (NITROSTAT) 0.4 MG SL tablet Place 1 tablet (0.4 mg total) under the tongue every 5 (five) minutes as needed for Chest pain.    prednisoLONE acetate (PRED FORTE) 1 % DrpS     predniSONE (DELTASONE) 20 MG tablet     ranitidine (ZANTAC) 300 MG tablet TK 1 T PO QHS    ranolazine (RANEXA) 500 MG Tb12 Take 500 mg by mouth 2 (two) times daily.    tiotropium (SPIRIVA) 18 mcg inhalation capsule Inhale 18 mcg into the lungs once daily.      VENTOLIN HFA 90 mcg/actuation HFAA Inhale 2 puffs into the lungs every 4 to 6 hours as needed.     Family History     Problem Relation (Age of Onset)    Asthma Mother    Breast cancer Sister    Cancer Father    Diabetes Mother        Tobacco Use    Smoking status: Current Some Day Smoker     Packs/day: 0.50     Years: 56.00     Pack years: 28.00     Types: Cigarettes    Smokeless tobacco: Never Used   Substance and Sexual Activity    Alcohol use: No    Drug use: No    Sexual activity: No     Review of Systems    Constitutional: Negative for activity change and appetite change.   HENT: Negative for congestion and dental problem.    Eyes: Negative for discharge and itching.   Respiratory: Positive for cough and shortness of breath.    Cardiovascular: Negative for chest pain and leg swelling.   Gastrointestinal: Negative for abdominal distention and abdominal pain.   Endocrine: Negative for cold intolerance and heat intolerance.   Genitourinary: Negative for difficulty urinating.   Musculoskeletal: Negative for arthralgias and back pain.   Skin: Negative for pallor.   Allergic/Immunologic: Negative for environmental allergies and food allergies.   Neurological: Negative for dizziness.   Hematological: Negative for adenopathy. Does not bruise/bleed easily.   Psychiatric/Behavioral: Negative for agitation and confusion.     Objective:     Vital Signs (Most Recent):  Temp: 97.8 °F (36.6 °C) (02/04/19 1013)  Pulse: 78 (02/04/19 1010)  Resp: 19 (02/04/19 1010)  BP: (!) 152/72 (02/04/19 1002)  SpO2: 100 % (02/04/19 1010) Vital Signs (24h Range):  Temp:  [97.7 °F (36.5 °C)-97.8 °F (36.6 °C)] 97.8 °F (36.6 °C)  Pulse:  [78-99] 78  Resp:  [19-24] 19  SpO2:  [99 %-100 %] 100 %  BP: (152-160)/(71-85) 152/72     Weight: 63.5 kg (140 lb)  Body mass index is 24.03 kg/m².    Physical Exam   Constitutional: She is oriented to person, place, and time. No distress.   HENT:   Head: Atraumatic.   Eyes: EOM are normal. Pupils are equal, round, and reactive to light.   Neck: Normal range of motion. Neck supple.   Cardiovascular: Normal rate.   Pulmonary/Chest:   Diminished basal breath sound.   Abdominal: Soft. Bowel sounds are normal. She exhibits no distension. There is no tenderness.   Musculoskeletal: Normal range of motion. She exhibits no edema or deformity.   Neurological: She is oriented to person, place, and time. No cranial nerve deficit. Coordination normal.   Skin: Skin is warm and dry.   Psychiatric: She has a normal mood and affect.  Her behavior is normal.         CRANIAL NERVES     CN III, IV, VI   Pupils are equal, round, and reactive to light.  Extraocular motions are normal.        Significant Labs:   BMP:   Recent Labs   Lab 02/04/19  0834   *      K 3.7      CO2 27   BUN 11   CREATININE 0.8   CALCIUM 8.4*   MG 2.0     CBC:   Recent Labs   Lab 02/04/19  0834   WBC 32.36*   HGB 11.2*   HCT 35.7*   *     Troponin:   Recent Labs   Lab 02/04/19  0834   TROPONINI 0.061*       Significant Imaging: reviewed.

## 2019-02-04 NOTE — H&P
"[facility] Medicine  History & Physical    Patient Name: Susan Howell  MRN: [id number]  Admission Date: 2/4/2019  Attending Physician: Faraz Harris    Primary Care Provider: Linda Mckee MD         Patient information was obtained from patient and ER records.     Subjective:     Principal Problem:Acute on chronic combined systolic and diastolic congestive heart failure    Chief Complaint:   Chief Complaint   Patient presents with    Shortness of Breath     + SOB x "this morning", HX of CHF. Received ONE duo neb and ONE ALBUTEROL TX  mg solumedrol in route, placed on CPap. 88% on RA upon EMS arrival.         HPI: 75 y/o F who has a past medical history of Asthma, Cancer (10/10/2014), COPD (chronic obstructive pulmonary disease), Diabetes mellitus, Diabetes mellitus type II, Emphysema of lung, Hypercholesteremia, Hypertension, Myocardial infarct (12/25/2013), Palpitations, PVD (peripheral vascular disease), Seizures, and Stroke presents to the ED via EMS with shortness of breath. Pt symptoms began upon the pt waking up this morning. Via EMS pt received duo neb and 125 mg solumedrol in route. Pt was 88% on RA upon EMS arrival. Pt is on home O2 as needed. She was not on O2 upon EMS arrival. Pt is normally interacting, engaging, and talkative. No chest pain. Pt denies any other symptoms.she was at arrival was in severe respiratory distress,was started on bipap,recieved nebular with IV lasix with much improvement,respiratory status is improved,was able weaned to NC O 2,she has leucocytosis with definitive sign of infection,has been  Started on empiric IV Abx.        Past Medical History:   Diagnosis Date    Asthma     Cancer 10/10/2014    Bone cancer     COPD (chronic obstructive pulmonary disease)     Diabetes mellitus     Diabetes mellitus type II     Emphysema of lung     Hypercholesteremia     Hypertension     Myocardial infarct 12/25/2013    Palpitations     " "PVD (peripheral vascular disease)     Seizures     Stroke        Past Surgical History:   Procedure Laterality Date    APPENDECTOMY       SECTION      CHOLECYSTECTOMY      HYSTERECTOMY      VASCULAR SURGERY      stent in L leg; unable to place in R leg d/t blockage       Review of patient's allergies indicates:   Allergen Reactions    Morphine      Pt states, "I'm not allergic to morphine they gave me too much at [facility]. Instead of giving me 2mg she gave me 5mg and I was almost dead."     Codeine      Blisters vs sweling (pt unsure which one)    Iodine containing multivitamin      Blisters vs swelling (pt unsure which one.)    Lidocaine        No current facility-administered medications on file prior to encounter.      Current Outpatient Medications on File Prior to Encounter   Medication Sig    ADVAIR DISKUS 100-50 mcg/dose diskus inhaler Take 1 puff by mouth Twice daily.    amlodipine (NORVASC) 10 MG tablet Take 1 tablet (10 mg total) by mouth once daily.    ANORO ELLIPTA 62.5-25 mcg/actuation DsDv     aspirin (ECOTRIN) 81 MG EC tablet Take 81 mg by mouth once daily.      atorvastatin (LIPITOR) 80 MG tablet TK 1 T PO D    BD ULTRA-FINE ANISHA PEN NEEDLE 32 gauge x 5/32" Ndle use for insulin up to FOUR TIMES DAILY    BIDIL 20-37.5 mg Tab TK 1 T PO TID    calcium-vitamin D (CALCIUM-VITAMIN D) 500 mg(1,250mg) -200 unit per tablet Take 1 tablet by mouth 2 (two) times daily with meals.      cloNIDine (CATAPRES) 0.1 MG tablet Take 1 tablet (0.1 mg total) by mouth 2 (two) times daily.    clopidogrel (PLAVIX) 75 mg tablet TK 1 T PO D    CRESTOR 20 mg tablet Take 10 mg by mouth every evening.     ergocalciferol (ERGOCALCIFEROL) 50,000 unit Cap Take 50,000 Units by mouth every 7 days.      famotidine (PEPCID) 40 MG tablet     furosemide (LASIX) 40 MG tablet Take 1 tablet (40 mg total) by mouth once daily.    gabapentin (NEURONTIN) 300 MG capsule     hydrocodone-acetaminophen 5-325mg " (NORCO) 5-325 mg per tablet Take 1 tablet by mouth 3 (three) times daily as needed.     imatinib (GLEEVEC) 400 MG Tab Take 1 tablet (400 mg total) by mouth once daily.    insulin glargine (LANTUS) 100 unit/mL injection Inject 25 Units into the skin every evening.    isosorbide mononitrate (IMDUR) 30 MG 24 hr tablet Take 1 tablet (30 mg total) by mouth once daily.    levetiracetam (KEPPRA) 500 MG Tab Take 2 tablets by mouth Twice daily.    levoFLOXacin (LEVAQUIN) 750 MG tablet     lisinopril (PRINIVIL,ZESTRIL) 40 MG tablet Take 1 tablet (40 mg total) by mouth once daily.    metoprolol tartrate (LOPRESSOR) 25 MG tablet TK 1 T PO BID    nitroGLYCERIN (NITROSTAT) 0.4 MG SL tablet Place 1 tablet (0.4 mg total) under the tongue every 5 (five) minutes as needed for Chest pain.    prednisoLONE acetate (PRED FORTE) 1 % DrpS     predniSONE (DELTASONE) 20 MG tablet     ranitidine (ZANTAC) 300 MG tablet TK 1 T PO QHS    ranolazine (RANEXA) 500 MG Tb12 Take 500 mg by mouth 2 (two) times daily.    tiotropium (SPIRIVA) 18 mcg inhalation capsule Inhale 18 mcg into the lungs once daily.      VENTOLIN HFA 90 mcg/actuation HFAA Inhale 2 puffs into the lungs every 4 to 6 hours as needed.     Family History     Problem Relation (Age of Onset)    Asthma Mother    Breast cancer Sister    Cancer Father    Diabetes Mother        Tobacco Use    Smoking status: Current Some Day Smoker     Packs/day: 0.50     Years: 56.00     Pack years: 28.00     Types: Cigarettes    Smokeless tobacco: Never Used   Substance and Sexual Activity    Alcohol use: No    Drug use: No    Sexual activity: No     Review of Systems   Constitutional: Negative for activity change and appetite change.   HENT: Negative for congestion and dental problem.    Eyes: Negative for discharge and itching.   Respiratory: Positive for cough and shortness of breath.    Cardiovascular: Negative for chest pain and leg swelling.   Gastrointestinal: Negative for  abdominal distention and abdominal pain.   Endocrine: Negative for cold intolerance and heat intolerance.   Genitourinary: Negative for difficulty urinating.   Musculoskeletal: Negative for arthralgias and back pain.   Skin: Negative for pallor.   Allergic/Immunologic: Negative for environmental allergies and food allergies.   Neurological: Negative for dizziness.   Hematological: Negative for adenopathy. Does not bruise/bleed easily.   Psychiatric/Behavioral: Negative for agitation and confusion.     Objective:     Vital Signs (Most Recent):  Temp: 97.8 °F (36.6 °C) (02/04/19 1013)  Pulse: 78 (02/04/19 1010)  Resp: 19 (02/04/19 1010)  BP: (!) 152/72 (02/04/19 1002)  SpO2: 100 % (02/04/19 1010) Vital Signs (24h Range):  Temp:  [97.7 °F (36.5 °C)-97.8 °F (36.6 °C)] 97.8 °F (36.6 °C)  Pulse:  [78-99] 78  Resp:  [19-24] 19  SpO2:  [99 %-100 %] 100 %  BP: (152-160)/(71-85) 152/72     Weight: 63.5 kg (140 lb)  Body mass index is 24.03 kg/m².    Physical Exam   Constitutional: She is oriented to person, place, and time. No distress.   HENT:   Head: Atraumatic.   Eyes: EOM are normal. Pupils are equal, round, and reactive to light.   Neck: Normal range of motion. Neck supple.   Cardiovascular: Normal rate.   Pulmonary/Chest:   Diminished basal breath sound.   Abdominal: Soft. Bowel sounds are normal. She exhibits no distension. There is no tenderness.   Musculoskeletal: Normal range of motion. She exhibits no edema or deformity.   Neurological: She is oriented to person, place, and time. No cranial nerve deficit. Coordination normal.   Skin: Skin is warm and dry.   Psychiatric: She has a normal mood and affect. Her behavior is normal.         CRANIAL NERVES     CN III, IV, VI   Pupils are equal, round, and reactive to light.  Extraocular motions are normal.        Significant Labs:   BMP:   Recent Labs   Lab 02/04/19  0834   *      K 3.7      CO2 27   BUN 11   CREATININE 0.8   CALCIUM 8.4*   MG 2.0      CBC:   Recent Labs   Lab 02/04/19  0834   WBC 32.36*   HGB 11.2*   HCT 35.7*   *     Troponin:   Recent Labs   Lab 02/04/19  0834   TROPONINI 0.061*       Significant Imaging: reviewed.    Assessment/Plan:     * Acute on chronic combined systolic and diastolic congestive heart failure    She say,she was non complaint with diet and lasix,last echo in 3/2018 show EF of 40%,will continue with IV lasix,repeat Echo.       Acute respiratory failure with hypoxia    likely duo to CHF exacerbation,improving with IV lasix,off bipap and stable on NC O 2,has home oxygen.       Essential hypertension      Stable on home medications.     CML (chronic myelocytic leukemia)    Followed by [facility],possible reason for leucocytosis.       Anemia of chronic disease    Stable,will monitored.       Hyperlipidemia    On statin.       Type 2 diabetes mellitus, uncontrolled    On basal and  SSI.       Elevated troponin    Chronic, elevated,on baseline,will trend,denies chest pain.       Tobacco abuse    Still smoked,consuylted over 5 minutes need quit smoking.       PVD (peripheral vascular disease)    On ASA,statin.       COPD (chronic obstructive pulmonary disease)    On nebulizer,has home nebulizer and oxygen.         VTE Risk Mitigation (From admission, onward)    None             Faraz Harris MD  Department of Hospital Medicine   [facility]

## 2019-02-04 NOTE — ED PROVIDER NOTES
"Encounter Date: 2/4/2019    SCRIBE #1 NOTE: I, Nancy Villarreal, am scribing for, and in the presence of,  John Paris MD . I have scribed the following portions of the note - Other sections scribed: HPI, ROS .       History     Chief Complaint   Patient presents with    Shortness of Breath     + SOB x "this morning", HX of CHF. Received ONE duo neb and ONE ALBUTEROL TX  mg solumedrol in route, placed on CPap. 88% on RA upon EMS arrival.      CC: SOB    HPI: 75 y/o F who has a past medical history of Asthma, Cancer (10/10/2014), COPD (chronic obstructive pulmonary disease), Diabetes mellitus, Diabetes mellitus type II, Emphysema of lung, Hypercholesteremia, Hypertension, Myocardial infarct (12/25/2013), Palpitations, PVD (peripheral vascular disease), Seizures, and Stroke presents to the ED via EMS with shortness of breath. Pt symptoms began upon the pt waking up this morning. Via EMS pt received duo neb and 125 mg solumedrol in route. Pt was 88% on RA upon EMS arrival. Pt is on home O2 as needed. She was not on O2 upon EMS arrival. Pt is normally interacting, engaging, and talkative. No chest pain. Pt denies any other symptoms.          The history is provided by the EMS personnel. No  was used.     Review of patient's allergies indicates:   Allergen Reactions    Morphine      Pt states, "I'm not allergic to morphine they gave me too much at [facility]. Instead of giving me 2mg she gave me 5mg and I was almost dead."     Codeine      Blisters vs sweling (pt unsure which one)    Iodine containing multivitamin      Blisters vs swelling (pt unsure which one.)    Lidocaine      Past Medical History:   Diagnosis Date    Asthma     Cancer 10/10/2014    Bone cancer     CHF (congestive heart failure)     Cigarette smoker     COPD (chronic obstructive pulmonary disease)     Diabetes mellitus     Diabetes mellitus type II     Emphysema of lung     [place]ia (hard of hearing)     " Hypercholesteremia     Hypertension     Myocardial infarct 2013    On home oxygen therapy     Palpitations     PVD (peripheral vascular disease)     Seizures     Stroke     Unsteady gait     uses a cane & walker     Past Surgical History:   Procedure Laterality Date    APPENDECTOMY       SECTION      CHOLECYSTECTOMY      HYSTERECTOMY      VASCULAR SURGERY      stent in L leg; unable to place in R leg d/t blockage     Family History   Problem Relation Age of Onset    Diabetes Mother     Asthma Mother     Cancer Father     Breast cancer Sister     Colon cancer Neg Hx     Ovarian cancer Neg Hx      Social History     Tobacco Use    Smoking status: Current Every Day Smoker     Packs/day: 0.50     Years: 56.00     Pack years: 28.00     Types: Cigarettes    Smokeless tobacco: Never Used   Substance Use Topics    Alcohol use: No    Drug use: No     Review of Systems   Constitutional: Negative for appetite change and fever.   HENT: Negative for rhinorrhea and sore throat.    Eyes: Negative for visual disturbance.   Respiratory: Positive for shortness of breath. Negative for cough.    Cardiovascular: Negative for chest pain.   Gastrointestinal: Negative for abdominal pain.   Genitourinary: Negative for dysuria.   Musculoskeletal: Negative for gait problem.   Skin: Negative for rash.   Neurological: Negative for syncope.       Physical Exam     Initial Vitals   BP Pulse Resp Temp SpO2   19 0815 19 0812 19 0812 19 0840 19 0812   (!) 160/85 99 (!) 24 97.7 °F (36.5 °C) 99 %      MAP       --                Physical Exam    Nursing note and vitals reviewed.  Constitutional: She appears well-developed and well-nourished.   Eyes: EOM are normal. Pupils are equal, round, and reactive to light.   Neck: Normal range of motion. Neck supple. No thyromegaly present. No JVD present.   Cardiovascular: Normal rate, regular rhythm, normal heart sounds and intact distal  pulses. Exam reveals no gallop and no friction rub.    No murmur heard.  Pulmonary/Chest: She is in respiratory distress. She has wheezes. She has rhonchi. She has rales.   Abdominal: Soft. Bowel sounds are normal. There is no tenderness.   Musculoskeletal: Normal range of motion. She exhibits no edema or tenderness.   Neurological: She is alert and oriented to person, place, and time. She has normal strength.   Skin: Skin is warm and dry.         ED Course   Procedures  Labs Reviewed   CBC W/ AUTO DIFFERENTIAL - Abnormal; Notable for the following components:       Result Value    WBC 32.36 (*)     Hemoglobin 11.2 (*)     Hematocrit 35.7 (*)     MCV 76 (*)     MCH 23.9 (*)     MCHC 31.4 (*)     RDW 22.0 (*)     Platelets 569 (*)     Gran% 79.0 (*)     Lymph% 9.0 (*)     All other components within normal limits   COMPREHENSIVE METABOLIC PANEL - Abnormal; Notable for the following components:    Glucose 199 (*)     Calcium 8.4 (*)     All other components within normal limits   TROPONIN I - Abnormal; Notable for the following components:    Troponin I 0.061 (*)     All other components within normal limits   B-TYPE NATRIURETIC PEPTIDE - Abnormal; Notable for the following components:     (*)     All other components within normal limits   CULTURE, BLOOD   CULTURE, BLOOD   MAGNESIUM   LACTIC ACID, PLASMA   POCT GLUCOSE MONITORING CONTINUOUS        ECG Results          EKG 12-lead (In process)  Result time 02/04/19 16:40:30    In process by Interface, Lab In [facility] (02/04/19 16:40:30)                 Narrative:    Test Reason : R06.02,    Vent. Rate : 097 BPM     Atrial Rate : 097 BPM     P-R Int : 150 ms          QRS Dur : 086 ms      QT Int : 406 ms       P-R-T Axes : 084 088 -83 degrees     QTc Int : 515 ms    Normal sinus rhythm  Biatrial enlargement  LVH with repolarization abnormality  Prolonged QT  Abnormal ECG  When compared with ECG of 05-MAR-2016 11:35,  Significant changes have occurred    Referred  By: YAHAIRA   SELF           Confirmed By:                   In process by Interface, Lab In [facility] (02/04/19 16:25:51)                 Narrative:    Test Reason : R06.02,    Vent. Rate : 097 BPM     Atrial Rate : 097 BPM     P-R Int : 150 ms          QRS Dur : 086 ms      QT Int : 406 ms       P-R-T Axes : 084 088 -83 degrees     QTc Int : 515 ms    Normal sinus rhythm  Biatrial enlargement  LVH with repolarization abnormality  Prolonged QT  Abnormal ECG  When compared with ECG of 05-MAR-2016 11:35,  Significant changes have occurred    Referred By: YAHAIRA   SELF           Confirmed By:                             Imaging Results          X-Ray Chest AP Portable (Final result)  Result time 02/04/19 09:06:44    Final result by Cecilio Urena MD (02/04/19 09:06:44)                 Impression:      Mild CHF/pulmonary edema pattern.  Short-term PA and lateral chest follow-up could be performed to ensure resolution.      Electronically signed by: Cecilio Urena MD  Date:    02/04/2019  Time:    09:06             Narrative:    EXAMINATION:  XR CHEST AP PORTABLE    CLINICAL HISTORY:  SOB;    TECHNIQUE:  Single frontal view of the chest was performed.    COMPARISON:  03/03/2016    FINDINGS:  Mild increased lower lung opacities with probable small effusions.  Mild perihilar lung opacities with slightly indistinct pulmonary vasculature.  No gross pneumothorax.  Cardiac silhouette unchanged.  Scattered calcified atherosclerotic disease.                                the patient initially able to wean off BiPAP with breathing treatments and diuresis.  Patient received 2 doses of IV diuretics while in the emergency room.  Patient no longer hypoxic.  However patient became short of breath and requested BiPAP to be replaced.  This is again able to be weaned off.  I do not feel patient is shown stability to go home.  Will admit for COPD and CHF exacerbations.  Patient has markedly elevated leukocytosis.  I believe the  patient has clinical signs pneumonia and may be developing right lower lobe process.  Will treat with antibiotics as well.             Scribe Attestation:   Scribe #1: I performed the above scribed service and the documentation accurately describes the services I performed. I attest to the accuracy of the note.    Attending Attestation:           Physician Attestation for Scribe:  Physician Attestation Statement for Scribe #1: I, John Paris MD , reviewed documentation, as scribed by Nancy Villarreal  in my presence, and it is both accurate and complete.                    Clinical Impression:   The primary encounter diagnosis was Acute on chronic systolic congestive heart failure. Diagnoses of SOB (shortness of breath), COPD exacerbation, Hypoxia, and CHF (congestive heart failure) were also pertinent to this visit.                             John Paris MD  02/04/19 2259

## 2019-02-04 NOTE — ASSESSMENT & PLAN NOTE
likely duo to CHF exacerbation,improving with IV lasix,off bipap and stable on NC O 2,has home oxygen.

## 2019-02-04 NOTE — ED NOTES
"Pt complaining of Bipap, pt states "take that off, it's blowing too much." Bipap removed and pt placed on 3 L NC. Pt tolerated well with O2 sat 100%. MD made aware.   "

## 2019-02-05 NOTE — NURSING
Patient returned to unit from scheduled procedure. Patient awake, alert and oriented. Patient without c/o discomfort. Tele monitoring in progress. No apparent distress noted.

## 2019-02-05 NOTE — NURSING
Spoke with cousin of the patient her name is Natali Raul who will be taking the patient home. She is not ready to transport her yet. Will inform the patient when she is ready to leave.

## 2019-02-05 NOTE — PLAN OF CARE
Problem: Diabetes Comorbidity  Goal: Blood Glucose Level Within Desired Range    Intervention: Maintain Glycemic Control   02/04/19 1920   Monitor and Manage Ketoacidosis   Glycemic Management blood glucose monitoring;supplemental insulin given         Comments: Glucose monitoring was accomplished during shift, with insulin sliding scale coverage given as needed.

## 2019-02-05 NOTE — PLAN OF CARE
02/05/19 1426   Final Note   Assessment Type Discharge Planning Assessment   Anticipated Discharge Disposition Home   Hospital Follow Up  Appt(s) scheduled? Yes   Discharge plans and expectations educations in teach back method with documentation complete? No         SW informed Nurse Kiran pt was cleared from case management.

## 2019-02-05 NOTE — NURSING
Patient to scheduled testing as ordered. Tele monitoring in progress. Patient awake alert and oriented. No apparent distress noted.

## 2019-02-05 NOTE — PLAN OF CARE
02/05/19 1340   Discharge Assessment   Assessment Type Discharge Planning Assessment   Assessment information obtained from? Patient   Prior to hospitilization cognitive status: Alert/Oriented   Prior to hospitalization functional status: Independent   Current cognitive status: Alert/Oriented   Current Functional Status: Independent   Facility Arrived From: home   Lives With alone   Able to Return to Prior Arrangements yes   Is patient able to care for self after discharge? Yes   Who are your caregiver(s) and their phone number(s)? [place] [phone]   Patient's perception of discharge disposition home or selfcare   Readmission Within the Last 30 Days no previous admission in last 30 days   Patient currently being followed by outpatient case management? No   Patient currently receives any other outside agency services? No   Equipment Currently Used at Home bedside commode;oxygen;shower chair;rollator;cane, straight   Do you have any problems affording any of your prescribed medications? No   Is the patient taking medications as prescribed? yes   Does the patient have transportation home? Yes   Transportation Anticipated public transportation   Dialysis Name and Scheduled days N/A   Does the patient receive services at the [facility]? No   Discharge Plan A Home with family   Discharge Plan B Home with family;Home Health   DME Needed Upon Discharge  none   Patient/Family in Agreement with Plan yes

## 2019-02-05 NOTE — HOSPITAL COURSE
75 y/o F who has a past medical history of Tobacco abuse,, Cancer (10/10/2014),leukemia,, COPD (chronic obstructive pulmonary disease), Diabetes mellitus, Diabetes mellitus type II, Emphysema of lung, Hypercholesteremia, Hypertension, Myocardial infarct (12/25/2013), Palpitations, PVD (peripheral vascular disease), Seizures, and Stroke presents to the ED via EMS with shortness of breath. Pt symptoms began upon the pt waking up,. Via EMS pt received duo neb and 125 mg solumedrol in route. Pt was 88% on RA upon EMS arrival. Pt is on home O2 as needed. She was not on O2 upon EMS arrival. Pt is normally interacting, engaging, and talkative. No chest pain. Pt denies any other symptoms.she was at arrival was in severe respiratory distress,was started on bipap,recieved nebular with IV lasix with much improvement,respiratory status is improved,was able weaned to NC O 2,she has leucocytosis with definitive sign of infection,has been  Started on empiric IV Abx.leucocytoiss likely duo to history of leukemia,her respiratory status much improved with IV lasix and nebulizer and she was back to her baseline on 2 liter NC O 2,she was non compliance with diet and home lasix,CHF education provided,consulted quit smoking over 10 minutes,home lasix prescribed,patient has been discharged home with PCP follow up.

## 2019-02-05 NOTE — PLAN OF CARE
Problem: Skin Injury Risk Increased  Goal: Skin Health and Integrity    Intervention: Optimize Skin Protection   02/05/19 1438   Prevent Additional Skin Injury   Head of Bed (HOB) HOB at 45 degrees   Pressure Reduction Devices positioning supports utilized   Pressure Reduction Techniques frequent weight shift encouraged     Intervention: Promote and Optimize Oral Intake   02/05/19 1438   Monitor and Manage Anemia   Oral Nutrition Promotion physical activity promoted

## 2019-02-05 NOTE — NURSING
Discharge instructions given to patient at bedside. Patient   verbalized an understanding and states a willingness to comply. Saline lock removed. Tele monitoring removed.

## 2019-02-05 NOTE — DISCHARGE SUMMARY
[facility] Hospital Medicine  Discharge Summary      Patient Name: Susan Howell  MRN: [id number]  Admission Date: 2/4/2019  Hospital Length of Stay: 1 days  Discharge Date and Time:  02/05/2019 12:48 PM  Attending Physician: Faraz Harris MD   Discharging Provider: Faraz Harris MD  Primary Care Provider: Linda Mckee MD      HPI:   73 y/o F who has a past medical history of Asthma, Cancer (10/10/2014), COPD (chronic obstructive pulmonary disease), Diabetes mellitus, Diabetes mellitus type II, Emphysema of lung, Hypercholesteremia, Hypertension, Myocardial infarct (12/25/2013), Palpitations, PVD (peripheral vascular disease), Seizures, and Stroke presents to the ED via EMS with shortness of breath. Pt symptoms began upon the pt waking up this morning. Via EMS pt received duo neb and 125 mg solumedrol in route. Pt was 88% on RA upon EMS arrival. Pt is on home O2 as needed. She was not on O2 upon EMS arrival. Pt is normally interacting, engaging, and talkative. No chest pain. Pt denies any other symptoms.she was at arrival was in severe respiratory distress,was started on bipap,recieved nebular with IV lasix with much improvement,respiratory status is improved,was able weaned to NC O 2,she has leucocytosis with definitive sign of infection,has been  Started on empiric IV Abx.        * No surgery found *      Hospital Course:   73 y/o F who has a past medical history of Tobacco abuse,, Cancer (10/10/2014),leukemia,, COPD (chronic obstructive pulmonary disease), Diabetes mellitus, Diabetes mellitus type II, Emphysema of lung, Hypercholesteremia, Hypertension, Myocardial infarct (12/25/2013), Palpitations, PVD (peripheral vascular disease), Seizures, and Stroke presents to the ED via EMS with shortness of breath. Pt symptoms began upon the pt waking up,. Via EMS pt received duo neb and 125 mg solumedrol in route. Pt was 88% on RA upon EMS arrival. Pt is on home O2 as needed. She was  not on O2 upon EMS arrival. Pt is normally interacting, engaging, and talkative. No chest pain. Pt denies any other symptoms.she was at arrival was in severe respiratory distress,was started on bipap,recieved nebular with IV lasix with much improvement,respiratory status is improved,was able weaned to NC O 2,she has leucocytosis with definitive sign of infection,has been  Started on empiric IV Abx.leucocytoiss likely duo to history of leukemia,her respiratory status much improved with IV lasix and nebulizer and she was back to her baseline on 2 liter NC O 2,she was non compliance with diet and home lasix,CHF education provided,consulted quit smoking over 10 minutes,home lasix prescribed,patient has been discharged home with PCP follow up.     Consults:   Consults (From admission, onward)        Status Ordering Provider     Inpatient consult to Social Work  Once     Provider:  (Not yet assigned)    Acknowledged CHRIS BARFIELD consult to case management  Once     Provider:  (Not yet assigned)    Acknowledged DARIO BALL          No new Assessment & Plan notes have been filed under this hospital service since the last note was generated.  Service: Hospital Medicine    Final Active Diagnoses:    Diagnosis Date Noted POA    PRINCIPAL PROBLEM:  Acute on chronic combined systolic and diastolic congestive heart failure [I50.43] 02/04/2019 Yes    Acute respiratory failure with hypoxia [J96.01] 02/04/2019 Yes    Essential hypertension [I10] 02/04/2019 Yes    CML (chronic myelocytic leukemia) [C92.10] 03/28/2018 Yes    Anemia of chronic disease [D63.8] 11/04/2014 Yes     Chronic    Hyperlipidemia [E78.5] 10/27/2014 Yes     Chronic    Type 2 diabetes mellitus, uncontrolled [E11.65] 08/19/2014 Yes     Chronic    Elevated troponin [R74.8] 08/19/2014 Yes    Tobacco abuse [Z72.0] 06/07/2014 Yes     Chronic    PVD (peripheral vascular disease) [I73.9]  Yes    COPD (chronic obstructive pulmonary  disease) [J44.9]  Yes     Chronic      Problems Resolved During this Admission:       Discharged Condition: stable    Disposition: Home or Self Care    Follow Up:  Follow-up Information     Linda Mckee MD In 1 week.    Specialty:  General Practice  Contact information:  [address] [place] [address]  [id number]  [place] [id number] [phone]                 Patient Instructions:      Activity as tolerated       Significant Diagnostic Studies: Labs:   BMP:   Recent Labs   Lab 02/04/19  0834 02/05/19  0508   * 293*    138   K 3.7 4.8    100   CO2 27 30*   BUN 11 22   CREATININE 0.8 1.0   CALCIUM 8.4* 9.5   MG 2.0  --    , CBC   Recent Labs   Lab 02/04/19  0834 02/05/19  0508   WBC 32.36* 30.64*   HGB 11.2* 10.5*   HCT 35.7* 33.5*   * 506*    and Troponin   Recent Labs   Lab 02/04/19  2312   TROPONINI 0.152*     Radiology: X-Ray: CXR: X-Ray Chest 1 View (CXR): No results found for this visit on 02/04/19. and X-Ray Chest PA and Lateral (CXR): No results found for this visit on 02/04/19.  Cardiac Graphics: Echocardiogram:   2D echo with color flow doppler:   Results for orders placed or performed during the hospital encounter of 03/03/16   2D echo with color flow doppler   Result Value Ref Range    QEF 40 (A) 55 - 65    Mitral Valve Regurgitation TRIVIAL     Diastolic Dysfunction Yes (A)     Est. PA Systolic Pressure 30.47     Pericardial Effusion NONE     Mitral Valve Mobility NORMAL     Tricuspid Valve Regurgitation TRIVIAL     and Transthoracic echo (TTE) complete (Cupid Only): No results found for this or any previous visit.    Pending Diagnostic Studies:     Procedure Component Value Units Date/Time    Transthoracic echo (TTE) complete (Cupid Only) [[id number]]     Order Status:  Sent Lab Status:  No result          Medications:  Reconciled Home Medications:      Medication List      CHANGE how you take these medications    insulin glargine 100 unit/mL injection  Commonly known as:   "LANTUS  Inject 18 Units into the skin every evening.  What changed:  how much to take        CONTINUE taking these medications    ADVAIR DISKUS 100-50 mcg/dose diskus inhaler  Generic drug:  fluticasone-salmeterol 100-50 mcg/dose  Take 1 puff by mouth Twice daily.     amLODIPine 10 MG tablet  Commonly known as:  NORVASC  Take 1 tablet (10 mg total) by mouth once daily.     ANORO ELLIPTA 62.5-25 mcg/actuation Dsdv  Generic drug:  umeclidinium-vilanterol     aspirin 81 MG EC tablet  Commonly known as:  ECOTRIN  Take 81 mg by mouth once daily.     atorvastatin 80 MG tablet  Commonly known as:  LIPITOR  TK 1 T PO D     BD ULTRA-FINE ANISHA PEN NEEDLE 32 gauge x 5/32" Ndle  Generic drug:  pen needle, diabetic  use for insulin up to FOUR TIMES DAILY     BIDIL 20-37.5 mg Tab  Generic drug:  isosorbide-hydrALAZINE 20-37.5 mg  TK 1 T PO TID     CALCIUM 500 + D 500 mg(1,250mg) -200 unit per tablet  Generic drug:  calcium-vitamin D3  Take 1 tablet by mouth 2 (two) times daily with meals.     cloNIDine 0.1 MG tablet  Commonly known as:  CATAPRES  Take 1 tablet (0.1 mg total) by mouth 2 (two) times daily.     clopidogrel 75 mg tablet  Commonly known as:  PLAVIX  TK 1 T PO D     CRESTOR 20 MG tablet  Generic drug:  rosuvastatin  Take 10 mg by mouth every evening.     ergocalciferol 50,000 unit Cap  Commonly known as:  ERGOCALCIFEROL  Take 50,000 Units by mouth every 7 days.     famotidine 40 MG tablet  Commonly known as:  PEPCID     furosemide 40 MG tablet  Commonly known as:  LASIX  Take 1 tablet (40 mg total) by mouth once daily.     gabapentin 300 MG capsule  Commonly known as:  NEURONTIN     HYDROcodone-acetaminophen 5-325 mg per tablet  Commonly known as:  NORCO  Take 1 tablet by mouth 3 (three) times daily as needed.     imatinib 400 MG Tab  Commonly known as:  GLEEVEC  Take 1 tablet (400 mg total) by mouth once daily.     isosorbide mononitrate 30 MG 24 hr tablet  Commonly known as:  IMDUR  Take 1 tablet (30 mg total) by " mouth once daily.     levETIRAcetam 500 MG Tab  Commonly known as:  KEPPRA  Take 2 tablets by mouth Twice daily.     lisinopril 40 MG tablet  Commonly known as:  PRINIVIL,ZESTRIL  Take 1 tablet (40 mg total) by mouth once daily.     metoprolol tartrate 25 MG tablet  Commonly known as:  LOPRESSOR  TK 1 T PO BID     nitroGLYCERIN 0.4 MG SL tablet  Commonly known as:  NITROSTAT  Place 1 tablet (0.4 mg total) under the tongue every 5 (five) minutes as needed for Chest pain.     prednisoLONE acetate 1 % Drps  Commonly known as:  PRED FORTE     ranitidine 300 MG tablet  Commonly known as:  ZANTAC  TK 1 T PO QHS     ranolazine 500 MG Tb12  Commonly known as:  RANEXA  Take 500 mg by mouth 2 (two) times daily.     tiotropium 18 mcg inhalation capsule  Commonly known as:  SPIRIVA  Inhale 18 mcg into the lungs once daily.     VENTOLIN HFA 90 mcg/actuation inhaler  Generic drug:  albuterol  Inhale 2 puffs into the lungs every 4 to 6 hours as needed.        STOP taking these medications    levoFLOXacin 750 MG tablet  Commonly known as:  LEVAQUIN     predniSONE 20 MG tablet  Commonly known as:  DELTASONE            Indwelling Lines/Drains at time of discharge:   Lines/Drains/Airways          None          Time spent on the discharge of patient:less than 30  minutes  Patient was seen and examined on the date of discharge and determined to be suitable for discharge.         Faraz Harris MD  Department of Hospital Medicine  [facility]

## 2019-02-05 NOTE — NURSING
Pt had to return to the floor no transportation available for her at this time. Pt returned to room 310B .

## 2019-02-05 NOTE — PROGRESS NOTES
[facility]    WRITTEN HEALTHCARE AND DISCHARGE INFORMATION                        Help at Home           [phone]  After discharge for assistance [facility] On Call Nurse Care Line 24/7  Assistance    Things You are responsible For To Manage Your Care At Home:  1.    Getting your prescriptions filled   2.    Taking your medications as directed, DO NOT MISS ANY DOSES!  3.    Going to your follow-up doctor appointment. This is important because it  allow the doctor to monitor your progress and determine if  any changes need to made to your treatment plan.     Thank you for choosing [facility] for your care.  Please answer any calls you may receive from [facility] we want to continue to support you as you manage your healthcare needs. [facility] is happy to have the opportunity to serve you.     Sincerely,  Your [facility] Team,  Stephanie [phone]       Follow-up Information     Linda Mckee MD On 2/15/2019.    Specialty:  General Practice  Why:  Outpatient Services @1:45pm   Contact information:  [address] [place] [address]  [id number]  [place] [address]  [phone]

## 2019-02-05 NOTE — CONSULTS
RANDI contacted pt daughter Yamila at [phone], she stated she is at work until 12:00am and her other sister doesn't have a car. She gave SW the number for the transportation company that usually picks up her mom from her doctor appointments.      RANDI contacted Edwardo Pardo at [employer] at [phone] he stated he knows the pt and will pick her up between 2:00pm and 2:30pm

## 2019-02-05 NOTE — NURSING
Patient escorted to vehicle via w/c for discharge home. Patient escorted by transport and accompanied by family. No apparent distress noted.

## 2019-02-05 NOTE — PROGRESS NOTES
RANDI scheduled a wheelchair van through [employer] for the pt to go home to [address] [address] [place] [address]. RANDI spoke with pt daughter Yamila at [phone] she stated Emma will be at the home waiting on the pt.

## 2019-03-08 PROBLEM — R53.81 DEBILITY: Status: ACTIVE | Noted: 2019-01-01

## 2019-03-08 PROBLEM — J18.9 PNEUMONIA OF LOWER LOBE DUE TO INFECTIOUS ORGANISM: Status: ACTIVE | Noted: 2019-01-01

## 2019-03-08 NOTE — ED PROVIDER NOTES
"Encounter Date: 3/7/2019    SCRIBE #1 NOTE: I, Charlotte Crews, am scribing for, and in the presence of,  Armen Barnes MD. I have scribed the following portions of the note - Other sections scribed: HPI, ROS, and PE.       History     Chief Complaint   Patient presents with    Shortness of Breath     Hx of copd/chf. Pt reports sob x1 hour. Pt denies chest pain, dizziness. Pt is tachypneic. Pt was 84% on 2L NC at home; now on nonrebreather     CC: Shortness of Breath    HPI: This 74 y.o Female with a PMHx of Asthma, Cancer, CHF, Cigarette smoker, COPD, DM, Emphysema of lung, [place], Hypercholesteremia, HTN, MI, on home oxygen therapy, Palpitations, PVD, Seizures, Stroke, and Unsteady gait presents to the ED via EMS for emergent evaluation of shortness of breath. Pt reports that she is unable to pass gas. Pt attempts to belch but is unable. Pt notes she "can't lay flat" and feels "weak." Pt is inconsistent with rx'd medication. Pt had carbonated soda and michael seltzer, with no improvements made. Pt denies hx of similar symptoms in the past. Pt is a smoker. Pt denies fever, cough, sore throat, rhinorrhea, abdominal pain, or N/V/D. No exacerbating factors reported.           The history is provided by the patient. No  was used.     Review of patient's allergies indicates:   Allergen Reactions    Morphine      Pt states, "I'm not allergic to morphine they gave me too much at [facility]. Instead of giving me 2mg she gave me 5mg and I was almost dead."     Codeine      Blisters vs sweling (pt unsure which one)    Iodine containing multivitamin      Blisters vs swelling (pt unsure which one.)    Lidocaine      Past Medical History:   Diagnosis Date    Asthma     Cancer 10/10/2014    Bone cancer     CHF (congestive heart failure)     Cigarette smoker     COPD (chronic obstructive pulmonary disease)     Diabetes mellitus     Diabetes mellitus type II     Emphysema of lung     [place] (hard of " hearing)     Hypercholesteremia     Hypertension     Myocardial infarct 2013    On home oxygen therapy     Palpitations     Pneumonia 2019    PVD (peripheral vascular disease)     Seizures     Stroke     Unsteady gait     uses a cane & walker     Past Surgical History:   Procedure Laterality Date    APPENDECTOMY       SECTION      CHOLECYSTECTOMY      HYSTERECTOMY      VASCULAR SURGERY      stent in L leg; unable to place in R leg d/t blockage     Family History   Problem Relation Age of Onset    Diabetes Mother     Asthma Mother     Cancer Father     Breast cancer Sister     Colon cancer Neg Hx     Ovarian cancer Neg Hx      Social History     Tobacco Use    Smoking status: Current Every Day Smoker     Packs/day: 0.50     Years: 56.00     Pack years: 28.00     Types: Cigarettes    Smokeless tobacco: Never Used   Substance Use Topics    Alcohol use: No    Drug use: No     Review of Systems   Constitutional: Negative for chills and fever.   HENT: Negative for ear pain, rhinorrhea and sore throat.    Eyes: Negative for pain and redness.   Respiratory: Positive for shortness of breath. Negative for cough.    Cardiovascular: Negative for chest pain.   Gastrointestinal: Negative for abdominal pain, diarrhea, nausea and vomiting.   Endocrine: Negative for polyuria.   Genitourinary: Negative for dysuria and hematuria.   Musculoskeletal: Negative for back pain and neck pain.   Skin: Negative for rash.   Neurological: Positive for weakness. Negative for numbness and headaches.   Hematological: Does not bruise/bleed easily.   Psychiatric/Behavioral: Negative for confusion. The patient is not nervous/anxious.    All other systems reviewed and are negative.      Physical Exam     Initial Vitals   BP Pulse Resp Temp SpO2   19 2126 19 2126 19 2127 19 0230 19 212   (!) 175/79 104 (!) 24 98.6 °F (37 °C) 100 %      MAP       --                Physical  Exam    Nursing note and vitals reviewed.  Constitutional: She appears well-developed and well-nourished. She is not diaphoretic. No distress.   HENT:   Head: Normocephalic and atraumatic.   Nose: Nose normal.   Mouth/Throat: Oropharynx is clear and moist.   Eyes: EOM are normal. Pupils are equal, round, and reactive to light. No scleral icterus.   Neck: Normal range of motion. Neck supple. No JVD present.   Cardiovascular: Normal rate, regular rhythm, normal heart sounds and intact distal pulses. Exam reveals no gallop and no friction rub.    No murmur heard.  Pulmonary/Chest: No stridor. Tachypnea noted. No respiratory distress. She has wheezes. She has no rhonchi. She has no rales.   (-) decreased air movement    Abdominal: Soft. Normal appearance and bowel sounds are normal. She exhibits no distension. There is no tenderness. There is no rebound and no guarding.   Musculoskeletal: Normal range of motion. She exhibits no edema or tenderness.   Neurological: She is alert and oriented to person, place, and time. She has normal strength. No cranial nerve deficit or sensory deficit.   Skin: Skin is warm and dry. No rash noted.   (+) extremities cool to touch   Psychiatric: She has a normal mood and affect. Her behavior is normal.         ED Course   Procedures  Labs Reviewed   CBC W/ AUTO DIFFERENTIAL - Abnormal; Notable for the following components:       Result Value    WBC 22.00 (*)     Hemoglobin 10.6 (*)     Hematocrit 33.7 (*)     MCV 76 (*)     MCH 24.0 (*)     MCHC 31.5 (*)     RDW 22.4 (*)     Platelets 662 (*)     Mono% 2.0 (*)     Basophil% 5.0 (*)     Platelet Estimate Increased (*)     All other components within normal limits   COMPREHENSIVE METABOLIC PANEL - Abnormal; Notable for the following components:    Glucose 154 (*)     ALT 8 (*)     All other components within normal limits   TROPONIN I - Abnormal; Notable for the following components:    Troponin I 0.039 (*)     All other components within  normal limits   B-TYPE NATRIURETIC PEPTIDE - Abnormal; Notable for the following components:    BNP 1,267 (*)     All other components within normal limits   TROPONIN I - Abnormal; Notable for the following components:    Troponin I 0.042 (*)     All other components within normal limits   CBC W/ AUTO DIFFERENTIAL - Abnormal; Notable for the following components:    WBC 17.33 (*)     Hemoglobin 10.5 (*)     Hematocrit 34.4 (*)     MCV 78 (*)     MCH 23.7 (*)     MCHC 30.5 (*)     RDW 22.6 (*)     Platelets 642 (*)     Gran # (ANC) 16.1 (*)     Lymph # 0.9 (*)     Gran% 93.0 (*)     Lymph% 5.0 (*)     Mono% 1.5 (*)     All other components within normal limits    Narrative:     Recoll. [id number] by RANULFO at 03/08/2019 09:00, reason: Specimen   clotted 03/08/2019  09:00   BASIC METABOLIC PANEL - Abnormal; Notable for the following components:    CO2 22 (*)     Glucose 312 (*)     eGFR if non  55 (*)     All other components within normal limits   TROPONIN I - Abnormal; Notable for the following components:    Troponin I 0.037 (*)     All other components within normal limits   ISTAT PROCEDURE - Abnormal; Notable for the following components:    POC PH 7.326 (*)     POC PCO2 61.2 (*)     POC PO2 27 (*)     POC HCO3 31.9 (*)     POC SATURATED O2 45 (*)     POC TCO2 34 (*)     All other components within normal limits   ISTAT PROCEDURE - Abnormal; Notable for the following components:    POC PO2 138 (*)     All other components within normal limits        ECG Results          EKG 12-lead (Final result)  Result time 03/08/19 20:51:48    Final result by Interface, Lab In [facility] (03/08/19 20:51:48)                 Narrative:    Test Reason : R07.9,    Vent. Rate : 094 BPM     Atrial Rate : 094 BPM     P-R Int : 164 ms          QRS Dur : 084 ms      QT Int : 410 ms       P-R-T Axes : 062 093 -26 degrees     QTc Int : 512 ms    Normal sinus rhythm  Possible Left atrial enlargement  Rightward axis  LVH with  repolarization abnormality  Prolonged QT  Abnormal ECG  When compared with ECG of 04-FEB-2019 08:18,  Significant changes have occurred  Confirmed by Fermin Healy MD ([id number]) on 3/8/2019 8:51:42 PM    Referred By: YAHAIRA   SELF           Confirmed By:Fermin Healy MD                            Imaging Results          X-Ray Chest AP Portable (Final result)  Result time 03/07/19 23:20:28    Final result by Lauren Mccord MD (03/07/19 23:20:28)                 Impression:      Right lower lobe opacity, which is concerning for pneumonic process.      Electronically signed by: Lauren Mccord  Date:    03/07/2019  Time:    23:20             Narrative:    EXAMINATION:  AP PORTABLE CHEST    CLINICAL HISTORY:  Chest Pain;    TECHNIQUE:  AP portable chest radiograph was submitted.    COMPARISON:  02/04/2019    FINDINGS:  AP portable chest radiograph demonstrates a cardiac silhouette within normal limits.  There is right lower lobe opacity.  There is no pneumothorax or significant pleural fluid.  Dextroscoliosis is present.                                 Medical Decision Making:   Initial Assessment:   74-year-old female with history of COPD and bone cancer presenting with chest pain, shortness of breath. Patient states feels like something on her chest.   On exam, patient mildly tachypneic though speaking full sentences.  She has significant wheezing bilaterally with prolonged expiratory phase.  Patient states she continues to smoke.  She reports she is not taking medications.  Differential includes COPD exacerbation, ACS, less likely pneumonia, PE.  We will get labs, chest x-ray, given nebulizers, and reassess.  ED Management:  Patient with intermittent worsening symptoms. Also with a substantial anxiety component.  White count, troponin, BNP elevated.  Chest x-ray with possible pneumonia.  Patient afebrile on rectal temp.  Minimal tachycardia.  Suspect COPD exacerbation, though CHF also possible, as well as  potential component for pneumonia.  We will admit to Hospital Medicine for further evaluation.  Blood gas substantially improved on BiPAP and after nebs, though the patient still remained tachypneic and anxious.  Given her significant anxiety and continuing needed BiPAP, she will be admitted to the ICU.            Scribe Attestation:   Scribe #1: I performed the above scribed service and the documentation accurately describes the services I performed. I attest to the accuracy of the note.    Attending Attestation:           Physician Attestation for Scribe:  Physician Attestation Statement for Scribe #1: I, Armen Barnes MD, reviewed documentation, as scribed by Charlotte Crews in my presence, and it is both accurate and complete.                 ED Course as of Mar 09 0412   Thu Mar 07, 2019   2158 EKG shows sinus rhythm, rate 94, no significant ST segment elevation or depression concerning for acute ischemia, relatively similar to prior on 4 February though noted improved T-wave appearance V5 and V6.  [GS]      ED Course User Index  [GS] Armen Barnes MD     Clinical Impression:       ICD-10-CM ICD-9-CM   1. Pneumonia of lower lobe due to infectious organism, unspecified laterality J18.1 483.8   2. Chest pain R07.9 786.50   3. COPD exacerbation J44.1 491.21         Disposition:   Disposition: Admitted  Condition: Fair                        Armen Barnes MD  03/09/19 0412

## 2019-03-08 NOTE — ED TRIAGE NOTES
"Pt presents to ER via EMS with c/o SOB that started appx 30 minutes PTA. EMA reports upon arrival to home pt was very upset/anxious and tachypneic.Initial O2 sats were in the low 80s on 2.5 liters of home O2. EMS applied non re breather and reports sats increased. Upon arrival to ER, pt continues to appear very anxious. Non rebreather remains in place, O2 sats 100%. Pt states "it is too hot in here, I can't breathe!'. Educated pt the air conditioner was turned all the way down, and to take slow deep breaths. Pt reports she started feeling weak and SOB at home. Reports worsens when lying down, eith chest tightness. Denies N/V, diaphoresis. Hx COPD, emphysema, CHF.   "

## 2019-03-08 NOTE — SUBJECTIVE & OBJECTIVE
"Past Medical History:   Diagnosis Date    Asthma     Cancer 10/10/2014    Bone cancer     CHF (congestive heart failure)     Cigarette smoker     COPD (chronic obstructive pulmonary disease)     Diabetes mellitus     Diabetes mellitus type II     Emphysema of lung     [place] (hard of hearing)     Hypercholesteremia     Hypertension     Myocardial infarct 2013    On home oxygen therapy     Palpitations     Pneumonia 2019    PVD (peripheral vascular disease)     Seizures     Stroke     Unsteady gait     uses a cane & walker       Past Surgical History:   Procedure Laterality Date    APPENDECTOMY       SECTION      CHOLECYSTECTOMY      HYSTERECTOMY      VASCULAR SURGERY      stent in L leg; unable to place in R leg d/t blockage       Review of patient's allergies indicates:   Allergen Reactions    Morphine      Pt states, "I'm not allergic to morphine they gave me too much at [facility]. Instead of giving me 2mg she gave me 5mg and I was almost dead."     Codeine      Blisters vs sweling (pt unsure which one)    Iodine containing multivitamin      Blisters vs swelling (pt unsure which one.)    Lidocaine        No current facility-administered medications on file prior to encounter.      Current Outpatient Medications on File Prior to Encounter   Medication Sig    ADVAIR DISKUS 100-50 mcg/dose diskus inhaler Take 1 puff by mouth Twice daily.    amlodipine (NORVASC) 10 MG tablet Take 1 tablet (10 mg total) by mouth once daily.    ANORO ELLIPTA 62.5-25 mcg/actuation DsDv     aspirin (ECOTRIN) 81 MG EC tablet Take 81 mg by mouth once daily.      atorvastatin (LIPITOR) 80 MG tablet TK 1 T PO D    BD ULTRA-FINE ANISHA PEN NEEDLE 32 gauge x 5/32" Ndle use for insulin up to FOUR TIMES DAILY    BIDIL 20-37.5 mg Tab TK 1 T PO TID    calcium-vitamin D (CALCIUM-VITAMIN D) 500 mg(1,250mg) -200 unit per tablet Take 1 tablet by mouth 2 (two) times daily with meals.      clopidogrel " (PLAVIX) 75 mg tablet TK 1 T PO D    CRESTOR 20 mg tablet Take 10 mg by mouth every evening.     ergocalciferol (ERGOCALCIFEROL) 50,000 unit Cap Take 50,000 Units by mouth every 7 days.      famotidine (PEPCID) 40 MG tablet     furosemide (LASIX) 40 MG tablet Take 1 tablet (40 mg total) by mouth once daily.    gabapentin (NEURONTIN) 300 MG capsule     hydrocodone-acetaminophen 5-325mg (NORCO) 5-325 mg per tablet Take 1 tablet by mouth 3 (three) times daily as needed.     imatinib (GLEEVEC) 400 MG Tab Take 1 tablet (400 mg total) by mouth once daily.    insulin glargine (LANTUS) 100 unit/mL injection Inject 18 Units into the skin every evening.    levetiracetam (KEPPRA) 500 MG Tab Take 2 tablets by mouth Twice daily.    lisinopril (PRINIVIL,ZESTRIL) 40 MG tablet Take 1 tablet (40 mg total) by mouth once daily.    metoprolol tartrate (LOPRESSOR) 25 MG tablet TK 1 T PO BID    nitroGLYCERIN (NITROSTAT) 0.4 MG SL tablet Place 1 tablet (0.4 mg total) under the tongue every 5 (five) minutes as needed for Chest pain.    prednisoLONE acetate (PRED FORTE) 1 % DrpS     ranitidine (ZANTAC) 300 MG tablet TK 1 T PO QHS    ranolazine (RANEXA) 500 MG Tb12 Take 500 mg by mouth 2 (two) times daily.    tiotropium (SPIRIVA) 18 mcg inhalation capsule Inhale 18 mcg into the lungs once daily.      VENTOLIN HFA 90 mcg/actuation HFAA Inhale 2 puffs into the lungs every 4 to 6 hours as needed.    cloNIDine (CATAPRES) 0.1 MG tablet Take 1 tablet (0.1 mg total) by mouth 2 (two) times daily.    isosorbide mononitrate (IMDUR) 30 MG 24 hr tablet Take 1 tablet (30 mg total) by mouth once daily.     Family History     Problem Relation (Age of Onset)    Asthma Mother    Breast cancer Sister    Cancer Father    Diabetes Mother        Tobacco Use    Smoking status: Current Every Day Smoker     Packs/day: 0.50     Years: 56.00     Pack years: 28.00     Types: Cigarettes    Smokeless tobacco: Never Used   Substance and Sexual  Activity    Alcohol use: No    Drug use: No    Sexual activity: No     Review of Systems   Allergic/Immunologic: Positive for food allergies.     Objective:     Vital Signs (Most Recent):  Temp: 97.7 °F (36.5 °C) (03/08/19 0705)  Pulse: 76 (03/08/19 1202)  Resp: 20 (03/08/19 0705)  BP: 132/60 (03/08/19 1202)  SpO2: 99 % (03/08/19 1202) Vital Signs (24h Range):  Temp:  [97.7 °F (36.5 °C)-98.8 °F (37.1 °C)] 97.7 °F (36.5 °C)  Pulse:  [] 76  Resp:  [17-41] 20  SpO2:  [98 %-100 %] 99 %  BP: (116-177)/() 132/60     Weight: 68 kg (150 lb)  Body mass index is 25.75 kg/m².    Physical Exam   Constitutional: She is oriented to person, place, and time. She appears well-developed and well-nourished.   HENT:   Head: Normocephalic and atraumatic.   Cardiovascular: Normal rate. Exam reveals no gallop and no friction rub.   Pulmonary/Chest: Effort normal and breath sounds normal. No stridor. No respiratory distress. She has no wheezes. She has no rales.   Abdominal: Soft. Bowel sounds are normal. She exhibits no distension. There is no tenderness. There is no guarding.   Neurological: She is alert and oriented to person, place, and time.   Skin: Skin is warm and dry.   Psychiatric:   Flat mood. Not very interested.    Nursing note and vitals reviewed.          Significant Labs:   BMP:   Recent Labs   Lab 03/08/19  0825   *      K 3.7      CO2 22*   BUN 11   CREATININE 1.0   CALCIUM 9.5     CBC:   Recent Labs   Lab 03/07/19  2150 03/08/19  0912   WBC 22.00* 17.33*   HGB 10.6* 10.5*   HCT 33.7* 34.4*   * 642*       Significant Imaging:  CXR- RLL infiltrate

## 2019-03-08 NOTE — ED NOTES
"Pt noted to verbally call out from room. Upon arrival, pt sitting with legs off of the end of the bed stating "I can't breathe!". Informed pt to take slow deep breathes in through her nose and out through her mouth. Pt too anxious to follow direction at this time. MD aware. BiPAP placed back on pt, which seemed to help with anxiety. Verbal order for nebulizer.   "

## 2019-03-08 NOTE — PT/OT/SLP EVAL
Occupational Therapy   Evaluation/treatment  Name: Susan Howell  MRN: [id number]  Admitting Diagnosis:  Acute on chronic combined systolic and diastolic congestive heart failure      Recommendations:     Discharge Recommendations: (home with HHOT)  Discharge Equipment Recommendations:  (TBD)  Barriers to discharge:  None    Assessment:     Susan Howell is a 74 y.o. female with a medical diagnosis of Acute on chronic combined systolic and diastolic congestive heart failure.  She presents with c/o dizziness with OOB mobility, poor safety awareness, impaired memory and unreliable historian.  She is a fall risk and impulsive and had 2 episodes LOB walking to/from bathroom with OT.  Pt. reportedly a has SPC and shower chair at home, told to OT during interview but per chart has rollator and SPC.. She would benefit from HHOT at discharge.  DME for home pending progress.  Continue with OT POC.  h . Performance deficits affecting function: weakness, impaired functional mobilty, impaired balance, impaired endurance, gait instability, impaired self care skills, decreased safety awareness.      Rehab Prognosis: Good; patient would benefit from acute skilled OT services to address these deficits and reach maximum level of function.       Plan:     Patient to be seen 4 x/week to address the above listed problems via self-care/home management, therapeutic exercises, therapeutic groups  · Plan of Care Expires: 03/29/19  · Plan of Care Reviewed with: patient    Subjective     Chief Complaint: none  Patient/Family Comments/goals: none    Occupational Profile:  Living Environment: lives alone in [facility] with 16 steps with elevator access; has tub/shower combo with shower chair.  Previous level of function: pt. Reportedly indep-Andrew with ADLs; does all ADLS; neighbor provides transportation.  Roles and Routines: active  Equipment Used at Home:  shower chair, cane, straight  Assistance upon Discharge: says she has a son who can help her but not  stay all night; has neighbor who is with her during the day and can help her as needed and stay with her if need; pt unreliavle source at this time.; need ongoing assessment re;post acute help.     Pain/Comfort:  · Pain Rating 1: 0/10  · Pain Rating Post-Intervention 1: 0/10    Patients cultural, spiritual, Mandaen conflicts given the current situation: no    Objective:     Communicated with: nurse prior to session.  Patient found HOB elevated with telemetry, oxygen, bed alarm upon OT entry to room.    General Precautions: Standard, fall   Orthopedic Precautions:N/A   Braces: N/A     Occupational Performance:    Bed Mobility:    · Patient completed Scooting/Bridging with independence  · Patient completed Supine to Sit with independence and HOB elevated  · Patient completed Sit to Supine with independence    Functional Mobility/Transfers:  · Patient completed Sit <> Stand Transfer with supervision  with  no assistive device   · Patient completed Toilet Transfer Step Transfer technique with stand by assistance with  no AD  · Functional Mobility:ambulated to bathroom, impulsive and had 2 episodes LOB walking out of bathroom and back to bed.  Pt did report some dizziness. O2 sats 99% on RA and 100 % at rest with 2 L O2 NC.    Activities of Daily Living:  · Feeding:  independence HOB elevated  · Grooming: stand by assistance standing at sink hand hygiene after toileting for safety/judgement and balance safety  · Lower Body Dressing: modified independence socks donned and doffed seated EOB long sitting   · Toileting: stand by assistance simulated 2/2 did not have to use    Cognitive/Visual Perceptual:  Cognitive/Psychosocial Skills:     -       Oriented to: Person, Place, Time and Situation   -       Follows Commands/attention:Follows one-step commands  -       Communication: clear/fluent  -       Memory: Impaired LTM and Poor immediate recall  -       Safety awareness/insight to disability: impaired   -        Mood/Affect/Coping skills/emotional control: Appropriate to situation  Visual/Perceptual:      -Intact .    Physical Exam:  Balance:    -       sitting;  good/good  standing; fair plus  dynamic;  fair -inconsistent  Postural examination/scapula alignment:    -       No postural abnormalities identified  Dominant hand:    -       right  Upper Extremity Range of Motion:     -       Right Upper Extremity: WFL  -       Left Upper Extremity: WFL  Upper Extremity Strength:    -       Right Upper Extremity: WFL  -       Left Upper Extremity: WFL   Strength:    -       Right Upper Extremity: WFL  -       Left Upper Extremity: WFL    AMPAC 6 Click ADL:  AMPAC Total Score: 21    Treatment & Education:  Role ofoT and POC  Safety ed: call don't fall and educated fall risk 2/2 lacks judgment and impulsive movements during fx ambulation in room for ADLS; pt nearly running in room and poor self monitoring despite dizziness complaint and 2 episodes LOB during fx ambulation in room.   Education:    Patient left HOB elevated with all lines intact, call button in reach and bed alarm on    GOALS:   Multidisciplinary Problems     Occupational Therapy Goals        Problem: Occupational Therapy Goal    Goal Priority Disciplines Outcome Interventions   Occupational Therapy Goal     OT, PT/OT Ongoing (interventions implemented as appropriate)    Description:  Goals to be met by: 3/18/2019  Patient will increase functional independence with ADLs by performing:    Grooming while standing at sink with Modified [place].  Toileting from toilet with Modified [place] for hygiene and clothing management.   Toilet transfer to toilet with Modified [place].                      History:     Past Medical History:   Diagnosis Date    Asthma     Cancer 10/10/2014    Bone cancer     CHF (congestive heart failure)     Cigarette smoker     COPD (chronic obstructive pulmonary disease)     Diabetes mellitus     Diabetes mellitus type  II     Emphysema of lung     [place] (hard of hearing)     Hypercholesteremia     Hypertension     Myocardial infarct 2013    On home oxygen therapy     Palpitations     Pneumonia 2019    PVD (peripheral vascular disease)     Seizures     Stroke     Unsteady gait     uses a cane & walker       Past Surgical History:   Procedure Laterality Date    APPENDECTOMY       SECTION      CHOLECYSTECTOMY      HYSTERECTOMY      VASCULAR SURGERY      stent in L leg; unable to place in R leg d/t blockage       Time Tracking:     OT Date of Treatment: 19  OT Start Time: 1650  OT Stop Time: 1710  OT Total Time (min): 20 min    Billable Minutes:Evaluation 10  Self Care/Home Management 10  Total Time 20    Yarelis Franklin, OT  3/8/2019

## 2019-03-08 NOTE — HOSPITAL COURSE
75 y/o female presented with acute on chronic respiratory failure.  Probably multifactorial.  Noted to have RLL opacity on Xray.  Probable pneumonia and started on IV Vanc and Zosyn.  This was switched to Levaquin.  Also with acute on chronic combined systolic/diastolic CHF and started on IV Lasix.  EF of 35%.  Patient already home oxygen dependent.  Long history of COPD with acute exacerbation.  Continues to smoke.  Started on nebs and Prednisone.  Patient did well during hospital stay.  She remained afebrile and hemodynamically stable.  She is currently at her baseline.  Patient counseled on importance of smoking cessation and provided Nicotine patch.  Will also increase her Lasix to 40 mg bid upon discharge.  Doubt compliance with diet.  Patient is now ready for discharge home.  She will be discharged with 5 more days of oral Levaquin and a few more days of oral Prednisone.  She will follow up with PCP.

## 2019-03-08 NOTE — H&P
[facility] Medicine  History & Physical    Patient Name: Susan Howell  MRN: [id number]  Admission Date: 3/7/2019  Attending Physician: Rogelio Sandoval MD   Primary Care Provider: Linda Mckee MD         Patient information was obtained from patient, EMS personnel and ER records.     Subjective:     Principal Problem:Acute on chronic combined systolic and diastolic congestive heart failure    Chief Complaint:   Chief Complaint   Patient presents with    Shortness of Breath     Hx of copd/chf. Pt reports sob x1 hour. Pt denies chest pain, dizziness. Pt is tachypneic. Pt was 84% on 2L NC at home; now on nonrebreather        HPI: Mrs. Howell is a 75 yo F who presents from home on 3/7/19 with a CC of SOB.  It is very difficult to get a good history from the patient. She is very vague and does not seem interested to talk.  Most of history was obtained by reviewing the ED record. Apparently, she called EMS and said she could not breath. She was low 80's on her sats when EMS arrived.  The patient of note, is on home 02 at 2.5L.  The patient was found in the ED to have a RLL infiltrate, evidence of CHF by BNP. She was started on Lasix and Abx with good results. Initially, she was going to go to the ICU- but large clinical improvement in the ED and was downgraded to Tele. This patient lives by herself and uses both a cane and walker.    Past Medical History:   Diagnosis Date    Asthma     Cancer 10/10/2014    Bone cancer     CHF (congestive heart failure)     Cigarette smoker     COPD (chronic obstructive pulmonary disease)     Diabetes mellitus     Diabetes mellitus type II     Emphysema of lung     [place] (hard of hearing)     Hypercholesteremia     Hypertension     Myocardial infarct 12/25/2013    On home oxygen therapy     Palpitations     Pneumonia 03/07/2019    PVD (peripheral vascular disease)     Seizures     Stroke     Unsteady gait     uses a cane & walker  "      Past Surgical History:   Procedure Laterality Date    APPENDECTOMY       SECTION      CHOLECYSTECTOMY      HYSTERECTOMY      VASCULAR SURGERY      stent in L leg; unable to place in R leg d/t blockage       Review of patient's allergies indicates:   Allergen Reactions    Morphine      Pt states, "I'm not allergic to morphine they gave me too much at [facility]. Instead of giving me 2mg she gave me 5mg and I was almost dead."     Codeine      Blisters vs sweling (pt unsure which one)    Iodine containing multivitamin      Blisters vs swelling (pt unsure which one.)    Lidocaine        No current facility-administered medications on file prior to encounter.      Current Outpatient Medications on File Prior to Encounter   Medication Sig    ADVAIR DISKUS 100-50 mcg/dose diskus inhaler Take 1 puff by mouth Twice daily.    amlodipine (NORVASC) 10 MG tablet Take 1 tablet (10 mg total) by mouth once daily.    ANORO ELLIPTA 62.5-25 mcg/actuation DsDv     aspirin (ECOTRIN) 81 MG EC tablet Take 81 mg by mouth once daily.      atorvastatin (LIPITOR) 80 MG tablet TK 1 T PO D    BD ULTRA-FINE ANISHA PEN NEEDLE 32 gauge x 5/32" Ndle use for insulin up to FOUR TIMES DAILY    BIDIL 20-37.5 mg Tab TK 1 T PO TID    calcium-vitamin D (CALCIUM-VITAMIN D) 500 mg(1,250mg) -200 unit per tablet Take 1 tablet by mouth 2 (two) times daily with meals.      clopidogrel (PLAVIX) 75 mg tablet TK 1 T PO D    CRESTOR 20 mg tablet Take 10 mg by mouth every evening.     ergocalciferol (ERGOCALCIFEROL) 50,000 unit Cap Take 50,000 Units by mouth every 7 days.      famotidine (PEPCID) 40 MG tablet     furosemide (LASIX) 40 MG tablet Take 1 tablet (40 mg total) by mouth once daily.    gabapentin (NEURONTIN) 300 MG capsule     hydrocodone-acetaminophen 5-325mg (NORCO) 5-325 mg per tablet Take 1 tablet by mouth 3 (three) times daily as needed.     imatinib (GLEEVEC) 400 MG Tab Take 1 tablet (400 mg total) by mouth once " daily.    insulin glargine (LANTUS) 100 unit/mL injection Inject 18 Units into the skin every evening.    levetiracetam (KEPPRA) 500 MG Tab Take 2 tablets by mouth Twice daily.    lisinopril (PRINIVIL,ZESTRIL) 40 MG tablet Take 1 tablet (40 mg total) by mouth once daily.    metoprolol tartrate (LOPRESSOR) 25 MG tablet TK 1 T PO BID    nitroGLYCERIN (NITROSTAT) 0.4 MG SL tablet Place 1 tablet (0.4 mg total) under the tongue every 5 (five) minutes as needed for Chest pain.    prednisoLONE acetate (PRED FORTE) 1 % DrpS     ranitidine (ZANTAC) 300 MG tablet TK 1 T PO QHS    ranolazine (RANEXA) 500 MG Tb12 Take 500 mg by mouth 2 (two) times daily.    tiotropium (SPIRIVA) 18 mcg inhalation capsule Inhale 18 mcg into the lungs once daily.      VENTOLIN HFA 90 mcg/actuation HFAA Inhale 2 puffs into the lungs every 4 to 6 hours as needed.    cloNIDine (CATAPRES) 0.1 MG tablet Take 1 tablet (0.1 mg total) by mouth 2 (two) times daily.    isosorbide mononitrate (IMDUR) 30 MG 24 hr tablet Take 1 tablet (30 mg total) by mouth once daily.     Family History     Problem Relation (Age of Onset)    Asthma Mother    Breast cancer Sister    Cancer Father    Diabetes Mother        Tobacco Use    Smoking status: Current Every Day Smoker     Packs/day: 0.50     Years: 56.00     Pack years: 28.00     Types: Cigarettes    Smokeless tobacco: Never Used   Substance and Sexual Activity    Alcohol use: No    Drug use: No    Sexual activity: No     Review of Systems   Allergic/Immunologic: Positive for food allergies.     Objective:     Vital Signs (Most Recent):  Temp: 97.7 °F (36.5 °C) (03/08/19 0705)  Pulse: 76 (03/08/19 1202)  Resp: 20 (03/08/19 0705)  BP: 132/60 (03/08/19 1202)  SpO2: 99 % (03/08/19 1202) Vital Signs (24h Range):  Temp:  [97.7 °F (36.5 °C)-98.8 °F (37.1 °C)] 97.7 °F (36.5 °C)  Pulse:  [] 76  Resp:  [17-41] 20  SpO2:  [98 %-100 %] 99 %  BP: (116-177)/() 132/60     Weight: 68 kg (150 lb)  Body  mass index is 25.75 kg/m².    Physical Exam   Constitutional: She is oriented to person, place, and time. She appears well-developed and well-nourished.   HENT:   Head: Normocephalic and atraumatic.   Cardiovascular: Normal rate. Exam reveals no gallop and no friction rub.   Pulmonary/Chest: Effort normal and breath sounds normal. No stridor. No respiratory distress. She has no wheezes. She has no rales.   Abdominal: Soft. Bowel sounds are normal. She exhibits no distension. There is no tenderness. There is no guarding.   Neurological: She is alert and oriented to person, place, and time.   Skin: Skin is warm and dry.   Psychiatric:   Flat mood. Not very interested.    Nursing note and vitals reviewed.          Significant Labs:   BMP:   Recent Labs   Lab 03/08/19  0825   *      K 3.7      CO2 22*   BUN 11   CREATININE 1.0   CALCIUM 9.5     CBC:   Recent Labs   Lab 03/07/19  2150 03/08/19  0912   WBC 22.00* 17.33*   HGB 10.6* 10.5*   HCT 33.7* 34.4*   * 642*       Significant Imaging:  CXR- RLL infiltrate     Assessment/Plan:     * Acute on chronic combined systolic and diastolic congestive heart failure    EF of 35%.  Will continue lasix for now.  Daily labs.        Acute respiratory failure with hypoxia    With respiratory distress on admission. Initially required Bi-pap- now very comfortable on NC.  Hypoxia may be multi-factorial including, CHF, COPD and pneumonia.          COPD exacerbation    No Wheezing. Will consider Prednisone        Pneumonia of lower lobe due to infectious organism    RLL found on X-ray.  Sepsis not present.  Patient on Vanc and Zosyn given recent hospital stay 2/5.      Debility    Uses walker and cane at home.  Will order PT/OT eval for 3/9       Essential hypertension    Resume home meds.       CML (chronic myelocytic leukemia)    Likely cause of leukocytosis.  Less than normal        GERD (gastroesophageal reflux disease)    Resume home meds        Mild  protein malnutrition    Will discuss with patient       Anemia of chronic disease    No acute issues        Hyperlipidemia    Resume home meds.        History of CVA (cerebrovascular accident)    No acute issues        Epilepsy    Resume any home meds.        Elevated troponin    Flat pattern- doubt ACS        CAD s/p PCI    No acute issues        Tobacco abuse    Smoking cessation education > 3 minutes.        Emphysema of lung    She states she still continues to smoke          VTE Risk Mitigation (From admission, onward)        Ordered     IP VTE HIGH RISK PATIENT  Once      03/08/19 0448     Place ELEONORA hose  Until discontinued      03/08/19 0448     Place sequential compression device  Until discontinued      03/08/19 0448        Continue Lasix, Abx. +/- steroids     Rogelio Hernandez MD  Department of Hospital Medicine   [facility]

## 2019-03-08 NOTE — ED NOTES
Pt sitting up in bed, breakfast tray at bedside. Removed Bipap for breakfast. Pt tolerated well. Pt is on 3L NC, 100% O2 sat.

## 2019-03-08 NOTE — ASSESSMENT & PLAN NOTE
With respiratory distress on admission. Initially required Bi-pap- now very comfortable on NC.  Hypoxia may be multi-factorial including, CHF, COPD and pneumonia.

## 2019-03-08 NOTE — PLAN OF CARE
Problem: Occupational Therapy Goal  Goal: Occupational Therapy Goal  Goals to be met by: 3/18/2019  Patient will increase functional independence with ADLs by performing:    Grooming while standing at sink with Modified [place].  Toileting from toilet with Modified [place] for hygiene and clothing management.   Toilet transfer to toilet with Modified [place].    Outcome: Ongoing (interventions implemented as appropriate)  OT initial eval completed an treatment initiated. Pt would benefit from HHOT at discharge.  Fall risk and impulsive; 2 episodes LOB walking to/from bathroom with OT.  Pt. has SPC and shower chair at home. DME for home pending progress.  Continue with OT POC.

## 2019-03-08 NOTE — HPI
Mrs. Howell is a 75 yo F who presents from home on 3/7/19 with a CC of SOB.  It is very difficult to get a good history from the patient. She is very vague and does not seem interested to talk.  Most of history was obtained by reviewing the ED record. Apparently, she called EMS and said she could not breath. She was low 80's on her sats when EMS arrived.  The patient of note, is on home 02 at 2.5L.  The patient was found in the ED to have a RLL infiltrate, evidence of CHF by BNP. She was started on Lasix and Abx with good results. Initially, she was going to go to the ICU- but large clinical improvement in the ED and was downgraded to Tele. This patient lives by herself and uses both a cane and walker.

## 2019-03-09 PROBLEM — J96.21 ACUTE ON CHRONIC RESPIRATORY FAILURE WITH HYPOXIA: Status: ACTIVE | Noted: 2019-01-01

## 2019-03-09 NOTE — NURSING
Note patient seated at edge of bed still asking for a cigarette;   Security on hallway;    SPO2@99% on RA;     Dr. Anglni notified of situation;

## 2019-03-09 NOTE — PT/OT/SLP EVAL
Physical Therapy Evaluation    Patient Name:  Susan Howell   MRN:  [id number]    Recommendations:     Discharge Recommendations:  (- None required at this time. )   Discharge Equipment Recommendations: other (see comments)(- Rollator - as per patient request. )   Barriers to discharge: None    Assessment:     Susan Howell is a 74 y.o. female admitted with a medical diagnosis of Acute on chronic respiratory failure with hypoxia.  She presents with the following impairments/functional limitations:  impaired cardiopulmonary response to activity.  Pt is currently functioning at base line prior to hospitalization.     Rehab Prognosis: Good; patient would benefit from acute skilled PT services to address these deficits and reach maximum level of function.    Recent Surgery: * No surgery found *      Plan:     During this hospitalization, patient to be seen   to address the identified rehab impairments via   and progress toward the following goals:    · Plan of Care Expires:  03/09/19    Subjective     Chief Complaint: none stated  Patient/Family Comments/goals: to return to PLOF    Pain/Comfort:  · Pain Rating 1: 0/10    Patients cultural, spiritual, Episcopal conflicts given the current situation:      Living Environment:  Pt lives alone in a [facility] with [address], but pt utilizes the elevator to ascend/descend from living environment.   Prior to admission, patients level of function was Mod I.  Equipment used at home: shower chair, cane, straight, oxygen(- 2.0 Lpm of O2 via N.C. (as needed).).  DME owned (not currently used): none.  Upon discharge, patient will have assistance from Self.    Objective:     Communicated with Nurse Jackson prior to session.  Patient found supine with all lines intact and call button in reach peripheral IV, telemetry, bed alarm, oxygen(- 2.0 Lpm of O2 via N.C. )  upon PT entry to room.    General Precautions: Standard, fall   Orthopedic Precautions:Full weight bearing   Braces: N/A      Exams:  · Cognitive Exam:  Patient is oriented to Person, Place, Time and Situation  · Gross Motor Coordination:  WFL  · Postural Exam:  Patient presented with the following abnormalities:    · -       Rounded shoulders  · -       Forward head  · Skin Integrity/Edema:      · -       Skin integrity: Visible skin intact  · -       Edema: None noted .  · RLE ROM: WFL  · RLE Strength: WFL  · LLE ROM: WFL  · LLE Strength: WFL    Functional Mobility:  · Bed Mobility:     · Supine to Sit: supervision  · Sit to Supine: supervision  · Transfers:     · Sit to Stand:  supervision with no AD  · Gait: 200' x 2 with RW and CGA.  Pt required 2.0 Lpm of O2 via N.C. and a standing rest break < 1 min between trials.     AM-PAC 6 CLICK MOBILITY  Total Score:23     Patient left supine with all lines intact and call button in reach.    GOALS:   Multidisciplinary Problems     Physical Therapy Goals     Not on file          Multidisciplinary Problems (Resolved)        Problem: Physical Therapy Goal    Goal Priority Disciplines Outcome Goal Variances Interventions   Physical Therapy Goal   (Resolved)     PT, PT/OT Outcome(s) achieved                     History:     Past Medical History:   Diagnosis Date    Asthma     Cancer 10/10/2014    Bone cancer     CHF (congestive heart failure)     Cigarette smoker     COPD (chronic obstructive pulmonary disease)     Diabetes mellitus     Diabetes mellitus type II     Emphysema of lung     [place] (hard of hearing[place]     Hypercholesteremia     Hypertension     Myocardial infarct 2013    On home oxygen therapy     Palpitations     Pneumonia 2019    PVD (peripheral vascular disease)     Seizures     Stroke     Unsteady gait     uses a cane & walker       Past Surgical History:   Procedure Laterality Date    APPENDECTOMY       SECTION      CHOLECYSTECTOMY      HYSTERECTOMY      VASCULAR SURGERY      stent in L leg; unable to place in R leg d/t blockage        Time Tracking:     PT Received On: 03/09/19  PT Start Time: 1041     PT Stop Time: 1056  PT Total Time (min): 15 min     Billable Minutes: Evaluation 15 min      Ranjith Read, PT  03/09/2019

## 2019-03-09 NOTE — ASSESSMENT & PLAN NOTE
Already home oxygen dependent.  Respiratory failure probably multifactorial.  RLL opacity on CXR.  Probable pneumonia.  Initially started on Vanc and Zosyn.  Change to Levaquin.  Leukocytosis probably exacerbated by steroids and CML.  Currently afebrile.  Acute on chronic combined systolic/diastolic CHF.  EF of 35%.  Started on IV Lasix bid.  Change to daily.  COPD exacerbation.  Needs to stop smoking.  Will provide nicotine patch.  Nebs and Prednisone.  Initially required Bi-pap- now very comfortable on NC.   Possibly home tomorrow.

## 2019-03-09 NOTE — PLAN OF CARE
Problem: Physical Therapy Goal  Goal: Physical Therapy Goal  Outcome: Outcome(s) achieved Date Met: 03/09/19    Skilled PT orders received and initial PT Evaluation completed.      Recommended D/C to Home.    Recommended DME's = rollator ( - as per patient request)    Ranjith Read, PT   03/09/2019

## 2019-03-09 NOTE — PLAN OF CARE
03/09/19 1151   Discharge Assessment   Confirmed/corrected address and phone number on facesheet? Yes   Assessment information obtained from? Patient   Prior to hospitilization cognitive status: Alert/Oriented   Prior to hospitalization functional status: Independent   Current cognitive status: Alert/Oriented   Current Functional Status: Independent   Lives With other relative(s)   Able to Return to Prior Arrangements yes   Who are your caregiver(s) and their phone number(s)? Irma San  daughter    Patient's perception of discharge disposition admitted as an inpatient   Readmission Within the Last 30 Days no previous admission in last 30 days   Patient currently being followed by outpatient case management? No   Patient currently receives any other outside agency services? No   Equipment Currently Used at Home cane, quad   Do you have any problems affording any of your prescribed medications? No   Is the patient taking medications as prescribed? yes   Does the patient have transportation home? Yes   Transportation Anticipated family or friend will provide   Does the patient receive services at the [facility]? No   Discharge Plan A Home with family   Discharge Plan B Home with family   DME Needed Upon Discharge  cane, quad   Patient/Family in Agreement with Plan yes

## 2019-03-09 NOTE — PLAN OF CARE
Problem: Fall Injury Risk  Goal: Absence of Fall and Fall-Related Injury    Intervention: Identify and Manage Contributors to Fall Injury Risk   03/09/19 1417   Manage Acute Allergic Reaction   Medication Review/Management medications reviewed   Identify and Manage Contributors to Fall Injury Risk   Self-Care Promotion BADL personal objects within reach;meal setup provided     Intervention: Promote Injury-Free Environment   03/09/19 1417   Optimize [place] and Functional Mobility   Environmental Safety Modification assistive device/personal items within reach;clutter free environment maintained;room near unit station   Optimize Balance and Safe Activity   Safety Promotion/Fall Prevention bed alarm refused;nonskid shoes/socks when out of bed;medications reviewed

## 2019-03-09 NOTE — NURSING
Note that patient up, out of bed again and asking to go in hallway;  Easily redirected back to bed;     Informed patient that I will walk with her later this afternoon;     Will continue to f/u;

## 2019-03-09 NOTE — NURSING
"Note that patient awake, alert and fully oriented asking,"can you give me a cigarette or do you know somebody who can sell me a cigarette?"   Patient reminded about smoking cessation education;     Note that patient agitated, walking the hallway, "stating I can walk if I want to." Patient redirected back to her room;     Patient now on RA;  Lung sounds diminished;    Charge RN Arnav called security to monitor situation;     Will continue to f/u;    "

## 2019-03-09 NOTE — NURSING
Arrived on unit on stretcher asleep arouses easily.Placed on 3 L oxygen /NC.Bed low call light in reach .educated  on safety.

## 2019-03-09 NOTE — SUBJECTIVE & OBJECTIVE
Interval History: Feeling better.    Review of Systems   HENT: Negative for ear discharge and ear pain.    Eyes: Negative for pain and itching.   Endocrine: Negative for polyphagia and polyuria.   Neurological: Negative for seizures and syncope.     Objective:     Vital Signs (Most Recent):  Temp: 97.2 °F (36.2 °C) (03/09/19 0754)  Pulse: 68 (03/09/19 0754)  Resp: 16 (03/09/19 0754)  BP: (!) 118/55 (03/09/19 0754)  SpO2: 99 % (03/09/19 0754) Vital Signs (24h Range):  Temp:  [97.2 °F (36.2 °C)-98.3 °F (36.8 °C)] 97.2 °F (36.2 °C)  Pulse:  [60-79] 68  Resp:  [16-18] 16  SpO2:  [97 %-100 %] 99 %  BP: (112-144)/(6-66) 118/55     Weight: 60.4 kg (133 lb 2.5 oz)  Body mass index is 22.86 kg/m².    Intake/Output Summary (Last 24 hours) at 3/9/2019 1006  Last data filed at 3/9/2019 0210  Gross per 24 hour   Intake 350 ml   Output 500 ml   Net -150 ml      Physical Exam   Constitutional: She is oriented to person, place, and time. She appears well-developed and well-nourished.   HENT:   Head: Normocephalic and atraumatic.   Eyes: Conjunctivae are normal. Right eye exhibits no discharge. Left eye exhibits no discharge.   Neck: Neck supple. No tracheal deviation present.   Cardiovascular: Normal rate. Exam reveals no gallop and no friction rub.   Pulmonary/Chest: Effort normal. No respiratory distress.   Decreased BS bilaterally   Abdominal: Soft. Bowel sounds are normal. She exhibits no distension. There is no tenderness. There is no guarding.   Musculoskeletal: She exhibits no deformity.   Neurological: She is alert and oriented to person, place, and time.   Skin: Skin is warm and dry.   Nursing note and vitals reviewed.      Significant Labs:   BMP:   Recent Labs   Lab 03/09/19  0551   *      K 3.9   CL 99   CO2 30*   BUN 21   CREATININE 1.0   CALCIUM 9.3     CBC:   Recent Labs   Lab 03/07/19  2150 03/08/19  0912 03/09/19  0551   WBC 22.00* 17.33* 18.42*   HGB 10.6* 10.5* 9.5*   HCT 33.7* 34.4* 30.7*   *  642* 667*       Significant Imaging: I have reviewed all pertinent imaging results/findings within the past 24 hours.

## 2019-03-09 NOTE — PLAN OF CARE
Problem: Fall Injury Risk  Goal: Absence of Fall and Fall-Related Injury  Outcome: Ongoing (interventions implemented as appropriate)  Intervention: Identify and Manage Contributors to Fall Injury Risk   03/09/19 0321   Manage Acute Allergic Reaction   Medication Review/Management medications reviewed   Identify and Manage Contributors to Fall Injury Risk   Self-Care Promotion independence encouraged;BADL personal objects within reach;BADL personal routines maintained         Problem: Adult Inpatient Plan of Care  Goal: Plan of Care Review  Outcome: Ongoing (interventions implemented as appropriate)   03/09/19 0321   Plan of Care Review   Plan of Care Reviewed With patient   Progress improving       Problem: Infection (Pneumonia)  Goal: Resolution of Infection Signs/Symptoms  Outcome: Ongoing (interventions implemented as appropriate)  Intervention: Prevent Infection Progression   03/09/19 0321   Prevent or Manage Infection   Fever Reduction/Comfort Measures lightweight bedding;lightweight clothing;medication administered   Infection Management aseptic technique maintained         Problem: Diabetes Comorbidity  Goal: Blood Glucose Level Within Desired Range  Outcome: Ongoing (interventions implemented as appropriate)  Intervention: Maintain Glycemic Control   03/09/19 0321   Monitor and Manage Ketoacidosis   Glycemic Management blood glucose monito   03/09/19 0321   Monitor and Manage Ketoacidosis   Glycemic Management blood glucose monitoring   ring         Problem: Anxiety  Goal: Anxiety Reduction or Resolution  Outcome: Ongoing (interventions implemented as appropriate)  Intervention: Promote Anxiety Reduction   03/09/19 0321   Promote Anxiety Reduction   Supportive Measures active listening utilized;relaxation techniques promoted   Family/Support System Care involvement promoted         Problem: Skin Injury Risk Increased  Goal: Skin Health and Integrity  Outcome: Ongoing (interventions implemented as  appropriate)  Intervention: Optimize Skin Protection   03/09/19 0321   Prevent Additional Skin Injury   Head of Bed (HOB) HOB at 30-45 degrees   Pressure Reduction Devices pressure-redistributing mattress utilized   Pressure Reduction Techniques frequent weight shift encouraged   Monitor and Manage Hypervolemia   Skin Protection incontinence pads utilized

## 2019-03-09 NOTE — PROGRESS NOTES
[facility] Hospital Medicine  Progress Note    Patient Name: Susan Howell  MRN: [id number]  Patient Class: IP- Inpatient   Admission Date: 3/7/2019  Length of Stay: 1 days  Attending Physician: Edin Anglin MD  Primary Care Provider: Linda Mckee MD        Subjective:     Principal Problem:Acute on chronic respiratory failure with hypoxia    HPI:  Mrs. Howell is a 73 yo F who presents from home on 3/7/19 with a CC of SOB.  It is very difficult to get a good history from the patient. She is very vague and does not seem interested to talk.  Most of history was obtained by reviewing the ED record. Apparently, she called EMS and said she could not breath. She was low 80's on her sats when EMS arrived.  The patient of note, is on home 02 at 2.5L.  The patient was found in the ED to have a RLL infiltrate, evidence of CHF by BNP. She was started on Lasix and Abx with good results. Initially, she was going to go to the ICU- but large clinical improvement in the ED and was downgraded to Tele. This patient lives by herself and uses both a cane and walker.    Hospital Course:  73 y/o female presented with acute on chronic respiratory failure.  Probably multifactorial.  Noted to have RLL opacity on Xray.  Probably pneumonia and started on IV Vanc and Zosyn.  This was switched to Levaquin.  Also with acute on chronic combined systolic/diastolic CHF and started on IV Lasix.  EF of 35%.  Patient already home oxygen dependent.  Long history of COPD with acute exacerbation.  Continues to smoke.  Started on nebs and Prednisone.      Interval History: Feeling better.    Review of Systems   HENT: Negative for ear discharge and ear pain.    Eyes: Negative for pain and itching.   Endocrine: Negative for polyphagia and polyuria.   Neurological: Negative for seizures and syncope.     Objective:     Vital Signs (Most Recent):  Temp: 97.2 °F (36.2 °C) (03/09/19 0754)  Pulse: 68 (03/09/19 0754)  Resp: 16 (03/09/19  0754)  BP: (!) 118/55 (03/09/19 0754)  SpO2: 99 % (03/09/19 0754) Vital Signs (24h Range):  Temp:  [97.2 °F (36.2 °C)-98.3 °F (36.8 °C)] 97.2 °F (36.2 °C)  Pulse:  [60-79] 68  Resp:  [16-18] 16  SpO2:  [97 %-100 %] 99 %  BP: (112-144)/(6-66) 118/55     Weight: 60.4 kg (133 lb 2.5 oz)  Body mass index is 22.86 kg/m².    Intake/Output Summary (Last 24 hours) at 3/9/2019 1006  Last data filed at 3/9/2019 0210  Gross per 24 hour   Intake 350 ml   Output 500 ml   Net -150 ml      Physical Exam   Constitutional: She is oriented to person, place, and time. She appears well-developed and well-nourished.   HENT:   Head: Normocephalic and atraumatic.   Eyes: Conjunctivae are normal. Right eye exhibits no discharge. Left eye exhibits no discharge.   Neck: Neck supple. No tracheal deviation present.   Cardiovascular: Normal rate. Exam reveals no gallop and no friction rub.   Pulmonary/Chest: Effort normal. No respiratory distress.   Decreased BS bilaterally   Abdominal: Soft. Bowel sounds are normal. She exhibits no distension. There is no tenderness. There is no guarding.   Musculoskeletal: She exhibits no deformity.   Neurological: She is alert and oriented to person, place, and time.   Skin: Skin is warm and dry.   Nursing note and vitals reviewed.      Significant Labs:   BMP:   Recent Labs   Lab 03/09/19  0551   *      K 3.9   CL 99   CO2 30*   BUN 21   CREATININE 1.0   CALCIUM 9.3     CBC:   Recent Labs   Lab 03/07/19  2150 03/08/19  0912 03/09/19  0551   WBC 22.00* 17.33* 18.42*   HGB 10.6* 10.5* 9.5*   HCT 33.7* 34.4* 30.7*   * 642* 667*       Significant Imaging: I have reviewed all pertinent imaging results/findings within the past 24 hours.    Assessment/Plan:      * Acute on chronic respiratory failure with hypoxia    Already home oxygen dependent.  Respiratory failure probably multifactorial.  RLL opacity on CXR.  Probable pneumonia.  Initially started on Vanc and Zosyn.  Change to Levaquin.   Leukocytosis probably exacerbated by steroids and CML.  Currently afebrile.  Acute on chronic combined systolic/diastolic CHF.  EF of 35%.  Started on IV Lasix bid.  Change to daily.  COPD exacerbation.  Needs to stop smoking.  Will provide nicotine patch.  Nebs and Prednisone.  Initially required Bi-pap- now very comfortable on NC.   Possibly home tomorrow.       Debility    Uses walker and cane at home.  Will order PT/OT eval for 3/9       Pneumonia of lower lobe due to infectious organism           Essential hypertension    Resume home meds.       Acute on chronic combined systolic and diastolic congestive heart failure    As above.       CML (chronic myelocytic leukemia)    Likely cause of leukocytosis.       GERD (gastroesophageal reflux disease)    Resume home meds        COPD exacerbation    No Wheezing. Will consider Prednisone        Mild protein malnutrition           Anemia of chronic disease    H/H similar to previous labs.  No evidence of bleeding.       Hyperlipidemia    Continue Statin.       History of CVA (cerebrovascular accident)    No acute issues        Epilepsy    Continue home meds.       Elevated troponin    Flat pattern- doubt ACS        CAD s/p PCI    Continue ASA and Plavix.       Tobacco abuse    Smoking cessation education > 3 minutes.        Emphysema of lung             VTE Risk Mitigation (From admission, onward)        Ordered     enoxaparin injection 40 mg  Daily      03/08/19 1232     IP VTE HIGH RISK PATIENT  Once      03/08/19 1752              Edin Anglin MD  Department of Hospital Medicine   [facility]

## 2019-03-10 PROBLEM — I50.43 ACUTE ON CHRONIC COMBINED SYSTOLIC AND DIASTOLIC CONGESTIVE HEART FAILURE: Status: RESOLVED | Noted: 2019-01-01 | Resolved: 2019-01-01

## 2019-03-10 PROBLEM — J96.21 ACUTE ON CHRONIC RESPIRATORY FAILURE WITH HYPOXIA: Status: RESOLVED | Noted: 2019-01-01 | Resolved: 2019-01-01

## 2019-03-10 NOTE — PLAN OF CARE
Problem: Anxiety  Goal: Anxiety Reduction or Resolution    Intervention: Promote Anxiety Reduction  Patient rested well after anxiety medicine given. Stated she felt much better this morning

## 2019-03-10 NOTE — NURSING
Discharge Preparation:     Note that patient awake, alert and fully oriented; Patient is fully dressed and awaiting rolling walker to arrive;     Case Management coordination complete;    IV (two) and telemetry monitor removed; No bleeding observed from IV sites;  Patient denies pain at this time;      Reviewed discharge plan with patient and she voiced understanding of same;  D/C papers placed in blue folder;     Awaiting DME (rolling walker);     Note Charge GWEN Daniel arranged cab service to transport patient home to [place], [place].     Will continue to f/u;

## 2019-03-10 NOTE — PLAN OF CARE
Patient cleared by Case Management for Discharge.    Awaiting rollator to be delivered by [facility]. Nurse Jackson notified.

## 2019-03-10 NOTE — NURSING
Patient restless and unable to sleep. Refusing BIPAP at this time. Given med for anxiety and restlessness.Patient denies pain

## 2019-03-10 NOTE — DISCHARGE SUMMARY
[facility] Medicine  Discharge Summary      Patient Name: Susan Howell  MRN: [id number]  Admission Date: 3/7/2019  Hospital Length of Stay: 2 days  Discharge Date and Time:  03/10/2019 9:31 AM  Attending Physician: Edin Anglin MD   Discharging Provider: Edin Anglin MD  Primary Care Provider: Linda Mckee MD      HPI:   Mrs. Hoewll is a 75 yo F who presents from home on 3/7/19 with a CC of SOB.  It is very difficult to get a good history from the patient. She is very vague and does not seem interested to talk.  Most of history was obtained by reviewing the ED record. Apparently, she called EMS and said she could not breath. She was low 80's on her sats when EMS arrived.  The patient of note, is on home 02 at 2.5L.  The patient was found in the ED to have a RLL infiltrate, evidence of CHF by BNP. She was started on Lasix and Abx with good results. Initially, she was going to go to the ICU- but large clinical improvement in the ED and was downgraded to Tele. This patient lives by herself and uses both a cane and walker.    * No surgery found *      Hospital Course:   75 y/o female presented with acute on chronic respiratory failure.  Probably multifactorial.  Noted to have RLL opacity on Xray.  Probable pneumonia and started on IV Vanc and Zosyn.  This was switched to Levaquin.  Also with acute on chronic combined systolic/diastolic CHF and started on IV Lasix.  EF of 35%.  Patient already home oxygen dependent.  Long history of COPD with acute exacerbation.  Continues to smoke.  Started on nebs and Prednisone.  Patient did well during hospital stay.  She remained afebrile and hemodynamically stable.  She is currently at her baseline.  Patient counseled on importance of smoking cessation and provided Nicotine patch.  Will also increase her Lasix to 40 mg bid upon discharge.  Doubt compliance with diet.  Patient is now ready for discharge home.  She will be discharged with 5 more  days of oral Levaquin and a few more days of oral Prednisone.  She will follow up with PCP.       Consults:   Consults (From admission, onward)        Status Ordering Provider     IP consult to case management  Once     Provider:  (Not yet assigned)    Acknowledged TRUNG LUNA          No new Assessment & Plan notes have been filed under this hospital service since the last note was generated.  Service: Hospital Medicine    Final Active Diagnoses:    Diagnosis Date Noted POA    Pneumonia of lower lobe due to infectious organism [J18.1] 03/08/2019 Yes    Debility [R53.81] 03/08/2019 Yes    Essential hypertension [I10] 02/04/2019 Yes    CML (chronic myelocytic leukemia) [C92.10] 03/28/2018 Yes    GERD (gastroesophageal reflux disease) [K21.9] 12/09/2014 Yes     Chronic    Anemia of chronic disease [D63.8] 11/04/2014 Yes     Chronic    Mild protein malnutrition [E44.1] 11/04/2014 Yes     Chronic    Hyperlipidemia [E78.5] 10/27/2014 Yes     Chronic    Epilepsy [G40.909] 08/19/2014 Yes     Chronic    History of CVA (cerebrovascular accident) [Z86.73] 08/19/2014 Not Applicable     Chronic    CAD s/p PCI [I25.10] 06/07/2014 Yes     Chronic    Tobacco abuse [Z72.0] 06/07/2014 Yes     Chronic    Emphysema of lung [J43.9]  Yes      Problems Resolved During this Admission:    Diagnosis Date Noted Date Resolved POA    PRINCIPAL PROBLEM:  Acute on chronic respiratory failure with hypoxia [J96.21] 02/04/2019 03/10/2019 Yes    Acute on chronic combined systolic and diastolic congestive heart failure [I50.43] 02/04/2019 03/10/2019 Yes    COPD exacerbation [J44.1] 12/09/2014 03/10/2019 Yes    Elevated troponin [R74.8] 08/19/2014 03/10/2019 Yes       Discharged Condition: stable    Disposition: Home or Self Care    Follow Up:  Follow-up Information     Linda Mckee MD In 1 week.    Specialty:  General Practice  Contact information:  [address] [place] [address]  [address]  [place]  "[id number]  [phone]                 Patient Instructions:      HME - OTHER     Order Specific Question Answer Comments   Type of Equipment: Rollator    Height: 5' 4" (1.626 m)    Weight: 62.4 kg (137 lb 9.1 oz)    Does patient have medical equipment at home? shower chair - 2.0 Lpm of O2 via N.C. (as needed). / - 2.0 Lpm of O2 via N.C. (as needed). / - 2.0 Lpm of O2 via N.C. (as needed).   Does patient have medical equipment at home? cane, straight    Does patient have medical equipment at home? oxygen      Diet diabetic     Diet Cardiac     Notify your health care provider if you experience any of the following:  temperature >100.4     Notify your health care provider if you experience any of the following:  persistent nausea and vomiting or diarrhea     Notify your health care provider if you experience any of the following:  severe uncontrolled pain     Notify your health care provider if you experience any of the following:  difficulty breathing or increased cough     Notify your health care provider if you experience any of the following:  persistent dizziness, light-headedness, or visual disturbances     Notify your health care provider if you experience any of the following:  increased confusion or weakness     Activity as tolerated         Pending Diagnostic Studies:     None         Medications:  Reconciled Home Medications:      Medication List      START taking these medications    acetaminophen 325 MG tablet  Commonly known as:  TYLENOL  Take 2 tablets (650 mg total) by mouth every 4 (four) hours as needed for Pain or Temperature greater than (100).     levoFLOXacin 500 MG tablet  Commonly known as:  LEVAQUIN  Take 1 tablet (500 mg total) by mouth once daily. for 5 days     nicotine 21 mg/24 hr  Commonly known as:  NICODERM CQ  Place 1 patch onto the skin once daily.     predniSONE 20 MG tablet  Commonly known as:  DELTASONE  Take 2 tablets (40 mg total) by mouth once daily. for 4 days        CHANGE how " "you take these medications    furosemide 40 MG tablet  Commonly known as:  LASIX  Take 1 tablet (40 mg total) by mouth 2 (two) times daily.  What changed:  when to take this        CONTINUE taking these medications    ADVAIR DISKUS 100-50 mcg/dose diskus inhaler  Generic drug:  fluticasone-salmeterol 100-50 mcg/dose  Take 1 puff by mouth Twice daily.     amLODIPine 10 MG tablet  Commonly known as:  NORVASC  Take 1 tablet (10 mg total) by mouth once daily.     ANORO ELLIPTA 62.5-25 mcg/actuation Dsdv  Generic drug:  umeclidinium-vilanterol     aspirin 81 MG EC tablet  Commonly known as:  ECOTRIN  Take 81 mg by mouth once daily.     atorvastatin 80 MG tablet  Commonly known as:  LIPITOR  TK 1 T PO D     BD ULTRA-FINE ANISHA PEN NEEDLE 32 gauge x 5/32" Ndle  Generic drug:  pen needle, diabetic  use for insulin up to FOUR TIMES DAILY     BIDIL 20-37.5 mg Tab  Generic drug:  isosorbide-hydrALAZINE 20-37.5 mg  TK 1 T PO TID     CALCIUM 500 + D 500 mg(1,250mg) -200 unit per tablet  Generic drug:  calcium-vitamin D3  Take 1 tablet by mouth 2 (two) times daily with meals.     cloNIDine 0.1 MG tablet  Commonly known as:  CATAPRES  Take 1 tablet (0.1 mg total) by mouth 2 (two) times daily.     clopidogrel 75 mg tablet  Commonly known as:  PLAVIX  TK 1 T PO D     CRESTOR 20 MG tablet  Generic drug:  rosuvastatin  Take 10 mg by mouth every evening.     ergocalciferol 50,000 unit Cap  Commonly known as:  ERGOCALCIFEROL  Take 50,000 Units by mouth every 7 days.     famotidine 40 MG tablet  Commonly known as:  PEPCID     gabapentin 300 MG capsule  Commonly known as:  NEURONTIN     HYDROcodone-acetaminophen 5-325 mg per tablet  Commonly known as:  NORCO  Take 1 tablet by mouth 3 (three) times daily as needed.     imatinib 400 MG Tab  Commonly known as:  GLEEVEC  Take 1 tablet (400 mg total) by mouth once daily.     insulin glargine 100 unit/mL injection  Commonly known as:  LANTUS  Inject 18 Units into the skin every evening.   "   isosorbide mononitrate 30 MG 24 hr tablet  Commonly known as:  IMDUR  Take 1 tablet (30 mg total) by mouth once daily.     levETIRAcetam 500 MG Tab  Commonly known as:  KEPPRA  Take 2 tablets by mouth Twice daily.     lisinopril 40 MG tablet  Commonly known as:  PRINIVIL,ZESTRIL  Take 1 tablet (40 mg total) by mouth once daily.     metoprolol tartrate 25 MG tablet  Commonly known as:  LOPRESSOR  TK 1 T PO BID     nitroGLYCERIN 0.4 MG SL tablet  Commonly known as:  NITROSTAT  Place 1 tablet (0.4 mg total) under the tongue every 5 (five) minutes as needed for Chest pain.     prednisoLONE acetate 1 % Drps  Commonly known as:  PRED FORTE     ranitidine 300 MG tablet  Commonly known as:  ZANTAC  TK 1 T PO QHS     ranolazine 500 MG Tb12  Commonly known as:  RANEXA  Take 500 mg by mouth 2 (two) times daily.     tiotropium 18 mcg inhalation capsule  Commonly known as:  SPIRIVA  Inhale 18 mcg into the lungs once daily.     VENTOLIN HFA 90 mcg/actuation inhaler  Generic drug:  albuterol  Inhale 2 puffs into the lungs every 4 to 6 hours as needed.            Indwelling Lines/Drains at time of discharge:   Lines/Drains/Airways          None          Time spent on the discharge of patient: >30 minutes  Patient was seen and examined on the date of discharge and determined to be suitable for discharge.         Edin Anglin MD  Department of Hospital Medicine  [facility]

## 2019-03-10 NOTE — DISCHARGE INSTRUCTIONS
How to Quit Smoking  Smoking is one of the hardest habits to break. About half of all people who have ever smoked have been able to quit. Most people who still smoke want to quit. Here are some of the best ways to stop smoking.    Keep trying  Most smokers make many attempts at quitting before they are successful. Its important not to give up.  Go cold turkey  Most former smokers quit cold turkey (all at once). Trying to cut back gradually doesn't seem to work as well, perhaps because it continues the smoking habit. Also, it is possible to inhale more while smoking fewer cigarettes. This results in the same amount of nicotine in your body.  Get support  Support programs can be a big help, especially for heavy smokers. These groups offer lectures, ways to change behavior, and peer support. Here are some ways to find a support program:  · Free national quitline: 800-QUIT-NOW (493-562-8269).  · Hospital quit-smoking programs.  · American Lung Association: (324.261.5398).  · American Cancer Society (676-834-2105).  Support at home is important too. Nonsmokers can offer praise and encouragement. If the smoker in your life finds it hard to quit, encourage them to keep trying.  Over-the-counter medicines  Nicotine replacement therapy may make quitting easier. Certain aids, such as the nicotine patch, gum, and lozenges, are available without a prescription. It is best to use these under a doctors care, though. The skin patch provides a steady supply of nicotine. Nicotine gum and lozenges give temporary bursts of low levels of nicotine. Both methods reduce the craving for cigarettes. Warning: If you have nausea, vomiting, dizziness, weakness, or a fast heartbeat, stop using these products and see your doctor.  Prescription medicines  After reviewing your smoking patterns and past attempts to quit, your doctor may offer a prescription medicine such as bupropion, varenicline, a nicotine inhaler, or nasal spray. Each has  "advantages and side effects. Your doctor can review these with you.  Health benefits of quitting  The benefits of quitting start right away and keep improving the longer you go without smoking. These benefits occur at any age.  So whether you are 17 or 70, quitting is a good decision. Some of the benefits include:  · 20 minutes: Blood pressure and pulse return to normal.  · 8 hours: Oxygen levels return to normal.  · 2 days: Ability to smell and taste begin to improve as damaged nerves regrow.  · 2 to 3 weeks: Circulation and lung function improve.  · 1 to 9 months: Coughing, congestion, and shortness of breath decrease; tiredness decreases.  · 1 year: Risk of heart attack decreases by half.  · 5 years: Risk of lung cancer decreases by half; risk of stroke becomes the same as a nonsmokers.  For more on how to quit smoking, try these online resources:   · [employer]  · "Clearing the Air" booklet from the National Cancer [place]: [employer].gov/sites/default/files/pdf/clearing-the-air-accessible.pdf  Date Last Reviewed: 3/1/2017  © 6437-9528 [employer]. [address] [place] [address]. All rights reserved. This information is not intended as a substitute for professional medical care. Always follow your healthcare professional's instructions.      Left-Sided Congestive Heart Failure (CHF)    The heart is a large muscle that pumps blood throughout the body. Blood carries oxygen to all the organs (including the brain), muscles, and skin of your body. After the body takes the oxygen out of the blood, the blood returns to the heart. The right side of the heart collects that blood and pumps it to the lungs to receive fresh oxygen. This oxygen-rich blood from the lungs then returns to the left side of the heart, where it is pumped back out to the brain and rest of the body, starting the process all over.   Heart failure occurs when the heart muscle is weakened. This can occur after a heart " attack or with significant coronary artery disease. This affects the pumping action of the heart.   When the left side of the heart is weakened, it cant handle the blood it is getting from the lungs. Pressure then builds up in the veins of the lungs, causing fluid to leak into the lung tissues. This may be referred to as congestive heart failure. This causes you to feel short of breath, weak, or dizzy. These symptoms are often worse with exertion, such as climbing stairs or walking up hills. Lying flat is uncomfortable and can make your breathing worse. This may make sleeping difficult and force you to use extra pillows to elevate your upper body to help you sleep well.  Causes of heart failure include:  · Coronary artery disease  · Heart attack in the past (also known as acute myocardial infarction, or AMI)  · High blood pressure  · Damaged heart valve  · Diabetes  · Obesity  · Cigarette smoking  · Alcohol abuse  Treatment  Heart failure is a chronic condition. There is no cure. The purpose of medical treatment is to improve the pumping action of the heart, and remove excess water and fluids from the body. A number of medicines can help reach this goal, improve symptoms and keep the heart from becoming weaker. In some cases of severe heart failure, a mechanical device will be placed in the heart to help the heart pump. Another major goal is to better treat the causes of heart failure, such as diabetes, high blood pressure, and your lifestyle.  Home care  · Check your weight every day. A sudden increase in weight gain could mean worsening heart failure.  ¨ Use the same scale every day.  ¨ Weigh yourself at the same time every day.  ¨ Make sure the scale is on the floor, not on a rug.  ¨ Keep a record of your weight every day, so your healthcare provider can see it. If you are not given a log sheet for this, keep a separate journal for this purpose.   · Cut back on how much salt (sodium) you eat:  ¨ Your provider  will tell you how much salt to have daily, usually 2,000 mg or less.  ¨ Avoid high-salt foods. These include olives, pickles, smoked meats, processed foods, and salted potato chips.  ¨ Don't add salt to your food at the table. Use only small amounts of salt when cooking.  ¨ Avoid binging on salt-heavy meals.  · Follow your healthcare provider's recommendations about how much fluid you should have.  · Stop smoking.  · Cut back on the amount of alcohol you drink.  · Lose weight if you are overweight. The excess weight adds a lot of stress on the workload of the heart.  · Stay active. Talk to your provider about an exercise program that is safe for your heart.  · Keep your feet elevated to reduce swelling. Ask your provider about support hose as a preventive treatment for daytime leg swelling.  · Follow your healthcare provider's instructions closely.  Besides taking your medicine as instructed, an important part of treatment includes lifestyle changes. These include diet, physical activity, stopping smoking, and weight control.  Improve your diet. Often in the hospital, people are given a heart healthy diet. This includes more fresh foods, lower saturated fat, less processed foods, and lower salt.  Follow-up care  Follow up with your healthcare provider, or as advised. Make sure to keep any appointments that were made for you. This can help better control heart failure.  If an X-ray was done, you will be told of any new findings that may affect your care.  Call 911  Call 911 if you:  · Become severely short of breath  · Feel lightheaded, or feel like you might pass out or faint  · Have chest pain or discomfort that is different than usual, the medicines your provider told you to use for this don't help, or the pain lasts longer than 10 to 15 minutes  · Develop a rapid heart rate suddenly  When to seek medical advice  Call your healthcare provider right away if you have any of these signs of worsening heart  failure:  · Sudden weight gain --  more than 2 pounds in 1 day, or 5 pounds in 1 week, or whatever weight gain you were told to report by your provider  · Trouble breathing not related to being active  · New or increased swelling of your legs or ankles  · Swelling or pain in your abdomen  · Breathing trouble at night, waking up short of breath or needing more pillows to elevate your upper body to help you breathe  · Frequent coughing that doesnt go away  · Feeling much more tired than usual  Date Last Reviewed: 1/4/2016 © 2000-2017 [employer]. [address] [place] [address], [place] [address]. All rights reserved. This information is not intended as a substitute for professional medical care. Always follow your healthcare professional's instructions.      Heart Failure: Making Changes to Your Diet  You have a condition called heart failure. When you have heart failure, excess fluid is more likely to build up in your body because your heart isn't working well. This makes the heart work harder to pump blood. Fluid buildup causes symptoms such as shortness of breath and swelling (edema). This is often referred to as congestive heart failure or CHF. Controlling the amount of salt (sodium) you eat may help stop fluid from building up. Your doctor may also tell you to reduce the amount of fluid you drink.  Reading food labels    Your healthcare provider will tell you how much sodium you can eat each day. Read food labels to keep track. Keep in mind that certain foods are high in salt. These include canned, frozen, and processed foods. Check the amount of sodium in each serving. Watch out for high-sodium ingredients. These include MSG (monosodium glutamate), baking soda, and sodium phosphate.   Eating less salt  Give yourself time to get used to eating less salt. It may take a little while. Here are some tips to help:  · Take the saltshaker off the table. Replace it with salt-free herb mixes and spices.  · Eat  fresh or plain frozen vegetables. These have much less salt than canned vegetables.  · Choose low-sodium snacks like sodium-free pretzels, crackers, or air-popped popcorn.  · Dont add salt to your food when youre cooking. Instead, season your foods with pepper, lemon, garlic, or onion.  · When you eat out, ask that your food be cooked without added salt.  · Avoid eating fried foods as these often have a great deal of salt.  If youre told to limit fluids  You may need to limit how much fluid you have to help prevent swelling. This includes anything that is liquid at room temperature, such as ice cream and soup. If your doctor tells you to limit fluid, try these tips:  · Measure drinks in a measuring cup before you drink them. This will help you meet daily goals.  · Chill drinks to make them more refreshing.  · Suck on frozen lemon wedges to quench thirst.  · Only drink when youre thirsty.  · Chew sugarless gum or suck on hard candy to keep your mouth moist.  · Weigh yourself daily to know if your body's fluid content is rising.  My sodium goal  Your healthcare provider may give you a sodium goal to meet each day. This includes sodium found in food as well as salt that you add. My goal is to eat no more than ___________ mg of sodium per day.     When to call your doctor  Call your doctor right away if you have any symptoms of worsening heart failure. These can include:  · Sudden weight gain  · Increased swelling of your legs or ankles  · Trouble breathing when youre resting or at night  · Increase in the number of pillows you have to sleep on  · Chest pain, pressure, discomfort, or pain in the jaw, neck, or back   Date Last Reviewed: 3/21/2016  © 4236-1603 [employer]. [address] [place] [address], [place] [address]. All rights reserved. This information is not intended as a substitute for professional medical care. Always follow your healthcare professional's instructions.

## 2019-03-10 NOTE — PLAN OF CARE
TN taught Symptoms and Problems for  Breathing Issues  home care with pt and   with teach back:  TN placed education sheet in [employer]..       TN taught patient about things patient is responsible for when discharged TO HELP WITH RECOVERY:  Particularly on how to Manage Care At Home:  1. Getting his prescriptions filled.  2. Taking his medications as directed. DO NOT MISS ANY DOSES!  3. Going to his follow-up doctor appointments.     Follow-up Information     Linda Mckee MD. Call in 1 day.    Specialty:  General Practice  Why:  Call on Monday 3/11 to make appointment  Contact information:  [address] [place], [place] [address]  [id number]  [place] [address]  [phone]                 [facility] will call you on Monday to set up services.    ASAD Ruff, RN

## 2019-03-10 NOTE — PLAN OF CARE
03/10/19 1610   Final Note   Assessment Type Final Discharge Note   Anticipated Discharge Disposition Home   What phone number can be called within the next 1-3 days to see how you are doing after discharge? [phone]   Hospital Follow Up  Appt(s) scheduled? Yes   Discharge plans and expectations educations in teach back method with documentation complete? Yes

## 2019-03-12 NOTE — PATIENT INSTRUCTIONS
Sepsis  Sepsis occurs when your body responds to bacteremia - the presence of bacteria in your bloodstream. Sepsis can be deadly. Blood pressure may drop and the lungs and kidneys may start to fail. Emergency care for sepsis is crucial.  Risk Factors  Those most at risk for sepsis are:  Infants or older adults  People who have an illness such as cancer, AIDS, or diabetes  People being treated with chemotherapy medications or radiation  People who have had a transplant  People with an infection such as pneumonia, meningitis, or a urinary tract infection  When to Go to the Emergency Department (ED)  Sepsis is a medical emergency. Go to the nearest emergency department if a fever is present with any of these symptoms:  Chills and shaking  Fever or low body temperature (hypothermia)  Rapid heartbeat and breathing; shortness of breath  Nausea or vomiting  Confusion, dizziness  Skin rash  Decreased urination  What to Expect in the ED  Blood and urine tests are done to look for the presence of bacteria.  A blood culture may be done. In this test, a blood sample is sent to a lab, where its placed in a special container. Any bacteria in the blood should grow in 24 hours.  X-rays may be taken or other imaging tests may be done.  A person with sepsis will be admitted to the hospital and treated with antibiotics. Treatment may also include oxygen and intravenous fluids.  © 5682-0512 [facility], [address] [place] [address], [place] [address]. All rights reserved. This information is not intended as a substitute for professional medical care. Always follow your healthcare professional's instructions.

## 2019-03-13 NOTE — PROGRESS NOTES
C3 nurse contacted [facility] to check on lasix. Pt unable to obtain until  March 28th as she received lasix from [facility] in mid February.    Contacted pt to inform of above. Verbalized understanding and pt reporting she does have lasix.    C[facility] but they were closed.  Pt reporting no one has contacted her. Pt will call [facility] in morning. Verbalized understanding.

## 2019-03-20 NOTE — PROGRESS NOTES
"Chief Complaint :  CML.    Hx of Present illness :  Patient is a 74 y.o. year old female who presents to the clinic today for  Hematology followup.  On Gleevec   Appetite fair; Has Urinary  Incontinence. No more loose stools.   Has Dyspnea related to COPD. Under Care of Pulmonologist .  Presently on Gleevec 400 Mg daily.  Last office visit 6 months ago,.  Patient insists she has been takimng her gleevec regularly      Allergies :    Review of patient's allergies indicates:   Allergen Reactions    Morphine      Pt states, "I'm not allergic to morphine they gave me too much at [facility]. Instead of giving me 2mg she gave me 5mg and I was almost dead."     Codeine      Blisters vs sweling (pt unsure which one)    Iodine containing multivitamin      Blisters vs swelling (pt unsure which one.)    Lidocaine        Occupation :  Homemaker    Transfusion :  None    Menstrual & obstetric Hx :  6, Para 6  Age of menarche: 13  Age of first pregnancy: 18  Lactation history: No  Age of menopause: Hysterectomy at age 33, for Fibroids and Bleeding  HRT:  None    Present Meds :  Reviewed  Medication List with Changes/Refills   Current Medications    ACETAMINOPHEN (TYLENOL) 325 MG TABLET    Take 2 tablets (650 mg total) by mouth every 4 (four) hours as needed for Pain or Temperature greater than (100).    ADVAIR DISKUS 100-50 MCG/DOSE DISKUS INHALER    Take 1 puff by mouth Twice daily.    AMLODIPINE (NORVASC) 10 MG TABLET    Take 1 tablet (10 mg total) by mouth once daily.    ANORO ELLIPTA 62.5-25 MCG/ACTUATION DSDV        ASPIRIN (ECOTRIN) 81 MG EC TABLET    Take 81 mg by mouth once daily.      ATORVASTATIN (LIPITOR) 80 MG TABLET    TK 1 T PO D    BD ULTRA-FINE ANISHA PEN NEEDLE 32 GAUGE X " NDLE    use for insulin up to FOUR TIMES DAILY    BIDIL 20-37.5 MG TAB    TK 1 T PO TID    CALCIUM-VITAMIN D (CALCIUM-VITAMIN D) 500 MG(1,250MG) -200 UNIT PER TABLET    Take 1 tablet by mouth 2 (two) times daily with " meals.      CLONIDINE (CATAPRES) 0.1 MG TABLET    Take 1 tablet (0.1 mg total) by mouth 2 (two) times daily.    CLOPIDOGREL (PLAVIX) 75 MG TABLET    TK 1 T PO D    CRESTOR 20 MG TABLET    Take 10 mg by mouth every evening.     ERGOCALCIFEROL (ERGOCALCIFEROL) 50,000 UNIT CAP    Take 50,000 Units by mouth every 7 days.      FAMOTIDINE (PEPCID) 40 MG TABLET        FUROSEMIDE (LASIX) 40 MG TABLET    Take 1 tablet (40 mg total) by mouth 2 (two) times daily.    GABAPENTIN (NEURONTIN) 300 MG CAPSULE        HYDROCODONE-ACETAMINOPHEN 5-325MG (NORCO) 5-325 MG PER TABLET    Take 1 tablet by mouth 3 (three) times daily as needed.     IMATINIB (GLEEVEC) 400 MG TAB    Take 1 tablet (400 mg total) by mouth once daily.    INSULIN GLARGINE (LANTUS) 100 UNIT/ML INJECTION    Inject 18 Units into the skin every evening.    ISOSORBIDE MONONITRATE (IMDUR) 30 MG 24 HR TABLET    Take 1 tablet (30 mg total) by mouth once daily.    LISINOPRIL (PRINIVIL,ZESTRIL) 40 MG TABLET    Take 1 tablet (40 mg total) by mouth once daily.    METOPROLOL TARTRATE (LOPRESSOR) 25 MG TABLET    TK 1 T PO BID    NICOTINE (NICODERM CQ) 21 MG/24 HR    Place 1 patch onto the skin once daily.    NITROGLYCERIN (NITROSTAT) 0.4 MG SL TABLET    Place 1 tablet (0.4 mg total) under the tongue every 5 (five) minutes as needed for Chest pain.    PREDNISOLONE ACETATE (PRED FORTE) 1 % DRPS        RANITIDINE (ZANTAC) 300 MG TABLET    TK 1 T PO QHS    RANOLAZINE (RANEXA) 500 MG TB12    Take 500 mg by mouth 2 (two) times daily.    TIOTROPIUM (SPIRIVA) 18 MCG INHALATION CAPSULE    Inhale 18 mcg into the lungs once daily.      VENTOLIN HFA 90 MCG/ACTUATION HFAA    Inhale 2 puffs into the lungs every 4 to 6 hours as needed.       Past Medical Hx : Known to Have DM, HTN, CML, COPD, Hyperlipidemia    Past Medical Hx :  Past Medical History:   Diagnosis Date    Asthma     Cancer 10/10/2014    Bone cancer     CHF (congestive heart failure)     Cigarette smoker     COPD (chronic  obstructive pulmonary disease)     Diabetes mellitus     Diabetes mellitus type II     Emphysema of lung     [place] (hard of hearing)     Hypercholesteremia     Hypertension     Myocardial infarct 12/25/2013    On home oxygen therapy     Palpitations     Pneumonia 03/07/2019    PVD (peripheral vascular disease)     Seizures     Stroke     Unsteady gait     uses a cane & walker       Travel Hx :  N/A    Immunization :  Immunization History   Administered Date(s) Administered    Influenza - High Dose 11/04/2014       Family Hx :  Family History   Problem Relation Age of Onset    Diabetes Mother     Asthma Mother     Cancer Father     Breast cancer Sister     Colon cancer Neg Hx     Ovarian cancer Neg Hx        Social Hx :  Social History     Socioeconomic History    Marital status:      Spouse name: Not on file    Number of children: Not on file    Years of education: Not on file    Highest education level: Not on file   Social Needs    Financial resource strain: Not on file    Food insecurity - worry: Not on file    Food insecurity - inability: Not on file    Transportation needs - medical: Not on file    Transportation needs - non-medical: Not on file   Occupational History    Not on file   Tobacco Use    Smoking status: Current Every Day Smoker     Packs/day: 0.50     Years: 56.00     Pack years: 28.00     Types: Cigarettes    Smokeless tobacco: Never Used   Substance and Sexual Activity    Alcohol use: No    Drug use: No    Sexual activity: No   Other Topics Concern    Not on file   Social History Narrative    Not on file       Surgery : C. Section.  Hysterectomy. Appendectomy. Cholecystectomy. Colonoscopy Cataract Surgery    Symptoms :   Has Sx related to peripheral vascular insufficiency.    Review of Systems   Constitutional: Positive for malaise/fatigue. Negative for chills, fever and weight loss.        Losing weight.  Hospitalised for pneumonia one week ago.   HENT:  Positive for congestion and hearing loss. Negative for tinnitus.    Eyes: Negative for blurred vision, double vision and photophobia.   Respiratory: Positive for shortness of breath (Related to COPD). Negative for cough, hemoptysis and sputum production.    Cardiovascular: Negative for chest pain, palpitations, orthopnea, claudication, leg swelling and PND.   Gastrointestinal: Positive for nausea. Negative for abdominal pain, blood in stool, constipation, heartburn and vomiting.   Genitourinary: Positive for urgency. Negative for dysuria, flank pain, frequency and hematuria.   Musculoskeletal: Positive for joint pain and neck pain. Negative for back pain, falls and myalgias.   Skin: Negative for itching and rash.        Dryness  Skin Left Shoulder   Neurological: Negative for dizziness, tingling, tremors, sensory change, focal weakness, weakness and headaches.   Endo/Heme/Allergies: Negative for polydipsia. Does not bruise/bleed easily.   Psychiatric/Behavioral: Negative for depression and memory loss. The patient is not nervous/anxious and does not have insomnia.        Physical Exam :   Physical Exam   Constitutional: She is oriented to person, place, and time and well-developed, well-nourished, and in no distress. No distress.   HENT:   Head: Normocephalic and atraumatic.   Right Ear: External ear normal.   Left Ear: External ear normal.   Nose: Nose normal.   Mouth/Throat: Oropharyngeal exudate present.   Eyes: Conjunctivae, EOM and lids are normal. Lids are everted and swept, no foreign bodies found. No scleral icterus. Pupils are unequal.       Neck: Normal range of motion. No JVD present. No tracheal deviation present. No thyromegaly present.   Cardiovascular: Normal rate, regular rhythm, normal heart sounds and intact distal pulses.   Pulmonary/Chest: Effort normal and breath sounds normal. No stridor. No respiratory distress. She has no wheezes. She has no rales. She exhibits no tenderness.   Abdominal:  Soft. Bowel sounds are normal. She exhibits no distension and no mass. There is no tenderness. There is no rebound and no guarding.   Genitourinary:   Genitourinary Comments: Not Examined   Musculoskeletal:        Arms:  Lymphadenopathy:        Head (right side): No submental, no submandibular, no tonsillar, no preauricular, no posterior auricular and no occipital adenopathy present.        Head (left side): No submental, no submandibular, no tonsillar, no preauricular, no posterior auricular and no occipital adenopathy present.     She has no axillary adenopathy.        Right: No inguinal, no supraclavicular and no epitrochlear adenopathy present.        Left: No inguinal, no supraclavicular and no epitrochlear adenopathy present.   Neurological: She is alert and oriented to person, place, and time. She has normal motor skills, normal sensation, normal strength, normal reflexes and intact cranial nerves. Gait normal. GCS score is 15.   Skin: Skin is warm, dry and intact. No rash noted. She is not diaphoretic. No cyanosis. No pallor. Nails show no clubbing.   Psychiatric: Mood, memory, affect and judgment normal.         Labs & Imaging :  03/20/19 : WBC 21,550. Hgb 9.9; Hct 31.7; MCV 78; Platelets 516,000. ANC 14,800.  NFBS 88; Cr.0.9; Bili 0.4 Ca 9.6. ALP 78.       Dx :  CML in remission.  Thrombocytosis and Leucocytosis secondary to steroids      Assessment & Plan:  Reviewed with Patient. . Patient has been on Prednisone for COPD  Continue Gleevec. 400 Mg  Once daily Monitor Labs. Should get BCR/ABL to make sure CML is still in remissionFollowup with Dr.Glenn Johnson,  RTC one month. Patient understands and verbalised.       Distress Screening Results: Psychosocial Distress screening score of   noted and reviewed. No intervention indicated.

## 2019-03-28 NOTE — TELEPHONE ENCOUNTER
I reached out to the patient after talking to the [facility]). [facility] stated that they discontinued the patient from the Gleevec program as they were unable to reach the patient. I called the patient and she first stated that she had not been taking the Gleevec since October as she was dealing with her house. When I instructed her that the pharmacy stated that they could not reach her , she stated that is a lye that she asked them about her med's and they told her that they(pharmacy) did not know why she was not getting her med's (Gleevec). New prescription sent to the patient pharmacy. Dr Tavarez notified. Patient notified that she will be receiving a new shipment. Pt verbalized understanding.

## 2019-03-28 NOTE — PROGRESS NOTES
The BCR/ABL test reported that it was positive. I wondered if patient was taking the medicine. My nurse thomas contacted the patient. Ms barnes stated that she has not taken gleevec since October 2018. Stressed importance of taking meds regularly. Sent refill for gleevec 400 mg once daily.

## 2019-04-22 PROBLEM — Z09 CHEMOTHERAPY FOLLOW-UP EXAMINATION: Status: ACTIVE | Noted: 2019-01-01

## 2019-04-22 NOTE — PROGRESS NOTES
"Chief Complaint :  CML.    Hx of Present illness :  Patient is a 74 y.o. year old female who presents to the clinic today for  Hematology followup.  On Gleevec   Appetite fair; Has Urinary  Incontinence. No more loose stools.   Has Dyspnea related to COPD. Under Care of Pulmonologist .  Presently on Gleevec 400 Mg daily.  Last office visit 6 months ago,.  Patient insisted  she has been takimng her gleevec regularly. Repeated BCR/ABL. Test was positive. Patient admitted she was not taking Rx as instructed. Resumed regul.ar schedule.      Allergies :    Review of patient's allergies indicates:   Allergen Reactions    Morphine      Pt states, "I'm not allergic to morphine they gave me too much at [facility]. Instead of giving me 2mg she gave me 5mg and I was almost dead."     Codeine      Blisters vs sweling (pt unsure which one)    Iodine containing multivitamin      Blisters vs swelling (pt unsure which one.)    Lidocaine        Occupation :  Homemaker    Transfusion :  None    Menstrual & obstetric Hx :  6, Para 6  Age of menarche: 13  Age of first pregnancy: 18  Lactation history: No  Age of menopause: Hysterectomy at age 33, for Fibroids and Bleeding  HRT:  None    Present Meds :  Reviewed  Medication List with Changes/Refills   Current Medications    ACETAMINOPHEN (TYLENOL) 325 MG TABLET    Take 2 tablets (650 mg total) by mouth every 4 (four) hours as needed for Pain or Temperature greater than (100).    ADVAIR DISKUS 100-50 MCG/DOSE DISKUS INHALER    Take 1 puff by mouth Twice daily.    ALENDRONATE (FOSAMAX) 70 MG TABLET    Take 70 mg by mouth.    AMLODIPINE (NORVASC) 10 MG TABLET    Take 1 tablet (10 mg total) by mouth once daily.    ANORO ELLIPTA 62.5-25 MCG/ACTUATION DSDV        ASPIRIN (ECOTRIN) 81 MG EC TABLET    Take 81 mg by mouth once daily.      ATORVASTATIN (LIPITOR) 80 MG TABLET    TK 1 T PO D    BD ULTRA-FINE ANISHA PEN NEEDLE 32 GAUGE X " NDLE    use for insulin up to FOUR " TIMES DAILY    BIDIL 20-37.5 MG TAB    TK 1 T PO TID    CALCIUM-VITAMIN D (CALCIUM-VITAMIN D) 500 MG(1,250MG) -200 UNIT PER TABLET    Take 1 tablet by mouth 2 (two) times daily with meals.      CLONIDINE (CATAPRES) 0.1 MG TABLET    Take 1 tablet (0.1 mg total) by mouth 2 (two) times daily.    CLOPIDOGREL (PLAVIX) 75 MG TABLET    TK 1 T PO D    CRESTOR 20 MG TABLET    Take 10 mg by mouth every evening.     ERGOCALCIFEROL (ERGOCALCIFEROL) 50,000 UNIT CAP    Take 50,000 Units by mouth every 7 days.      FAMOTIDINE (PEPCID) 40 MG TABLET        FUROSEMIDE (LASIX) 40 MG TABLET    Take 1 tablet (40 mg total) by mouth 2 (two) times daily.    GABAPENTIN (NEURONTIN) 300 MG CAPSULE        HYDROCODONE-ACETAMINOPHEN 5-325MG (NORCO) 5-325 MG PER TABLET    Take 1 tablet by mouth 3 (three) times daily as needed.     IMATINIB (GLEEVEC) 400 MG TAB    Take 1 tablet (400 mg total) by mouth once daily.    INSULIN GLARGINE (LANTUS) 100 UNIT/ML INJECTION    Inject 18 Units into the skin every evening.    ISOSORBIDE MONONITRATE (IMDUR) 30 MG 24 HR TABLET    Take 1 tablet (30 mg total) by mouth once daily.    LISINOPRIL (PRINIVIL,ZESTRIL) 40 MG TABLET    Take 1 tablet (40 mg total) by mouth once daily.    METOPROLOL TARTRATE (LOPRESSOR) 25 MG TABLET    TK 1 T PO BID    NICOTINE (NICODERM CQ) 21 MG/24 HR    Place 1 patch onto the skin once daily.    NITROGLYCERIN (NITROSTAT) 0.4 MG SL TABLET    Place 1 tablet (0.4 mg total) under the tongue every 5 (five) minutes as needed for Chest pain.    PREDNISOLONE ACETATE (PRED FORTE) 1 % DRPS        RANITIDINE (ZANTAC) 300 MG TABLET    TK 1 T PO QHS    RANOLAZINE (RANEXA) 500 MG TB12    Take 500 mg by mouth 2 (two) times daily.    TIOTROPIUM (SPIRIVA) 18 MCG INHALATION CAPSULE    Inhale 18 mcg into the lungs once daily.      VENTOLIN HFA 90 MCG/ACTUATION HFAA    Inhale 2 puffs into the lungs every 4 to 6 hours as needed.       Past Medical Hx : Known to Have DM, HTN, CML, COPD, Hyperlipidemia    Past  Medical Hx :  Past Medical History:   Diagnosis Date    Asthma     Cancer 10/10/2014    Bone cancer     CHF (congestive heart failure)     Cigarette smoker     COPD (chronic obstructive pulmonary disease)     Diabetes mellitus     Diabetes mellitus type II     Emphysema of lung     [place] (hard of hearing)     Hypercholesteremia     Hypertension     Myocardial infarct 12/25/2013    On home oxygen therapy     Palpitations     Pneumonia 03/07/2019    PVD (peripheral vascular disease)     Seizures     Stroke     Unsteady gait     uses a cane & walker       Travel Hx :  N/A    Immunization :  Immunization History   Administered Date(s) Administered    Influenza - High Dose 11/04/2014       Family Hx :  Family History   Problem Relation Age of Onset    Diabetes Mother     Asthma Mother     Cancer Father     Breast cancer Sister     Colon cancer Neg Hx     Ovarian cancer Neg Hx        Social Hx :  Social History     Socioeconomic History    Marital status:      Spouse name: Not on file    Number of children: Not on file    Years of education: Not on file    Highest education level: Not on file   Occupational History    Not on file   Social Needs    Financial resource strain: Not on file    Food insecurity:     Worry: Not on file     Inability: Not on file    Transportation needs:     Medical: Not on file     Non-medical: Not on file   Tobacco Use    Smoking status: Current Every Day Smoker     Packs/day: 0.50     Years: 56.00     Pack years: 28.00     Types: Cigarettes    Smokeless tobacco: Never Used   Substance and Sexual Activity    Alcohol use: No    Drug use: No    Sexual activity: Never   Lifestyle    Physical activity:     Days per week: Not on file     Minutes per session: Not on file    Stress: Not on file   Relationships    Social connections:     Talks on phone: Not on file     Gets together: Not on file     Attends [employer] service: Not on file     Active member  of club or organization: Not on file     Attends meetings of clubs or organizations: Not on file     Relationship status: Not on file   Other Topics Concern    Not on file   Social History Narrative    Not on file       Surgery : C. Section.  Hysterectomy. Appendectomy. Cholecystectomy. Colonoscopy;  Cataract Surgery    Symptoms :   Has Sx related to peripheral vascular insufficiency.    Review of Systems   Constitutional: Positive for malaise/fatigue. Negative for chills, fever and weight loss.        Losing weight.  Hospitalised for pneumonia one week ago.   HENT: Positive for congestion and hearing loss. Negative for tinnitus.    Eyes: Negative for blurred vision, double vision and photophobia.   Respiratory: Positive for shortness of breath (Related to COPD). Negative for cough, hemoptysis and sputum production.    Cardiovascular: Negative for chest pain, palpitations, orthopnea, claudication, leg swelling and PND.   Gastrointestinal: Positive for nausea. Negative for abdominal pain, blood in stool, constipation, heartburn and vomiting.   Genitourinary: Positive for urgency. Negative for dysuria, flank pain, frequency and hematuria.   Musculoskeletal: Positive for joint pain and neck pain. Negative for back pain, falls and myalgias.   Skin: Negative for itching and rash.        Dryness  Skin Left Shoulder   Neurological: Negative for dizziness, tingling, tremors, sensory change, focal weakness, weakness and headaches.   Endo/Heme/Allergies: Negative for polydipsia. Does not bruise/bleed easily.   Psychiatric/Behavioral: Negative for depression and memory loss. The patient is not nervous/anxious and does not have insomnia.        Physical Exam :   Physical Exam   Constitutional: She is oriented to person, place, and time and well-developed, well-nourished, and in no distress. No distress.   HENT:   Head: Normocephalic and atraumatic.   Right Ear: External ear normal.   Left Ear: External ear normal.   Nose:  Nose normal.   Mouth/Throat: Oropharyngeal exudate present.   Eyes: Conjunctivae, EOM and lids are normal. Lids are everted and swept, no foreign bodies found. No scleral icterus. Pupils are unequal.       Neck: Normal range of motion. No JVD present. No tracheal deviation present. No thyromegaly present.   Cardiovascular: Normal rate, regular rhythm, normal heart sounds and intact distal pulses.   Pulmonary/Chest: Effort normal and breath sounds normal. No stridor. No respiratory distress. She has no wheezes. She has no rales. She exhibits no tenderness.   Abdominal: Soft. Bowel sounds are normal. She exhibits no distension and no mass. There is no tenderness. There is no rebound and no guarding.   Genitourinary:   Genitourinary Comments: Not Examined   Musculoskeletal:        Arms:  Lymphadenopathy:        Head (right side): No submental, no submandibular, no tonsillar, no preauricular, no posterior auricular and no occipital adenopathy present.        Head (left side): No submental, no submandibular, no tonsillar, no preauricular, no posterior auricular and no occipital adenopathy present.     She has no axillary adenopathy.        Right: No inguinal, no supraclavicular and no epitrochlear adenopathy present.        Left: No inguinal, no supraclavicular and no epitrochlear adenopathy present.   Neurological: She is alert and oriented to person, place, and time. She has normal motor skills, normal sensation, normal strength, normal reflexes and intact cranial nerves. Gait normal. GCS score is 15.   Skin: Skin is warm, dry and intact. No rash noted. She is not diaphoretic. No cyanosis. No pallor. Nails show no clubbing.   Psychiatric: Mood, memory, affect and judgment normal.         Labs & Imaging :  03/20/19 : WBC 21,550. Hgb 9.9; Hct 31.7; MCV 78; Platelets 516,000. ANC 14,800.  NFBS 88; Cr.0.9; Bili 0.4 Ca 9.6. ALP 78.       Dx :  CML in remission.  Thrombocytosis and Leucocytosis secondary to  "steroids      Assessment & Plan:  Reviewed with Patient. . Patient has been on Prednisone for COPD  Continue Gleevec. 400 Mg  Once daily Monitor.  RTC one month. Patient understands and verbalised.   Medical followup with     Distress Screening Results: Psychosocial Distress screening score of   noted and reviewed. No intervention indicated. The BCR/ABL test reported that it was positive. I wondered if patient was taking the medicine. My nurse thomas contacted the patient. Ms barnes stated that she has not taken gleevec since October 2018. Stressed importance of taking meds regularly. Sent refill for gleevec 400 mg once daily.Chief Complaint : ***    Hx of Present illness :  Patient is a 74 y.o. year old female who presents to the clinic today for ***      Allergies :    Review of patient's allergies indicates:   Allergen Reactions    Morphine      Pt states, "I'm not allergic to morphine they gave me too much at [facility]. Instead of giving me 2mg she gave me 5mg and I was almost dead."     Codeine      Blisters vs sweling (pt unsure which one)    Iodinated contrast- oral and iv dye     Iodine containing multivitamin      Blisters vs swelling (pt unsure which one.)    Lidocaine        Occupation : ***    Transfusion :  {YES **/NO:56000}    Menstrual & obstetric Hx :  Age of menarche: ***  Age of first pregnancy: ***  Lactation history: ***  Age of menopause: ***  HRT: {YES **/NO:25312}    Present Meds :   Medication List with Changes/Refills   Current Medications    ACETAMINOPHEN (TYLENOL) 325 MG TABLET    Take 2 tablets (650 mg total) by mouth every 4 (four) hours as needed for Pain or Temperature greater than (100).    ADVAIR DISKUS 100-50 MCG/DOSE DISKUS INHALER    Take 1 puff by mouth Twice daily.    ALENDRONATE (FOSAMAX) 70 MG TABLET    Take 70 mg by mouth.    AMLODIPINE (NORVASC) 10 MG TABLET    Take 1 tablet (10 mg total) by mouth once daily.    ANORO ELLIPTA 62.5-25 MCG/ACTUATION DSDV        " "ASPIRIN (ECOTRIN) 81 MG EC TABLET    Take 81 mg by mouth once daily.      ATORVASTATIN (LIPITOR) 80 MG TABLET    TK 1 T PO D    BD ULTRA-FINE ANISHA PEN NEEDLE 32 GAUGE X 5/32" NDLE    use for insulin up to FOUR TIMES DAILY    BIDIL 20-37.5 MG TAB    TK 1 T PO TID    CALCIUM-VITAMIN D (CALCIUM-VITAMIN D) 500 MG(1,250MG) -200 UNIT PER TABLET    Take 1 tablet by mouth 2 (two) times daily with meals.      CLONIDINE (CATAPRES) 0.1 MG TABLET    Take 1 tablet (0.1 mg total) by mouth 2 (two) times daily.    CLOPIDOGREL (PLAVIX) 75 MG TABLET    TK 1 T PO D    CRESTOR 20 MG TABLET    Take 10 mg by mouth every evening.     ERGOCALCIFEROL (ERGOCALCIFEROL) 50,000 UNIT CAP    Take 50,000 Units by mouth every 7 days.      FAMOTIDINE (PEPCID) 40 MG TABLET        FUROSEMIDE (LASIX) 40 MG TABLET    Take 1 tablet (40 mg total) by mouth 2 (two) times daily.    GABAPENTIN (NEURONTIN) 300 MG CAPSULE        HYDROCODONE-ACETAMINOPHEN 5-325MG (NORCO) 5-325 MG PER TABLET    Take 1 tablet by mouth 3 (three) times daily as needed.     IMATINIB (GLEEVEC) 400 MG TAB    Take 1 tablet (400 mg total) by mouth once daily.    INSULIN GLARGINE (LANTUS) 100 UNIT/ML INJECTION    Inject 18 Units into the skin every evening.    ISOSORBIDE MONONITRATE (IMDUR) 30 MG 24 HR TABLET    Take 1 tablet (30 mg total) by mouth once daily.    LISINOPRIL (PRINIVIL,ZESTRIL) 40 MG TABLET    Take 1 tablet (40 mg total) by mouth once daily.    METOPROLOL TARTRATE (LOPRESSOR) 25 MG TABLET    TK 1 T PO BID    NICOTINE (NICODERM CQ) 21 MG/24 HR    Place 1 patch onto the skin once daily.    NITROGLYCERIN (NITROSTAT) 0.4 MG SL TABLET    Place 1 tablet (0.4 mg total) under the tongue every 5 (five) minutes as needed for Chest pain.    PREDNISOLONE ACETATE (PRED FORTE) 1 % DRPS        RANITIDINE (ZANTAC) 300 MG TABLET    TK 1 T PO QHS    RANOLAZINE (RANEXA) 500 MG TB12    Take 500 mg by mouth 2 (two) times daily.    TIOTROPIUM (SPIRIVA) 18 MCG INHALATION CAPSULE    Inhale 18 mcg " into the lungs once daily.      VENTOLIN HFA 90 MCG/ACTUATION HFAA    Inhale 2 puffs into the lungs every 4 to 6 hours as needed.       Past Medical Hx : N/A    Past Medical Hx :  Past Medical History:   Diagnosis Date    Asthma     Cancer 10/10/2014    Bone cancer     CHF (congestive heart failure)     Cigarette smoker     COPD (chronic obstructive pulmonary disease)     Diabetes mellitus     Diabetes mellitus type II     Emphysema of lung     [place] (hard of hearing)     Hypercholesteremia     Hypertension     Myocardial infarct 12/25/2013    On home oxygen therapy     Palpitations     Pneumonia 03/07/2019    PVD (peripheral vascular disease)     Seizures     Stroke     Unsteady gait     uses a cane & walker       Travel Hx :  ***    Immunization :  Immunization History   Administered Date(s) Administered    Influenza - High Dose 11/04/2014       Family Hx :  Family History   Problem Relation Age of Onset    Diabetes Mother     Asthma Mother     Cancer Father     Breast cancer Sister     Colon cancer Neg Hx     Ovarian cancer Neg Hx        Social Hx :  Social History     Socioeconomic History    Marital status:      Spouse name: Not on file    Number of children: Not on file    Years of education: Not on file    Highest education level: Not on file   Occupational History    Not on file   Social Needs    Financial resource strain: Not on file    Food insecurity:     Worry: Not on file     Inability: Not on file    Transportation needs:     Medical: Not on file     Non-medical: Not on file   Tobacco Use    Smoking status: Current Every Day Smoker     Packs/day: 0.50     Years: 56.00     Pack years: 28.00     Types: Cigarettes    Smokeless tobacco: Never Used   Substance and Sexual Activity    Alcohol use: No    Drug use: No    Sexual activity: Never   Lifestyle    Physical activity:     Days per week: Not on file     Minutes per session: Not on file    Stress: Not on file    Relationships    Social connections:     Talks on phone: Not on file     Gets together: Not on file     Attends Confucianism service: Not on file     Active member of club or organization: Not on file     Attends meetings of clubs or organizations: Not on file     Relationship status: Not on file   Other Topics Concern    Not on file   Social History Narrative    Not on file       Surgery  Appendectomy; Cholecystectomy; bone marrow Bx. Hysterectomy.  colonoscopy    Symptoms :    Review of Systems   Constitutional: Negative for chills, fever, malaise/fatigue and weight loss.        Complains of soreness left axilla   HENT: Negative for congestion, hearing loss and sore throat.    Eyes: Negative for blurred vision and double vision.        Cataract surgery   Respiratory: Negative for cough, hemoptysis and shortness of breath.    Cardiovascular: Negative for chest pain, palpitations and orthopnea.   Gastrointestinal: Negative for abdominal pain, constipation, diarrhea, heartburn, nausea and vomiting.   Genitourinary: Negative.    Musculoskeletal: Positive for joint pain and neck pain.   Neurological: Negative for dizziness, tingling and headaches.   Psychiatric/Behavioral: The patient is not nervous/anxious and does not have insomnia.        Physical Exam :   Physical Exam   Constitutional: She is oriented to person, place, and time and well-developed, well-nourished, and in no distress. No distress.   HENT:   Head: Normocephalic and atraumatic.   Right Ear: External ear normal.   Left Ear: External ear normal.   Nose: Nose normal.   Mouth/Throat: Oropharynx is clear and moist. No oropharyngeal exudate.   Eyes: Conjunctivae, EOM and lids are normal. Lids are everted and swept, no foreign bodies found. Right eye exhibits no discharge. Left eye exhibits no discharge. No scleral icterus.       Neck: Trachea normal, normal range of motion, full passive range of motion without pain and phonation normal. Neck supple.  Normal carotid pulses, no hepatojugular reflux and no JVD present. No tracheal tenderness present. Carotid bruit is not present. No tracheal deviation present. No thyroid mass and no thyromegaly present.   Cardiovascular: Normal rate, regular rhythm, normal heart sounds and intact distal pulses. Exam reveals no gallop and no friction rub.   No murmur heard.  Pulmonary/Chest: Effort normal and breath sounds normal. No stridor. No respiratory distress. She has no wheezes. She has no rales. She exhibits no tenderness.   Abdominal: Soft. Bowel sounds are normal. She exhibits no distension, no ascites and no mass. There is no hepatosplenomegaly. There is no tenderness. There is no rebound, no guarding and no CVA tenderness. No hernia.   Musculoskeletal: Normal range of motion. She exhibits no edema, tenderness or deformity.   Lymphadenopathy:        Head (right side): No submental, no submandibular, no tonsillar, no preauricular, no posterior auricular and no occipital adenopathy present.        Head (left side): No submental, no submandibular, no tonsillar, no preauricular, no posterior auricular and no occipital adenopathy present.     She has no cervical adenopathy.     She has no axillary adenopathy.        Right: No inguinal, no supraclavicular and no epitrochlear adenopathy present.        Left: No inguinal, no supraclavicular and no epitrochlear adenopathy present.   Neurological: She is alert and oriented to person, place, and time. She has normal reflexes. GCS score is 15.   Walks with cane   Skin: Skin is warm, dry and intact. No rash noted. She is not diaphoretic. No cyanosis or erythema. No pallor. Nails show no clubbing.   Psychiatric: Mood, memory, affect and judgment normal.         Labs & Imaging : 04/18/19 : NFBS 110; Cr.0.11. Ca 9.5 bili 0.3. ALP 93.  WBC 7,400. Hgb 10.2; Hct 34.1. P_latelets 253,000 ANC 3,670        Dx :  CML in remission.      Assessment & Plan

## 2019-05-30 NOTE — ED TRIAGE NOTES
ems states pt was at [facility] and pt became sob, felt she could not catch her breath. pt states hx of copd & asthma. had duoneb enroute and is now 100% on room air

## 2019-06-03 NOTE — PROGRESS NOTES
"Chief Complaint :  CML.    Hx of Present illness :  Patient is a 74 y.o. year old female who presents to the clinic today for  Hematology followup.  On Gleevec   Appetite fair; Has Urinary  Incontinence. No more loose stools.   Has Dyspnea related to COPD. Under Care of Pulmonologist .  Presently on Gleevec 400 Mg daily.  Last office visit 6 months ago,.  Patient insists she has been takimng her gleevec regularly. Had a fall and scraped right shin      Allergies :    Review of patient's allergies indicates:   Allergen Reactions    Morphine      Pt states, "I'm not allergic to morphine they gave me too much at [facility]. Instead of giving me 2mg she gave me 5mg and I was almost dead."     Codeine      Blisters vs sweling (pt unsure which one)    Iodine containing multivitamin      Blisters vs swelling (pt unsure which one.)    Lidocaine        Occupation :  Homemaker    Transfusion :  None    Menstrual & obstetric Hx :  6, Para 6  Age of menarche: 13  Age of first pregnancy: 18  Lactation history: No  Age of menopause: Hysterectomy at age 33, for Fibroids and Bleeding  HRT:  None    Present Meds :  Reviewed  Medication List with Changes/Refills   Current Medications    ACETAMINOPHEN (TYLENOL) 325 MG TABLET    Take 2 tablets (650 mg total) by mouth every 4 (four) hours as needed for Pain or Temperature greater than (100).    ADVAIR DISKUS 100-50 MCG/DOSE DISKUS INHALER    Take 1 puff by mouth Twice daily.    ALENDRONATE (FOSAMAX) 70 MG TABLET    Take 70 mg by mouth.    AMLODIPINE (NORVASC) 10 MG TABLET    Take 1 tablet (10 mg total) by mouth once daily.    ANORO ELLIPTA 62.5-25 MCG/ACTUATION DSDV        ASPIRIN (ECOTRIN) 81 MG EC TABLET    Take 81 mg by mouth once daily.      ATORVASTATIN (LIPITOR) 80 MG TABLET    TK 1 T PO D    BD ULTRA-FINE ANISHA PEN NEEDLE 32 GAUGE X " NDLE    use for insulin up to FOUR TIMES DAILY    BIDIL 20-37.5 MG TAB    TK 1 T PO TID    CALCIUM-VITAMIN D (CALCIUM-VITAMIN " D) 500 MG(1,250MG) -200 UNIT PER TABLET    Take 1 tablet by mouth 2 (two) times daily with meals.      CLONIDINE (CATAPRES) 0.1 MG TABLET    Take 1 tablet (0.1 mg total) by mouth 2 (two) times daily.    CLOPIDOGREL (PLAVIX) 75 MG TABLET    TK 1 T PO D    CRESTOR 20 MG TABLET    Take 10 mg by mouth every evening.     ERGOCALCIFEROL (ERGOCALCIFEROL) 50,000 UNIT CAP    Take 50,000 Units by mouth every 7 days.      FAMOTIDINE (PEPCID) 40 MG TABLET        FUROSEMIDE (LASIX) 40 MG TABLET    Take 1 tablet (40 mg total) by mouth 2 (two) times daily.    GABAPENTIN (NEURONTIN) 300 MG CAPSULE        HYDROCODONE-ACETAMINOPHEN 5-325MG (NORCO) 5-325 MG PER TABLET    Take 1 tablet by mouth 3 (three) times daily as needed.     IMATINIB (GLEEVEC) 400 MG TAB    Take 1 tablet (400 mg total) by mouth once daily.    INSULIN GLARGINE (LANTUS) 100 UNIT/ML INJECTION    Inject 18 Units into the skin every evening.    ISOSORBIDE MONONITRATE (IMDUR) 30 MG 24 HR TABLET    Take 1 tablet (30 mg total) by mouth once daily.    LISINOPRIL (PRINIVIL,ZESTRIL) 40 MG TABLET    Take 1 tablet (40 mg total) by mouth once daily.    METOPROLOL TARTRATE (LOPRESSOR) 25 MG TABLET    TK 1 T PO BID    NICOTINE (NICODERM CQ) 21 MG/24 HR    Place 1 patch onto the skin once daily.    NITROGLYCERIN (NITROSTAT) 0.4 MG SL TABLET    Place 1 tablet (0.4 mg total) under the tongue every 5 (five) minutes as needed for Chest pain.    PREDNISOLONE ACETATE (PRED FORTE) 1 % DRPS        RANITIDINE (ZANTAC) 300 MG TABLET    TK 1 T PO QHS    RANOLAZINE (RANEXA) 500 MG TB12    Take 500 mg by mouth 2 (two) times daily.    TIOTROPIUM (SPIRIVA) 18 MCG INHALATION CAPSULE    Inhale 18 mcg into the lungs once daily.      VENTOLIN HFA 90 MCG/ACTUATION HFAA    Inhale 2 puffs into the lungs every 4 to 6 hours as needed.       Past Medical Hx : Known to Have DM, HTN, CML, COPD, Hyperlipidemia    Past Medical Hx :  Past Medical History:   Diagnosis Date    Asthma     Cancer 10/10/2014     Bone cancer     CHF (congestive heart failure)     Cigarette smoker     COPD (chronic obstructive pulmonary disease)     Diabetes mellitus     Diabetes mellitus type II     Emphysema of lung     [place] (hard of hearing)     Hypercholesteremia     Hypertension     Myocardial infarct 12/25/2013    On home oxygen therapy     Palpitations     Pneumonia 03/07/2019    PVD (peripheral vascular disease)     Seizures     Stroke     Unsteady gait     uses a cane & walker       Travel Hx :  N/A    Immunization :  Immunization History   Administered Date(s) Administered    Influenza - High Dose 11/04/2014       Family Hx :  Family History   Problem Relation Age of Onset    Diabetes Mother     Asthma Mother     Cancer Father     Breast cancer Sister     Colon cancer Neg Hx     Ovarian cancer Neg Hx        Social Hx :  Social History     Socioeconomic History    Marital status:      Spouse name: Not on file    Number of children: Not on file    Years of education: Not on file    Highest education level: Not on file   Occupational History    Not on file   Social Needs    Financial resource strain: Not on file    Food insecurity:     Worry: Not on file     Inability: Not on file    Transportation needs:     Medical: Not on file     Non-medical: Not on file   Tobacco Use    Smoking status: Current Every Day Smoker     Packs/day: 0.50     Years: 56.00     Pack years: 28.00     Types: Cigarettes    Smokeless tobacco: Current User   Substance and Sexual Activity    Alcohol use: No    Drug use: No    Sexual activity: Never   Lifestyle    Physical activity:     Days per week: Not on file     Minutes per session: Not on file    Stress: Not on file   Relationships    Social connections:     Talks on phone: Not on file     Gets together: Not on file     Attends Hoahaoism service: Not on file     Active member of club or organization: Not on file     Attends meetings of clubs or organizations: Not  on file     Relationship status: Not on file   Other Topics Concern    Not on file   Social History Narrative    Not on file       Surgery : C. Section.  Hysterectomy. Appendectomy. Cholecystectomy. Colonoscopy Cataract Surgery    Symptoms :   Has Sx related to peripheral vascular insufficiency.    Review of Systems   Constitutional: Positive for malaise/fatigue. Negative for chills, fever and weight loss.        Losing weight.  Hospitalised for pneumonia one week ago.   HENT: Positive for congestion and hearing loss. Negative for tinnitus.    Eyes: Negative for blurred vision, double vision and photophobia.   Respiratory: Positive for shortness of breath (Related to COPD). Negative for cough, hemoptysis and sputum production.         Oxygen dependent   Cardiovascular: Negative for chest pain, palpitations, orthopnea, claudication, leg swelling and PND.   Gastrointestinal: Positive for nausea. Negative for abdominal pain, blood in stool, constipation, heartburn and vomiting.   Genitourinary: Positive for urgency. Negative for dysuria, flank pain, frequency and hematuria.   Musculoskeletal: Positive for joint pain and neck pain. Negative for back pain, falls and myalgias.   Skin: Negative for itching and rash.        Dryness  Skin Left Shoulder   Neurological: Negative for dizziness, tingling, tremors, sensory change, focal weakness, weakness and headaches.   Endo/Heme/Allergies: Negative for polydipsia. Does not bruise/bleed easily.   Psychiatric/Behavioral: Negative for depression and memory loss. The patient is not nervous/anxious and does not have insomnia.        Physical Exam :   Physical Exam   Constitutional: She is oriented to person, place, and time and well-developed, well-nourished, and in no distress. No distress.   HENT:   Head: Normocephalic and atraumatic.   Right Ear: External ear normal.   Left Ear: External ear normal.   Nose: Nose normal.   Mouth/Throat: Oropharyngeal exudate present.   Eyes:  Conjunctivae, EOM and lids are normal. Lids are everted and swept, no foreign bodies found. No scleral icterus. Pupils are unequal.       Neck: Normal range of motion. No JVD present. No tracheal deviation present. No thyromegaly present.   Cardiovascular: Normal rate, regular rhythm, normal heart sounds and intact distal pulses.   Pulmonary/Chest: Effort normal and breath sounds normal. No stridor. No respiratory distress. She has no wheezes. She has no rales. She exhibits no tenderness.   Abdominal: Soft. Bowel sounds are normal. She exhibits no distension and no mass. There is no tenderness. There is no rebound and no guarding.   Genitourinary:   Genitourinary Comments: Not Examined   Musculoskeletal:        Arms:  Lymphadenopathy:        Head (right side): No submental, no submandibular, no tonsillar, no preauricular, no posterior auricular and no occipital adenopathy present.        Head (left side): No submental, no submandibular, no tonsillar, no preauricular, no posterior auricular and no occipital adenopathy present.     She has no axillary adenopathy.        Right: No inguinal, no supraclavicular and no epitrochlear adenopathy present.        Left: No inguinal, no supraclavicular and no epitrochlear adenopathy present.   Neurological: She is alert and oriented to person, place, and time. She has normal motor skills, normal sensation, normal strength, normal reflexes and intact cranial nerves. Gait normal. GCS score is 15.   Skin: Skin is warm and dry. No rash noted. She is not diaphoretic. No cyanosis. No pallor. Nails show no clubbing.        Psychiatric: Mood, memory, affect and judgment normal.         Labs & Imaging :  03/20/19 : WBC 21,550. Hgb 9.9; Hct 31.7; MCV 78; Platelets 516,000. ANC 14,800.  NFBS 88; Cr.0.9; Bili 0.4 Ca 9.6. ALP 78.   05/29/19 :  NFBS 170; Cr.0.9;  Ca 9.4; bili 0.2; Liver enzymes normal.  WBC 8,410; Hgb 10.5; hct 33.9; platelets 268,000. ANC 5,600.   BCR/ABL, p190 was  undetectable in mar 2019  Dx :  CML in remission.  Thrombocytosis and Leucocytosis secondary to steroids      Assessment & Plan:  Reviewed with Patient. .Neosporin topical.  Patient has been on Prednisone for COPD  Continue Gleevec. 400 Mg  Followup with Dr.Glenn Johnson,  RTC one month. Patient understands and verbalised.       Distress Screening Results: Psychosocial Distress screening score of   noted and reviewed. No intervention indicated.

## 2019-06-13 PROBLEM — I50.20 SYSTOLIC CONGESTIVE HEART FAILURE: Status: ACTIVE | Noted: 2019-01-01

## 2019-06-13 PROBLEM — R07.9 CHEST PAIN: Status: ACTIVE | Noted: 2019-01-01

## 2019-06-13 NOTE — HPI
Susan Howell 74 y.o. female with COPD, CAD, HTN, HLD, DM2, and tobacco abuse presents to the hospital with a chief complaint of SOB. She reports yesterday she became SOB, but today the symptoms worsened today while smoking a cigarette. She felt very anxious and she then developed left sided chest tightness that was without radiation and resolved with treatment by EMS. She states they gave her a breathing treatment. She also self treated the chest pain with nitro/aspirin at home. She states this pain does not feel like a previous MI. She states her chest pain is completely resolved. Her breathing has improved with duo-nebs and steroids improved. She endorses a non-productive cough that is not worsened compared to her baseline. She denies fever, N/V, abdominal pain, syncope, dizziness, orthopnea, leg swelling, and dysuria.     In the ED, BNP 1300, troponin 0.05 at baseline, chest x-ray without coarse interstitial markings, and EKG of NSR with LVH.

## 2019-06-13 NOTE — ED PROVIDER NOTES
"Encounter Date: 6/13/2019    SCRIBE #1 NOTE: I, Roberta Andrade, am scribing for, and in the presence of,  Dr. Hawley. I have scribed the entire note.       History     Chief Complaint   Patient presents with    Shortness of Breath     pt with hx of COPD reports anxiety and SOB, on arrival pt states she feel better after EMS treatment and would like to go home     CC: Shortness of breath    HPI:  This is a 74 y.o. female smoker with a PMHx of asthma, HFrEF (LVEF 35% 2/2019), COPD, CAD and PSHx of stent placement who presents to the Emergency Department via EMS with a cc of shortness of breath today. She states she had been feeling anxious when she developed substernal chest pain and shortness of breath; she treated herself with 2 NTG and baby aspirin which relieved the chest pain, but her shortness of breath persists. Associated symptoms include a productive cough that has worsened today. She denies fever, leg swelling, or vomting. She reports no worsening factors. She reports a prior history of similar symptoms. She reports current tobacco use.  Patient is somewhat a difficult historian.  When asked about her medical problems she states, "I got all of them. Anything you can think of I got."  Initially on arrival to the ER, she had been treated via EMS with a DuoNeb and felt improved.  She was requesting to leave against medical advice while waiting for bed.  However, it she shortly the developed recurrence of shortness of breath and was agreeable to staying for evaluation and treatment.        The history is provided by the patient.     Review of patient's allergies indicates:   Allergen Reactions    Morphine      Pt states, "I'm not allergic to morphine they gave me too much at [facility]. Instead of giving me 2mg she gave me 5mg and I was almost dead."     Codeine      Blisters vs sweling (pt unsure which one)    Iodinated contrast- oral and iv dye     Iodine containing multivitamin      Blisters vs swelling " (pt unsure which one.)    Lidocaine      Past Medical History:   Diagnosis Date    Asthma     Cancer 10/10/2014    Bone cancer     CHF (congestive heart failure)     Cigarette smoker     COPD (chronic obstructive pulmonary disease)     Diabetes mellitus     Diabetes mellitus type II     Emphysema of lung     [place] (hard of hearing)     Hypercholesteremia     Hypertension     Myocardial infarct 2013    On home oxygen therapy     Palpitations     Pneumonia 2019    PVD (peripheral vascular disease)     Seizures     Stroke     Unsteady gait     uses a cane & walker     Past Surgical History:   Procedure Laterality Date    APPENDECTOMY       SECTION      CHOLECYSTECTOMY      HYSTERECTOMY      VASCULAR SURGERY      stent in L leg; unable to place in R leg d/t blockage     Family History   Problem Relation Age of Onset    Diabetes Mother     Asthma Mother     Cancer Father     Breast cancer Sister     Colon cancer Neg Hx     Ovarian cancer Neg Hx      Social History     Tobacco Use    Smoking status: Current Every Day Smoker     Packs/day: 0.50     Years: 56.00     Pack years: 28.00     Types: Cigarettes    Smokeless tobacco: Current User   Substance Use Topics    Alcohol use: No    Drug use: No     Review of Systems   Constitutional: Negative for chills and fever.   HENT: Negative for congestion.    Respiratory: Positive for cough and shortness of breath.    Cardiovascular: Positive for chest pain (resolved). Negative for leg swelling.   Gastrointestinal: Negative for abdominal pain, nausea and vomiting.   Genitourinary: Negative for difficulty urinating.   Musculoskeletal: Negative for arthralgias.   Skin: Negative for rash.   Allergic/Immunologic: Negative for immunocompromised state.   Neurological: Negative for weakness and numbness.   Psychiatric/Behavioral: The patient is nervous/anxious (resolved).    All other systems reviewed and are negative.      Physical  Exam     Initial Vitals [06/13/19 1117]   BP Pulse Resp Temp SpO2   (!) 170/81 80 (!) 24 98.6 °F (37 °C) 100 %      MAP       --         Physical Exam    Constitutional: She appears well-developed and well-nourished. She is not diaphoretic. No distress.   HENT:   Mouth/Throat: Oropharynx is clear and moist.   Eyes: Pupils are equal, round, and reactive to light.   Neck: Neck supple.   Cardiovascular: Normal rate and regular rhythm.   Pulmonary/Chest: No respiratory distress. She has wheezes.   Abdominal: Soft. There is no tenderness.   Musculoskeletal: She exhibits no edema.   Neurological: She is alert and oriented to person, place, and time.   Skin: Skin is warm and dry.   Psychiatric: She has a normal mood and affect.         ED Course   Procedures  Labs Reviewed   CBC W/ AUTO DIFFERENTIAL - Abnormal; Notable for the following components:       Result Value    Hemoglobin 10.7 (*)     Hematocrit 34.1 (*)     Mean Corpuscular Volume 80 (*)     Mean Corpuscular Hemoglobin 25.2 (*)     Mean Corpuscular Hemoglobin Conc 31.4 (*)     RDW 20.0 (*)     Lymph # 0.5 (*)     Mono # 0.1 (*)     Gran% 92.9 (*)     Lymph% 5.9 (*)     Mono% 1.2 (*)     All other components within normal limits    Narrative:     Recoll. [id number] by [facility] at 06/13/2019 13:39, reason: Specimen   hemolyzed. Tube has been refrigerated.06/13/2019  13:37 Called   to ER for recollect,   COMPREHENSIVE METABOLIC PANEL - Abnormal; Notable for the following components:    Glucose 226 (*)     ALT 6 (*)     All other components within normal limits    Narrative:     Recoll. [id number] by [facility] at 06/13/2019 13:39, reason: Specimen   hemolyzed. Tube has been refrigerated.06/13/2019  13:37 Called   to ER for recollect,   TROPONIN I - Abnormal; Notable for the following components:    Troponin I 0.050 (*)     All other components within normal limits    Narrative:     Recoll. [id number] by [facility] at 06/13/2019 13:39, reason: Specimen   hemolyzed. Tube has been  refrigerated.06/13/2019  13:37 Called   to ER for recollect,   B-TYPE NATRIURETIC PEPTIDE - Abnormal; Notable for the following components:    BNP 1,322 (*)     All other components within normal limits    Narrative:     [place]oll. [id number] [place] [facility] [place] 06/13/2019 13:39, reason: Specimen   hemolyzed. Tube has been refrigerated.06/13/2019  13:37 Called   to ER for recollect,   HEMOGLOBIN A1C   POCT GLUCOSE MONITORING CONTINUOUS     EKG Readings: (Independently Interpreted)   1:13 PM: Normal sinus rhythm. Rate 68bpm. No STEMI. Normal GA interval. .     ECG Results          EKG 12-lead (Final result)  Result time 06/13/19 15:13:18    Final result by Interface, Lab In [facility] (06/13/19 15:13:18)                 Narrative:    Test Reason : R07.9,    Vent. Rate : 068 BPM     Atrial Rate : 068 BPM     P-R Int : 136 ms          QRS Dur : 088 ms      QT Int : 416 ms       P-R-T Axes : 052 073 -81 degrees     QTc Int : 442 ms    Normal sinus rhythm  LVH with repolarization abnormality  Abnormal ECG  When compared with ECG of 29-MAY-2019 22:29,  No significant change was found    Confirmed by Fermin Healy MD ([id number]) on 6/13/2019 3:13:08 PM    Referred By: LISAERR   SELF           Confirmed By:Fermin Healy MD                            Imaging Results          X-Ray Chest AP Portable (Final result)  Result time 06/13/19 13:31:12    Final result by Apollo Alvarez MD (06/13/19 13:31:12)                 Impression:      1. Coarse interstitial attenuation, similar to the previous examination, no large consolidation.      Electronically signed by: Apollo Alvarez MD  Date:    06/13/2019  Time:    13:31             Narrative:    EXAMINATION:  XR CHEST AP PORTABLE    CLINICAL HISTORY:  shortness of breath;    TECHNIQUE:  Single frontal view of the chest was performed.    COMPARISON:  05/29/2019    FINDINGS:  The cardiomediastinal silhouette is prominent, similar to the previous exam, magnified by technique.  There is  calcification of the aorta..  There is no pleural effusion.  The trachea is midline.  The lungs are symmetrically expanded bilaterally with coarse interstitial attenuation, similar to the previous exam..  No large focal consolidation seen.  There is no pneumothorax.  The osseous structures are remarkable for degenerative changes noting dextroscoliotic curvature of the spine..                                 Medical Decision Making:   Initial Assessment:   74-year-old female with COPD, CAD presents with shortness of breath, chest pain.  Chest pain resolved with 2 sublingual nitroglycerin.  Shortness of breath improved after duo nebs and route via EMS.  Exam notable for female with expiratory wheezes on exam, no peripheral edema, regular rate and rhythm. Differential includes but not limited to COPD exacerbation versus ACS versus pneumonia.  I did consider PE in this patient, however I think less likely as her symptoms have improved with DuoNeb treatment.  Workup initiated with labs, chest x-ray.  Anticipate admission for high risk chest pain.  Independently Interpreted Test(s):   I have ordered and independently interpreted EKG Reading(s) - see prior notes  Clinical Tests:   Lab Tests: Ordered and Reviewed  Radiological Study: Ordered and Reviewed  Medical Tests: Ordered and Reviewed  ED Management:  3:06 PM: I discussed the case with Christopher Mclean for placement in observation. Troponin minimally elevated, appears near previous results, but given onset of chest pain PTA, I think she needs to be placed in observation to continue trending her cardiac markers. She has remained chest pain free in the ER.    Additional MDM:   Heart Score:    History:          Highly suspicious.  ECG:             Nonspecific repolarisation disturbance  Age:               >65 years  Risk factors: >= 3 risk factors or history of atherosclerotic disease  Troponin:       1-2x normal limit  Final Score: 8                       Clinical  Impression:     1. Chest pain    2. Chronic obstructive pulmonary disease, unspecified COPD type    3. Shortness of breath    4. Coronary artery disease, angina presence unspecified, unspecified vessel or lesion type, unspecified whether native or transplanted heart            Disposition:   Disposition: Placed in Observation  Condition: Stable    Scribe attestation: I, Dr. Su Hawley, personally performed the services described in this documentation. All medical record entries made by the scribe were at my direction and in my presence.  I have reviewed the chart and agree that the record reflects my personal performance and is accurate and complete.                      Su Hawley MD  06/13/19 [id number]

## 2019-06-13 NOTE — NURSING
Patient arrived to floor in stable condition. Placed patient on the monitor. Visualized patient and assessed patient's overall condition and appearance. Pt very anxious, c/o sore throat and heart burn. Denies chest pain. Instructed pt to take slow deep breaths and relax; provided pt with chloraseptic spray. NAD noted. Will continue to monitor.

## 2019-06-13 NOTE — PLAN OF CARE
06/13/19 1617   Discharge Assessment   Assessment Type Discharge Planning Assessment   Assessment information obtained from? Medical Record   Prior to hospitilization cognitive status: Alert/Oriented   Prior to hospitalization functional status: Independent;Assistive Equipment   Current cognitive status: Alert/Oriented   Current Functional Status: Independent;Assistive Equipment   Facility Arrived From: home   Lives With child(zenaida), adult   Able to Return to Prior Arrangements yes   Is patient able to care for self after discharge? Yes   Who are your caregiver(s) and their phone number(s)? Irma   Patient's perception of discharge disposition home or selfcare   Readmission Within the Last 30 Days no previous admission in last 30 days   Patient currently being followed by outpatient case management? No   Patient currently receives any other outside agency services? No   Equipment Currently Used at Home cane, quad;rollator;shower chair;oxygen   Do you have any problems affording any of your prescribed medications? No   Is the patient taking medications as prescribed? yes   Does the patient have transportation home? Yes   Transportation Anticipated family or friend will provide   Does the patient receive services at the [facility]? No   Discharge Plan A Home with family   Discharge Plan B Home with family  (with follow up appointment)   DME Needed Upon Discharge    (TBD)   Patient/Family in Agreement with Plan yes     [facility] [place] [address]  [address]  [place] [address]  Phone: [phone] Fax: [phone]    [facility] [id number]  [address]ER[address]  [address]  [address]  [place] [address]  Phone: [phone] Fax: [phone]

## 2019-06-13 NOTE — SUBJECTIVE & OBJECTIVE
"Past Medical History:   Diagnosis Date    Asthma     Cancer 10/10/2014    Bone cancer     CHF (congestive heart failure)     Cigarette smoker     COPD (chronic obstructive pulmonary disease)     Diabetes mellitus     Diabetes mellitus type II     Emphysema of lung     [place] hearing[place]     Hypercholesteremia     Hypertension     Myocardial infarct 2013    On home oxygen therapy     Palpitations     Pneumonia 2019    PVD (peripheral vascular disease)     Seizures     Stroke     Unsteady gait     uses a cane & walker       Past Surgical History:   Procedure Laterality Date    APPENDECTOMY       SECTION      CHOLECYSTECTOMY      HYSTERECTOMY      VASCULAR SURGERY      stent in L leg; unable to place in R leg d/t blockage       Review of patient's allergies indicates:   Allergen Reactions    Morphine      Pt states, "I'm not allergic to morphine they gave me too much at [facility]. Instead of giving me 2mg she gave me 5mg and I was almost dead."     Codeine      Blisters vs sweling (pt unsure which one)    Iodinated contrast- oral and iv dye     Iodine containing multivitamin      Blisters vs swelling (pt unsure which one.)    Lidocaine        No current facility-administered medications on file prior to encounter.      Current Outpatient Medications on File Prior to Encounter   Medication Sig    acetaminophen (TYLENOL) 325 MG tablet Take 2 tablets (650 mg total) by mouth every 4 (four) hours as needed for Pain or Temperature greater than (100).    ADVAIR DISKUS 100-50 mcg/dose diskus inhaler Take 1 puff by mouth Twice daily.    alendronate (FOSAMAX) 70 MG tablet Take 70 mg by mouth.    amlodipine (NORVASC) 10 MG tablet Take 1 tablet (10 mg total) by mouth once daily.    ANORO ELLIPTA 62.5-25 mcg/actuation DsDv     aspirin (ECOTRIN) 81 MG EC tablet Take 81 mg by mouth once daily.      atorvastatin (LIPITOR) 80 MG tablet TK 1 T PO D    BD ULTRA-FINE ANISHA PEN " "NEEDLE 32 gauge x 5/32" Ndle use for insulin up to FOUR TIMES DAILY    BIDIL 20-37.5 mg Tab TK 1 T PO TID    calcium-vitamin D (CALCIUM-VITAMIN D) 500 mg(1,250mg) -200 unit per tablet Take 1 tablet by mouth 2 (two) times daily with meals.      cloNIDine (CATAPRES) 0.1 MG tablet Take 1 tablet (0.1 mg total) by mouth 2 (two) times daily.    clopidogrel (PLAVIX) 75 mg tablet TK 1 T PO D    CRESTOR 20 mg tablet Take 10 mg by mouth every evening.     ergocalciferol (ERGOCALCIFEROL) 50,000 unit Cap Take 50,000 Units by mouth every 7 days.      famotidine (PEPCID) 40 MG tablet     furosemide (LASIX) 40 MG tablet Take 1 tablet (40 mg total) by mouth 2 (two) times daily.    gabapentin (NEURONTIN) 300 MG capsule     hydrocodone-acetaminophen 5-325mg (NORCO) 5-325 mg per tablet Take 1 tablet by mouth 3 (three) times daily as needed.     imatinib (GLEEVEC) 400 MG Tab Take 1 tablet (400 mg total) by mouth once daily.    insulin glargine (LANTUS) 100 unit/mL injection Inject 18 Units into the skin every evening.    isosorbide mononitrate (IMDUR) 30 MG 24 hr tablet Take 1 tablet (30 mg total) by mouth once daily.    lisinopril (PRINIVIL,ZESTRIL) 40 MG tablet Take 1 tablet (40 mg total) by mouth once daily.    metoprolol tartrate (LOPRESSOR) 25 MG tablet TK 1 T PO BID    nicotine (NICODERM CQ) 21 mg/24 hr Place 1 patch onto the skin once daily.    nitroGLYCERIN (NITROSTAT) 0.4 MG SL tablet Place 1 tablet (0.4 mg total) under the tongue every 5 (five) minutes as needed for Chest pain.    prednisoLONE acetate (PRED FORTE) 1 % DrpS     ranitidine (ZANTAC) 300 MG tablet TK 1 T PO QHS    ranolazine (RANEXA) 500 MG Tb12 Take 500 mg by mouth 2 (two) times daily.    tiotropium (SPIRIVA) 18 mcg inhalation capsule Inhale 18 mcg into the lungs once daily.      VENTOLIN HFA 90 mcg/actuation HFAA Inhale 2 puffs into the lungs every 4 to 6 hours as needed.     Family History     Problem Relation (Age of Onset)    Asthma " Mother    Breast cancer Sister    Cancer Father    Diabetes Mother        Tobacco Use    Smoking status: Current Every Day Smoker     Packs/day: 0.50     Years: 56.00     Pack years: 28.00     Types: Cigarettes    Smokeless tobacco: Current User   Substance and Sexual Activity    Alcohol use: No    Drug use: No    Sexual activity: Never     Review of Systems   Constitutional: Negative for chills and fever.   HENT: Negative for nosebleeds and tinnitus.    Eyes: Negative for photophobia and visual disturbance.   Respiratory: Positive for cough (non-productive, not worse compared to baseline), shortness of breath and wheezing.    Cardiovascular: Positive for chest pain. Negative for palpitations and leg swelling.   Gastrointestinal: Negative for abdominal distention, nausea and vomiting.   Genitourinary: Negative for dysuria, flank pain and hematuria.   Musculoskeletal: Negative for gait problem and joint swelling.   Skin: Negative for rash and wound.   Neurological: Negative for seizures and syncope.     Objective:     Vital Signs (Most Recent):  Temp: 98 °F (36.7 °C) (06/13/19 1602)  Pulse: 101 (06/13/19 1602)  Resp: (!) 24 (06/13/19 1602)  BP: (!) 134/96 (06/13/19 1602)  SpO2: 100 % (06/13/19 1602) Vital Signs (24h Range):  Temp:  [98 °F (36.7 °C)-98.6 °F (37 °C)] 98 °F (36.7 °C)  Pulse:  [] 101  Resp:  [16-38] 24  SpO2:  [94 %-100 %] 100 %  BP: (134-184)/(73-96) 134/96     Weight: 59.4 kg (131 lb)  Body mass index is 22.49 kg/m².    Physical Exam   Constitutional: She is oriented to person, place, and time. She appears well-developed and well-nourished. No distress.   HENT:   Head: Normocephalic and atraumatic.   Right Ear: External ear normal.   Left Ear: External ear normal.   Eyes: Conjunctivae and EOM are normal. Right eye exhibits no discharge. Left eye exhibits no discharge.   Neck: Normal range of motion. No JVD present. No thyromegaly present.   Cardiovascular: Normal rate and regular rhythm.    No murmur heard.  Pulmonary/Chest: Effort normal. No respiratory distress. She exhibits no tenderness.   Course breath sounds few end expiratory wheezes   Abdominal: Soft. Bowel sounds are normal. She exhibits no distension and no mass. There is no tenderness.   Musculoskeletal: She exhibits no edema or deformity.   Neurological: She is alert and oriented to person, place, and time.   Skin: Skin is warm and dry.   Psychiatric: She has a normal mood and affect. Her behavior is normal.   Nursing note and vitals reviewed.        CRANIAL NERVES     CN III, IV, VI   Extraocular motions are normal.        Significant Labs:   CBC:   Recent Labs   Lab 06/13/19  1400   WBC 8.13   HGB 10.7*   HCT 34.1*        CMP:   Recent Labs   Lab 06/13/19  1400      K 4.9      CO2 25   *   BUN 14   CREATININE 0.9   CALCIUM 10.3   PROT 7.7   ALBUMIN 4.3   BILITOT 0.2   ALKPHOS 79   AST 13   ALT 6*   ANIONGAP 11   EGFRNONAA >60     Cardiac Markers:   Recent Labs   Lab 06/13/19  1400   BNP 1,322*     Troponin:   Recent Labs   Lab 06/13/19  1400   TROPONINI 0.050*       Significant Imaging:   Imaging Results          X-Ray Chest AP Portable (Final result)  Result time 06/13/19 13:31:12    Final result by Apollo Alvarez MD (06/13/19 13:31:12)                 Impression:      1. Coarse interstitial attenuation, similar to the previous examination, no large consolidation.      Electronically signed by: Apollo Alvarez MD  Date:    06/13/2019  Time:    13:31             Narrative:    EXAMINATION:  XR CHEST AP PORTABLE    CLINICAL HISTORY:  shortness of breath;    TECHNIQUE:  Single frontal view of the chest was performed.    COMPARISON:  05/29/2019    FINDINGS:  The cardiomediastinal silhouette is prominent, similar to the previous exam, magnified by technique.  There is calcification of the aorta..  There is no pleural effusion.  The trachea is midline.  The lungs are symmetrically expanded bilaterally with coarse  interstitial attenuation, similar to the previous exam..  No large focal consolidation seen.  There is no pneumothorax.  The osseous structures are remarkable for degenerative changes noting dextroscoliotic curvature of the spine..

## 2019-06-13 NOTE — ASSESSMENT & PLAN NOTE
Greater than 3minutes spent counseling patient on dangers of contuinued tobacco use. Will provide tobacco cessation education.

## 2019-06-13 NOTE — Clinical Note
Catheter is repositioned to the ostium   left anterior descending. Angiography performed of the left coronary arteries in multiple views. Angiography performed via power injection with 10 mL contrast at 4 mL/s.

## 2019-06-13 NOTE — ASSESSMENT & PLAN NOTE
Previous echo with EF of 35% and diastolic dysfunction  Appears euvolemic on exam. Denies any leg swelling or orthopnea. BNP of 1300 and troponin 0.05. Troponin at baseline.  Will continue home lisinopril, metoprolol, imdur, and ranexa. Continue home lasix as SOB symptoms seem more consistent with COPD.  Daily weights, strict intake and output. Telemetry  Low salt diet.

## 2019-06-13 NOTE — ASSESSMENT & PLAN NOTE
Troponin mildly elevated though consistent with baseline, EKG without acute ischemia, chest x-ray with course interstitial markings. Seems to be due to COPD exacerbation as patient reports developed SOB with chest pain while smoking a cigarette. Chest pain free now  -Troponin trend  -Telemetry  -Aspirin/statin/plavix continue from home  -Continue duo-nebs and PO steroids as chest pain seemed related to COPD.

## 2019-06-13 NOTE — ASSESSMENT & PLAN NOTE
H&H at baseline. No complaints of active bleeding or indications for transfusion. Will continue to monitor.

## 2019-06-13 NOTE — HOSPITAL COURSE
Susan Howell 74 y.o. female with history for COPD on 2 L home oxygen, CAD sp MI/stent,  Combined systolic diastolic heart failure, HTN, HLD, DMT2 and current smoker is admitted for chest pain found to have NSTEMI and COPD exacerbation.  In the ED, EKG NSR  BNP 1300. Troponin 0.05 then up to 23 ,cardiogy was consulted,had LHC with PCI on Lcx,was on ACS medications,  Her symptoms much improved,was chest pain free at DC time,patient has been discharged home with ACS medication and follow up with PCP and out patient cardiology in next few days.

## 2019-06-13 NOTE — H&P
[facility] Medicine  History & Physical    Patient Name: Susan Howell  MRN: [id number]  Admission Date: 6/13/2019  Attending Physician: Amy Santacruz MD   Primary Care Provider: Linda Mckee MD         Patient information was obtained from patient, past medical records and ER records.     Subjective:     Principal Problem:Chest pain    Chief Complaint:   Chief Complaint   Patient presents with    Shortness of Breath     pt with hx of COPD reports anxiety and SOB, on arrival pt states she feel better after EMS treatment and would like to go home        HPI: Susan Howell 74 y.o. female with COPD, CAD, HTN, HLD, DM2, and tobacco abuse presents to the hospital with a chief complaint of SOB. She reports yesterday she became SOB, but today the symptoms worsened today while smoking a cigarette. She felt very anxious and she then developed left sided chest tightness that was without radiation and resolved with treatment by EMS. She states they gave her a breathing treatment. She also self treated the chest pain with nitro/aspirin at home. She states this pain does not feel like a previous MI. She states her chest pain is completely resolved. Her breathing has improved with duo-nebs and steroids improved. She endorses a non-productive cough that is not worsened compared to her baseline. She denies fever, N/V, abdominal pain, syncope, dizziness, orthopnea, leg swelling, and dysuria.     In the ED, BNP 1300, troponin 0.05 at baseline, chest x-ray without coarse interstitial markings, and EKG of NSR with LVH.    Past Medical History:   Diagnosis Date    Asthma     Cancer 10/10/2014    Bone cancer     CHF (congestive heart failure)     Cigarette smoker     COPD (chronic obstructive pulmonary disease)     Diabetes mellitus     Diabetes mellitus type II     Emphysema of lung     [place] (hard of hearing)     Hypercholesteremia     Hypertension     Myocardial infarct 12/25/2013    On  "home oxygen therapy     Palpitations     Pneumonia 2019    PVD (peripheral vascular disease)     Seizures     Stroke     Unsteady gait     uses a cane & walker       Past Surgical History:   Procedure Laterality Date    APPENDECTOMY       SECTION      CHOLECYSTECTOMY      HYSTERECTOMY      VASCULAR SURGERY      stent in L leg; unable to place in R leg d/t blockage       Review of patient's allergies indicates:   Allergen Reactions    Morphine      Pt states, "I'm not allergic to morphine they gave me too much at [facility]. Instead of giving me 2mg she gave me 5mg and I was almost dead."     Codeine      Blisters vs sweling (pt unsure which one)    Iodinated contrast- oral and iv dye     Iodine containing multivitamin      Blisters vs swelling (pt unsure which one.)    Lidocaine        No current facility-administered medications on file prior to encounter.      Current Outpatient Medications on File Prior to Encounter   Medication Sig    acetaminophen (TYLENOL) 325 MG tablet Take 2 tablets (650 mg total) by mouth every 4 (four) hours as needed for Pain or Temperature greater than (100).    ADVAIR DISKUS 100-50 mcg/dose diskus inhaler Take 1 puff by mouth Twice daily.    alendronate (FOSAMAX) 70 MG tablet Take 70 mg by mouth.    amlodipine (NORVASC) 10 MG tablet Take 1 tablet (10 mg total) by mouth once daily.    ANORO ELLIPTA 62.5-25 mcg/actuation DsDv     aspirin (ECOTRIN) 81 MG EC tablet Take 81 mg by mouth once daily.      atorvastatin (LIPITOR) 80 MG tablet TK 1 T PO D    BD ULTRA-FINE ANISHA PEN NEEDLE 32 gauge x 5/32" Ndle use for insulin up to FOUR TIMES DAILY    BIDIL 20-37.5 mg Tab TK 1 T PO TID    calcium-vitamin D (CALCIUM-VITAMIN D) 500 mg(1,250mg) -200 unit per tablet Take 1 tablet by mouth 2 (two) times daily with meals.      cloNIDine (CATAPRES) 0.1 MG tablet Take 1 tablet (0.1 mg total) by mouth 2 (two) times daily.    clopidogrel (PLAVIX) 75 mg tablet TK 1 T " PO D    CRESTOR 20 mg tablet Take 10 mg by mouth every evening.     ergocalciferol (ERGOCALCIFEROL) 50,000 unit Cap Take 50,000 Units by mouth every 7 days.      famotidine (PEPCID) 40 MG tablet     furosemide (LASIX) 40 MG tablet Take 1 tablet (40 mg total) by mouth 2 (two) times daily.    gabapentin (NEURONTIN) 300 MG capsule     hydrocodone-acetaminophen 5-325mg (NORCO) 5-325 mg per tablet Take 1 tablet by mouth 3 (three) times daily as needed.     imatinib (GLEEVEC) 400 MG Tab Take 1 tablet (400 mg total) by mouth once daily.    insulin glargine (LANTUS) 100 unit/mL injection Inject 18 Units into the skin every evening.    isosorbide mononitrate (IMDUR) 30 MG 24 hr tablet Take 1 tablet (30 mg total) by mouth once daily.    lisinopril (PRINIVIL,ZESTRIL) 40 MG tablet Take 1 tablet (40 mg total) by mouth once daily.    metoprolol tartrate (LOPRESSOR) 25 MG tablet TK 1 T PO BID    nicotine (NICODERM CQ) 21 mg/24 hr Place 1 patch onto the skin once daily.    nitroGLYCERIN (NITROSTAT) 0.4 MG SL tablet Place 1 tablet (0.4 mg total) under the tongue every 5 (five) minutes as needed for Chest pain.    prednisoLONE acetate (PRED FORTE) 1 % DrpS     ranitidine (ZANTAC) 300 MG tablet TK 1 T PO QHS    ranolazine (RANEXA) 500 MG Tb12 Take 500 mg by mouth 2 (two) times daily.    tiotropium (SPIRIVA) 18 mcg inhalation capsule Inhale 18 mcg into the lungs once daily.      VENTOLIN HFA 90 mcg/actuation HFAA Inhale 2 puffs into the lungs every 4 to 6 hours as needed.     Family History     Problem Relation (Age of Onset)    Asthma Mother    Breast cancer Sister    Cancer Father    Diabetes Mother        Tobacco Use    Smoking status: Current Every Day Smoker     Packs/day: 0.50     Years: 56.00     Pack years: 28.00     Types: Cigarettes    Smokeless tobacco: Current User   Substance and Sexual Activity    Alcohol use: No    Drug use: No    Sexual activity: Never     Review of Systems   Constitutional:  Negative for chills and fever.   HENT: Negative for nosebleeds and tinnitus.    Eyes: Negative for photophobia and visual disturbance.   Respiratory: Positive for cough (non-productive, not worse compared to baseline), shortness of breath and wheezing.    Cardiovascular: Positive for chest pain. Negative for palpitations and leg swelling.   Gastrointestinal: Negative for abdominal distention, nausea and vomiting.   Genitourinary: Negative for dysuria, flank pain and hematuria.   Musculoskeletal: Negative for gait problem and joint swelling.   Skin: Negative for rash and wound.   Neurological: Negative for seizures and syncope.     Objective:     Vital Signs (Most Recent):  Temp: 98 °F (36.7 °C) (06/13/19 1602)  Pulse: 101 (06/13/19 1602)  Resp: (!) 24 (06/13/19 1602)  BP: (!) 134/96 (06/13/19 1602)  SpO2: 100 % (06/13/19 1602) Vital Signs (24h Range):  Temp:  [98 °F (36.7 °C)-98.6 °F (37 °C)] 98 °F (36.7 °C)  Pulse:  [] 101  Resp:  [16-38] 24  SpO2:  [94 %-100 %] 100 %  BP: (134-184)/(73-96) 134/96     Weight: 59.4 kg (131 lb)  Body mass index is 22.49 kg/m².    Physical Exam   Constitutional: She is oriented to person, place, and time. She appears well-developed and well-nourished. No distress.   HENT:   Head: Normocephalic and atraumatic.   Right Ear: External ear normal.   Left Ear: External ear normal.   Eyes: Conjunctivae and EOM are normal. Right eye exhibits no discharge. Left eye exhibits no discharge.   Neck: Normal range of motion. No JVD present. No thyromegaly present.   Cardiovascular: Normal rate and regular rhythm.   No murmur heard.  Pulmonary/Chest: Effort normal. No respiratory distress. She exhibits no tenderness.   Course breath sounds few end expiratory wheezes   Abdominal: Soft. Bowel sounds are normal. She exhibits no distension and no mass. There is no tenderness.   Musculoskeletal: She exhibits no edema or deformity.   Neurological: She is alert and oriented to person, place, and time.    Skin: Skin is warm and dry.   Psychiatric: She has a normal mood and affect. Her behavior is normal.   Nursing note and vitals reviewed.        CRANIAL NERVES     CN III, IV, VI   Extraocular motions are normal.        Significant Labs:   CBC:   Recent Labs   Lab 06/13/19  1400   WBC 8.13   HGB 10.7*   HCT 34.1*        CMP:   Recent Labs   Lab 06/13/19  1400      K 4.9      CO2 25   *   BUN 14   CREATININE 0.9   CALCIUM 10.3   PROT 7.7   ALBUMIN 4.3   BILITOT 0.2   ALKPHOS 79   AST 13   ALT 6*   ANIONGAP 11   EGFRNONAA >60     Cardiac Markers:   Recent Labs   Lab 06/13/19  1400   BNP 1,322*     Troponin:   Recent Labs   Lab 06/13/19  1400   TROPONINI 0.050*       Significant Imaging:   Imaging Results          X-Ray Chest AP Portable (Final result)  Result time 06/13/19 13:31:12    Final result by Apollo Alvarez MD (06/13/19 13:31:12)                 Impression:      1. Coarse interstitial attenuation, similar to the previous examination, no large consolidation.      Electronically signed by: Apollo Alvarez MD  Date:    06/13/2019  Time:    13:31             Narrative:    EXAMINATION:  XR CHEST AP PORTABLE    CLINICAL HISTORY:  shortness of breath;    TECHNIQUE:  Single frontal view of the chest was performed.    COMPARISON:  05/29/2019    FINDINGS:  The cardiomediastinal silhouette is prominent, similar to the previous exam, magnified by technique.  There is calcification of the aorta..  There is no pleural effusion.  The trachea is midline.  The lungs are symmetrically expanded bilaterally with coarse interstitial attenuation, similar to the previous exam..  No large focal consolidation seen.  There is no pneumothorax.  The osseous structures are remarkable for degenerative changes noting dextroscoliotic curvature of the spine..                                  Assessment/Plan:     * Chest pain  Troponin mildly elevated though consistent with baseline, EKG without acute ischemia,  chest x-ray with course interstitial markings. Seems to be due to COPD exacerbation as patient reports developed SOB with chest pain while smoking a cigarette. Chest pain free now  -Troponin trend  -Telemetry  -Aspirin/statin/plavix continue from home  -Continue duo-nebs and PO steroids as chest pain seemed related to COPD.     Systolic congestive heart failure  Previous echo with EF of 35% and diastolic dysfunction  Appears euvolemic on exam. Denies any leg swelling or orthopnea. BNP of 1300 and troponin 0.05. Troponin at baseline.  Will continue home lisinopril, metoprolol, imdur, and ranexa. Continue home lasix as SOB symptoms seem more consistent with COPD.  Daily weights, strict intake and output. Telemetry  Low salt diet.     CML (chronic myelocytic leukemia)  Continue home gleevec. Reports she was told in remission.    Anemia of chronic disease  H&H at baseline. No complaints of active bleeding or indications for transfusion. Will continue to monitor.     Hyperlipidemia  Continue home atorvastatin  Lab Results   Component Value Date    LDLCALC 120.4 03/05/2016           Type 2 diabetes mellitus, uncontrolled  Lab Results   Component Value Date    HGBA1C 8.0 (H) 02/04/2019     Bg of 225 on admit. Start 5 units basal, sliding scale insulin, hypoglycemic protocol, POCT glucose checks, and diabetic diet.    CAD s/p PCI  Chest pain does not seem cardiac in nature, and patient reported it does not feel like previous MI. Will continue home aspirin/plavix/statin/ranexa and metoprolol  Troponin trend  -Telemetry    Tobacco abuse  Greater than 3minutes spent counseling patient on dangers of contuinued tobacco use. Will provide tobacco cessation education.    COPD (chronic obstructive pulmonary disease)  Symptoms today seemed to be due to COPD. Patient reports breathing/chest tightness resovled with duo-neb provided by EMS. Will continue duo-nebs and cautious steroids given diabetes. Patient with few wheezing on  exam.    Hypertension  Poorly controlled. Continue home imdur, amlodipine, clonidine, lasix, metoprolol, lisinopril, and ranexa. PRN clonidine.       VTE Risk Mitigation (From admission, onward)        Ordered     enoxaparin injection 40 mg  Daily      06/13/19 1625     IP VTE HIGH RISK PATIENT  Once      06/13/19 1554     Place sequential compression device  Until discontinued      06/13/19 1554        VTE: lovenox  Code: full  Diet: diabetic  Dispo: pending troponin trend    Christopher Mlcean PA-C  Department of Hospital Medicine   [facility]

## 2019-06-13 NOTE — Clinical Note
Catheter is inserted into the left ventricle. Hemodynamics performed. Wire removed prior to hemodynamics.

## 2019-06-13 NOTE — Clinical Note
Catheter is repositioned to the ostium   right coronary artery. Angiography performed of the right coronary arteries in multiple views. Angiography performed via power injection with 10 mL contrast at 4 mL/s.

## 2019-06-13 NOTE — ASSESSMENT & PLAN NOTE
Lab Results   Component Value Date    HGBA1C 8.0 (H) 02/04/2019     Bg of 225 on admit. Start 5 units basal, sliding scale insulin, hypoglycemic protocol, POCT glucose checks, and diabetic diet.

## 2019-06-13 NOTE — ASSESSMENT & PLAN NOTE
Poorly controlled. Continue home imdur, amlodipine, clonidine, lasix, metoprolol, lisinopril, and ranexa. PRN clonidine.

## 2019-06-13 NOTE — Clinical Note
70 ml injected throughout the case. 70 mL total wasted during the case. 140 mL total used in the case.

## 2019-06-13 NOTE — ASSESSMENT & PLAN NOTE
Chest pain does not seem cardiac in nature, and patient reported it does not feel like previous MI. Will continue home aspirin/plavix/statin/ranexa and metoprolol  Troponin trend  -Telemetry

## 2019-06-14 NOTE — PROGRESS NOTES
AAO X 4. No c/o pain at this time. Resting with eyes closed. NC in place @ 2lpm. Non-skid socks in place and bed alarm active. Call light at side.

## 2019-06-14 NOTE — PROGRESS NOTES
Results for GERONIMO PRATER (MRN [id number]) as of 6/14/2019 03:07   Ref. Range 6/13/2019 19:05   POC PH Latest Ref Range: 7.35 - 7.45  7.254 (LL)   POC PCO2 Latest Ref Range: 35 - 45 mmHg 43.7   POC PO2 Latest Ref Range: 80 - 100 mmHg 92   POC BE Latest Ref Range: -2 to 2 mmol/L -8   POC HCO3 Latest Ref Range: 24 - 28 mmol/L 19.4 (L)   POC SATURATED O2 Latest Ref Range: 95 - 100 % 96   POC TCO2 Latest Ref Range: 23 - 27 mmol/L 21 (L)   Flow Unknown 3   Sample Unknown ARTERIAL   DelSys Unknown Nasal Can   Allens Test Unknown Pass   Site Unknown RR   Mode Unknown SPONT   Bipap ordered 10/5/40%

## 2019-06-14 NOTE — PLAN OF CARE
Problem: Adult Inpatient Plan of Care  Goal: Plan of Care Review  Outcome: Ongoing (interventions implemented as appropriate)  Pt continues scheduled aerosol tx Q4 while awake. Pt wears BIPAP through out day.

## 2019-06-14 NOTE — PLAN OF CARE
Problem: Fall Injury Risk  Goal: Absence of Fall and Fall-Related Injury    Intervention: Identify and Manage Contributors to Fall Injury Risk     06/14/19 0400   Manage Acute Allergic Reaction   Medication Review/Management medications reviewed;high risk medications identified   Identify and Manage Contributors to Fall Injury Risk   Self-Care Promotion independence encouraged     Intervention: Promote Injury-Free Environment     06/14/19 0400   Optimize [place] and Functional Mobility   Environmental Safety Modification assistive device/personal items within reach;clutter free environment maintained;lighting adjusted   Optimize Balance and Safe Activity   Safety Promotion/Fall Prevention Fall Risk signage in place;nonskid shoes/socks when out of bed;side rails raised x 2         Problem: Adjustment to Illness COPD (Chronic Obstructive Pulmonary Disease)  Goal: Optimal Chronic Illness Coping    Intervention: Support and Optimize Psychosocial Response     06/14/19 0400   Promote Anxiety Reduction   Supportive Measures active listening utilized         Problem: Oral Intake Inadequate COPD (Chronic Obstructive Pulmonary Disease)  Goal: Improved Nutrition Intake    Intervention: Promote and Optimize Nutrition     06/14/19 0400   Monitor and Manage Anemia   Oral Nutrition Promotion rest periods promoted         Comments: Pt had c/o SOB at beginning of shift w/ difficulty breathing. Pt was placed on BIPAP and given Ativan and was able to rest w/ comfort. Pt VS were elevated and nightime meds given to address BP. Pt free of falls this shift and was ST w/ PVCs on monitor. Pt troponin increased from 0.05 to 20.57 and MD notified. Pt rested comfortably at bedside.

## 2019-06-14 NOTE — SUBJECTIVE & OBJECTIVE
Interval History: Denies chest pain but reports shortness of breath at rest.     Review of Systems   Constitutional: Negative for chills and fever.   HENT: Negative for nosebleeds and tinnitus.    Eyes: Negative for photophobia and visual disturbance.   Respiratory: Positive for cough (non-productive, not worse compared to baseline), shortness of breath and wheezing.    Cardiovascular: Positive for chest pain. Negative for palpitations and leg swelling.        Currently no chest pain   Gastrointestinal: Negative for abdominal distention, nausea and vomiting.   Genitourinary: Negative for dysuria, flank pain and hematuria.   Musculoskeletal: Negative for gait problem and joint swelling.   Skin: Negative for rash and wound.   Neurological: Negative for seizures and syncope.     Objective:     Vital Signs (Most Recent):  Temp: 96.8 °F (36 °C) (06/14/19 0753)  Pulse: 86 (06/14/19 0755)  Resp: 18 (06/14/19 0755)  BP: (!) 132/97 (06/14/19 0753)  SpO2: 100 % (06/14/19 0755) Vital Signs (24h Range):  Temp:  [96.8 °F (36 °C)-98.6 °F (37 °C)] 96.8 °F (36 °C)  Pulse:  [] 86  Resp:  [16-38] 18  SpO2:  [94 %-100 %] 100 %  BP: (132-218)/() 132/97     Weight: 60.8 kg (134 lb)  Body mass index is 23 kg/m².    Intake/Output Summary (Last 24 hours) at 6/14/2019 1051  Last data filed at 6/13/2019 1700  Gross per 24 hour   Intake --   Output 250 ml   Net -250 ml      Physical Exam   Constitutional: She is oriented to person, place, and time. She appears well-developed. No distress.   Chronic ill appearing   HENT:   Head: Normocephalic and atraumatic.   Eyes: EOM are normal. Right eye exhibits no discharge. Left eye exhibits no discharge.   Neck: Normal range of motion. No JVD present. No thyromegaly present.   Cardiovascular: Normal rate and regular rhythm.   No murmur heard.  Pulmonary/Chest: No respiratory distress. She has wheezes. She exhibits no tenderness.   Course breath sounds few end expiratory wheezes, on 2 L NC    Abdominal: Soft. Bowel sounds are normal. She exhibits no distension and no mass. There is no tenderness.   Musculoskeletal: She exhibits no edema or deformity.   Neurological: She is alert and oriented to person, place, and time.   Skin: Skin is warm and dry.   Psychiatric: She has a normal mood and affect. Her behavior is normal.   Nursing note and vitals reviewed.      Significant Labs: All pertinent labs within the past 24 hours have been reviewed.    Significant Imaging: I have reviewed and interpreted all pertinent imaging results/findings within the past 24 hours.

## 2019-06-14 NOTE — PROGRESS NOTES
No additional medication given to pt white in cathlab procedure. Pt drowsy from medications given on the floor.

## 2019-06-14 NOTE — PROGRESS NOTES
[facility] Hospital Medicine  Progress Note    Patient Name: Susan Howell  MRN: [id number]  Patient Class: IP- Inpatient   Admission Date: 6/13/2019  Length of Stay: 0 days  Attending Physician: Amy Santacruz MD  Primary Care Provider: Linda Mckee MD        Subjective:     Principal Problem:NSTEMI (non-ST elevated myocardial infarction)    Overview/Hospital Course:Susan Howell 74 y.o. female with history for COPD on 2 L home oxygen, CAD sp MI/stent,  Combined systolic diastolic heart failure, HTN, HLD, DMT2 and current smoker is admitted for chest pain found to have NSTEMI and COPD exacerbation.  In the ED, EKG NSR  BNP 1300. Troponin 0.05 up to 23 this am. Plan for LHC with steroid/H1 prep for contrast allergy.     Patient is AAO x4. Unable to reach Daughter Yamila [phone] (busy) and Dejah [phone] (NA).     Interval History: Denies chest pain but reports shortness of breath at rest.     Review of Systems   Constitutional: Negative for chills and fever.   HENT: Negative for nosebleeds and tinnitus.    Eyes: Negative for photophobia and visual disturbance.   Respiratory: Positive for cough (non-productive, not worse compared to baseline), shortness of breath and wheezing.    Cardiovascular: Positive for chest pain. Negative for palpitations and leg swelling.        Currently no chest pain   Gastrointestinal: Negative for abdominal distention, nausea and vomiting.   Genitourinary: Negative for dysuria, flank pain and hematuria.   Musculoskeletal: Negative for gait problem and joint swelling.   Skin: Negative for rash and wound.   Neurological: Negative for seizures and syncope.     Objective:     Vital Signs (Most Recent):  Temp: 96.8 °F (36 °C) (06/14/19 0753)  Pulse: 86 (06/14/19 0755)  Resp: 18 (06/14/19 0755)  BP: (!) 132/97 (06/14/19 0753)  SpO2: 100 % (06/14/19 0755) Vital Signs (24h Range):  Temp:  [96.8 °F (36 °C)-98.6 °F (37 °C)] 96.8 °F (36 °C)  Pulse:  [] 86  Resp:   [16-38] 18  SpO2:  [94 %-100 %] 100 %  BP: (132-218)/() 132/97     Weight: 60.8 kg (134 lb)  Body mass index is 23 kg/m².    Intake/Output Summary (Last 24 hours) at 6/14/2019 1051  Last data filed at 6/13/2019 1700  Gross per 24 hour   Intake --   Output 250 ml   Net -250 ml      Physical Exam   Constitutional: She is oriented to person, place, and time. She appears well-developed. No distress.   Chronic ill appearing   HENT:   Head: Normocephalic and atraumatic.   Eyes: EOM are normal. Right eye exhibits no discharge. Left eye exhibits no discharge.   Neck: Normal range of motion. No JVD present. No thyromegaly present.   Cardiovascular: Normal rate and regular rhythm.   No murmur heard.  Pulmonary/Chest: No respiratory distress. She has wheezes. She exhibits no tenderness.   Course breath sounds few end expiratory wheezes, on 2 L NC   Abdominal: Soft. Bowel sounds are normal. She exhibits no distension and no mass. There is no tenderness.   Musculoskeletal: She exhibits no edema or deformity.   Neurological: She is alert and oriented to person, place, and time.   Skin: Skin is warm and dry.   Psychiatric: She has a normal mood and affect. Her behavior is normal.   Nursing note and vitals reviewed.      Significant Labs: All pertinent labs within the past 24 hours have been reviewed.    Significant Imaging: I have reviewed and interpreted all pertinent imaging results/findings within the past 24 hours.        Assessment/Plan:      * NSTEMI (non-ST elevated myocardial infarction)  Patient with history CAD MI 2 vessel disease sp stent mid LAD 10/28/2014. LHC 2014 also showed Lcx 70%, LM 30%.   Lovenox full dose given last night.   Cardiology reload plavix due to non compliance. Iodine allergy already for solumedrol.  Continue ACEI, BB, statin.    Plan for [facility] today.       Chest pain/NSTEMI        Combined Systolic diastolic congestive heart failure  Previous echo with EF of 35% and diastolic  dysfunction  Appears euvolemic on exam. Denies any leg swelling or orthopnea. BNP of 1300   Continue home lisinopril, metoprolol, imdur, and ranexa. Continue home lasix   Repeat 2 D echo pending.       CML (chronic myelocytic leukemia)  Continue home gleevec. Reports she was told in remission.    Anemia of chronic disease  H&H at baseline. No complaints of active bleeding or indications for transfusion. Will continue to monitor.     Hyperlipidemia  Continue home atorvastatin  Lab Results   Component Value Date    LDLCALC 120.4 03/05/2016           Type 2 diabetes mellitus, uncontrolled  Lab Results   Component Value Date    HGBA1C 7.5 (H) 06/13/2019   Continue 5 units basal, sliding scale insulin      Tobacco abuse  Greater than 3minutes spent counseling patient on dangers of contuinued tobacco use. Will provide tobacco cessation education.    COPD (chronic obstructive pulmonary disease)  Continue oxygen,  duoneb and steroid. Resume advair, spiriva, IS.     Hypertension  Poorly controlled. Continue home imdur, amlodipine, clonidine, lasix, metoprolol, lisinopril, and ranexa. PRN clonidine.       VTE Risk Mitigation (From admission, onward)        Ordered     IP VTE HIGH RISK PATIENT  Once      06/13/19 1554     Place sequential compression device  Until discontinued      06/13/19 9354                Aye Christian NP  Department of Hospital Medicine   [facility]

## 2019-06-14 NOTE — CARE UPDATE
Cross-Cover Progress Note    I was called to Ms. Susan Howell's bedside by OSIRIS Keating, to evaluate her for worsening respiratory status.  She was admitted earlier today for COPD exacerbation and evaluation for chest pain.    On my evaluation she was in severe respiratory distress.  She was with increased respiratory effort but interestingly was still able to speak in full sentences.  We obtained an ABG which was significant for 7.254  43.7  92  19.4 on nasal cannula at 3 liters/minute.  She typically wears 2-2.5 liters/minute as-needed at home.  Her oxygen saturations were in the high-90's.      On respiratory exam she was tachypneic with good air movement throughout and with a faint expiratory wheezing.  On cardiac exam she was tachycardic but regular.    Although I would do think that her COPD is contributing to her respiratory distress, I feel that anxiety is also playing a significant role.  She will be placed on BPAP for increased work of breathing and may potentially need anxiolytic medications.    I will continue monitor the patient's progress throughout the night.          Addendum 0120  I was called regarding an increase in her troponin level from 0.050 to 0.435 without any chest pains currently.  Will treat as NSTEMI.  She is already on aspirin, high-dose statin, clopidogrel, lisinopril, and metoprolol.  Will change her DVT prophylaxis dose enoxaparin to full treatment dose and obtain a TTE in the morning.  Cardiology is already consulted in the morning.          Critical Care Time: 30 minutes.          Nataly Gold M.D.  Staff Nocturnist  Department of Hospital Medicine  [facility]  Pager: [phone]

## 2019-06-14 NOTE — SUBJECTIVE & OBJECTIVE
"Past Medical History:   Diagnosis Date    Asthma     Cancer 10/10/2014    Bone cancer     CHF (congestive heart failure)     Cigarette smoker     COPD (chronic obstructive pulmonary disease)     Diabetes mellitus     Diabetes mellitus type II     Emphysema of lung     [place] (hard of hearing)     Hypercholesteremia     Hypertension     Myocardial infarct 2013    On home oxygen therapy     Palpitations     Pneumonia 2019    PVD (peripheral vascular disease)     Seizures     Stroke     Unsteady gait     uses a cane & walker       Past Surgical History:   Procedure Laterality Date    APPENDECTOMY       SECTION      CHOLECYSTECTOMY      HYSTERECTOMY      VASCULAR SURGERY      stent in L leg; unable to place in R leg d/t blockage       Review of patient's allergies indicates:   Allergen Reactions    Morphine      Pt states, "I'm not allergic to morphine they gave me too much at [facility]. Instead of giving me 2mg she gave me 5mg and I was almost dead."     Codeine      Blisters vs sweling (pt unsure which one)    Iodinated contrast- oral and iv dye     Iodine containing multivitamin      Blisters vs swelling (pt unsure which one.)    Lidocaine        No current facility-administered medications on file prior to encounter.      Current Outpatient Medications on File Prior to Encounter   Medication Sig    acetaminophen (TYLENOL) 325 MG tablet Take 2 tablets (650 mg total) by mouth every 4 (four) hours as needed for Pain or Temperature greater than (100).    ADVAIR DISKUS 100-50 mcg/dose diskus inhaler Take 1 puff by mouth Twice daily.    alendronate (FOSAMAX) 70 MG tablet Take 70 mg by mouth.    amlodipine (NORVASC) 10 MG tablet Take 1 tablet (10 mg total) by mouth once daily.    ANORO ELLIPTA 62.5-25 mcg/actuation DsDv     aspirin (ECOTRIN) 81 MG EC tablet Take 81 mg by mouth once daily.      atorvastatin (LIPITOR) 80 MG tablet TK 1 T PO D    BD ULTRA-FINE ANISHA PEN " "NEEDLE 32 gauge x 5/32" Ndle use for insulin up to FOUR TIMES DAILY    BIDIL 20-37.5 mg Tab TK 1 T PO TID    calcium-vitamin D (CALCIUM-VITAMIN D) 500 mg(1,250mg) -200 unit per tablet Take 1 tablet by mouth 2 (two) times daily with meals.      cloNIDine (CATAPRES) 0.1 MG tablet Take 1 tablet (0.1 mg total) by mouth 2 (two) times daily.    clopidogrel (PLAVIX) 75 mg tablet TK 1 T PO D    CRESTOR 20 mg tablet Take 10 mg by mouth every evening.     ergocalciferol (ERGOCALCIFEROL) 50,000 unit Cap Take 50,000 Units by mouth every 7 days.      famotidine (PEPCID) 40 MG tablet     furosemide (LASIX) 40 MG tablet Take 1 tablet (40 mg total) by mouth 2 (two) times daily.    gabapentin (NEURONTIN) 300 MG capsule     hydrocodone-acetaminophen 5-325mg (NORCO) 5-325 mg per tablet Take 1 tablet by mouth 3 (three) times daily as needed.     imatinib (GLEEVEC) 400 MG Tab Take 1 tablet (400 mg total) by mouth once daily.    insulin glargine (LANTUS) 100 unit/mL injection Inject 18 Units into the skin every evening.    isosorbide mononitrate (IMDUR) 30 MG 24 hr tablet Take 1 tablet (30 mg total) by mouth once daily.    lisinopril (PRINIVIL,ZESTRIL) 40 MG tablet Take 1 tablet (40 mg total) by mouth once daily.    metoprolol tartrate (LOPRESSOR) 25 MG tablet TK 1 T PO BID    nicotine (NICODERM CQ) 21 mg/24 hr Place 1 patch onto the skin once daily.    nitroGLYCERIN (NITROSTAT) 0.4 MG SL tablet Place 1 tablet (0.4 mg total) under the tongue every 5 (five) minutes as needed for Chest pain.    prednisoLONE acetate (PRED FORTE) 1 % DrpS     ranitidine (ZANTAC) 300 MG tablet TK 1 T PO QHS    ranolazine (RANEXA) 500 MG Tb12 Take 500 mg by mouth 2 (two) times daily.    tiotropium (SPIRIVA) 18 mcg inhalation capsule Inhale 18 mcg into the lungs once daily.      VENTOLIN HFA 90 mcg/actuation HFAA Inhale 2 puffs into the lungs every 4 to 6 hours as needed.     Family History     Problem Relation (Age of Onset)    Asthma " Mother    Breast cancer Sister    Cancer Father    Diabetes Mother        Tobacco Use    Smoking status: Current Every Day Smoker     Packs/day: 0.50     Years: 56.00     Pack years: 28.00     Types: Cigarettes    Smokeless tobacco: Current User   Substance and Sexual Activity    Alcohol use: No    Drug use: No    Sexual activity: Never     Review of Systems   Constitution: Negative for decreased appetite.   HENT: Negative for ear discharge.    Eyes: Negative for blurred vision.   Respiratory: Negative for hemoptysis.    Endocrine: Negative for polyphagia.   Hematologic/Lymphatic: Negative for adenopathy.   Skin: Negative for color change.   Musculoskeletal: Negative for joint swelling.   Genitourinary: Negative for bladder incontinence.   Neurological: Negative for brief paralysis.   Psychiatric/Behavioral: Negative for hallucinations.   Allergic/Immunologic: Negative for hives.     Objective:     Vital Signs (Most Recent):  Temp: 96.8 °F (36 °C) (06/14/19 0753)  Pulse: 86 (06/14/19 0755)  Resp: 18 (06/14/19 0755)  BP: (!) 132/97 (06/14/19 0753)  SpO2: 100 % (06/14/19 0755) Vital Signs (24h Range):  Temp:  [96.8 °F (36 °C)-98.6 °F (37 °C)] 96.8 °F (36 °C)  Pulse:  [] 86  Resp:  [16-38] 18  SpO2:  [94 %-100 %] 100 %  BP: (132-218)/() 132/97     Weight: 61.2 kg (134 lb 14.7 oz)  Body mass index is 23.16 kg/m².    SpO2: 100 %  O2 Device (Oxygen Therapy): nasal cannula      Intake/Output Summary (Last 24 hours) at 6/14/2019 0836  Last data filed at 6/13/2019 1700  Gross per 24 hour   Intake --   Output 250 ml   Net -250 ml       Lines/Drains/Airways     Peripheral Intravenous Line                 Peripheral IV - Single Lumen 06/13/19 20 G Left Forearm 1 day                Physical Exam   Constitutional: She is oriented to person, place, and time. She appears well-developed and well-nourished.   HENT:   Head: Normocephalic and atraumatic.   Eyes: Pupils are equal, round, and reactive to light.  Conjunctivae are normal.   Neck: Normal range of motion. Neck supple.   Cardiovascular: Normal rate, normal heart sounds and intact distal pulses.   Pulmonary/Chest: Effort normal. She has wheezes.   Abdominal: Soft. Bowel sounds are normal.   Musculoskeletal: Normal range of motion.   Neurological: She is alert and oriented to person, place, and time.   Skin: Skin is warm and dry.       Significant Labs: All pertinent lab results from the last 24 hours have been reviewed.    Significant Imaging: Echocardiogram:   2D echo with color flow doppler:   Results for orders placed or performed during the hospital encounter of 03/03/16   2D echo with color flow doppler   Result Value Ref Range    QEF 40 (A) 55 - 65    Mitral Valve Regurgitation TRIVIAL     Diastolic Dysfunction Yes (A)     Est. PA Systolic Pressure 30.47     Pericardial Effusion NONE     Mitral Valve Mobility NORMAL     Tricuspid Valve Regurgitation TRIVIAL

## 2019-06-14 NOTE — NURSING
"Received report from JACQUELINE Neal RN. Pt alert and oriented with no c/o pain, but SOB. Pt sitting in tripod position stating "I can't breathe". RT and bedside and MD notified. Telemetry monitor in place. Saline lock in place to site is clear. Pt  informed of md orders, oriented to environment and safety maintained with bed low side rails up x2 with nurse call bell within reach and bed alarm set      "

## 2019-06-14 NOTE — ASSESSMENT & PLAN NOTE
Known ischemic CM - EF 35%. Occluded RCA and Hx Cx stent 2014. Troponin increased to 20. Denies CP. Discussed with Dr Mathews who requests I do LHC. Iodine allergy already on IV solumedrol. Reload plavix with possible medical noncompliance. LHC today - afternoon - received lovenox and 0500. Risks/benefits of LHC explained and she agrees to proceed. Repeat echo

## 2019-06-14 NOTE — ASSESSMENT & PLAN NOTE
Patient with history CAD MI 2 vessel disease sp stent mid LAD 10/28/2014. LHC 2014 also showed Lcx 70%, LM 30%.   Lovenox full dose given last night.   Cardiology reload plavix due to non compliance. Iodine allergy already for solumedrol.  Continue ACEI, BB, statin.    Plan for [facility] today.

## 2019-06-14 NOTE — HPI
HPI: Susan Howell 74 y.o. female with COPD, CAD, HTN, HLD, DM2, and tobacco abuse presents to the hospital with a chief complaint of SOB. She reports yesterday she became SOB, but today the symptoms worsened today while smoking a cigarette. She felt very anxious and she then developed left sided chest tightness that was without radiation and resolved with treatment by EMS. She states they gave her a breathing treatment. She also self treated the chest pain with nitro/aspirin at home. She states this pain does not feel like a previous MI. She states her chest pain is completely resolved. Her breathing has improved with duo-nebs and steroids improved. She endorses a non-productive cough that is not worsened compared to her baseline. She denies fever, N/V, abdominal pain, syncope, dizziness, orthopnea, leg swelling, and dysuria.      In the ED, BNP 1300, troponin 0.05 at baseline, chest x-ray without coarse interstitial markings, and EKG of NSR with LVH.  Troponin increased to 0.4 then 20  Still with SOB, denies CP  Admits to some noncompliance with plavix    Echo 2/5/19  · Moderately decreased left ventricular systolic function. The estimated ejection fraction is 35%  · Moderate left ventricular enlargement.  · Eccentric left ventricular hypertrophy.  · Grade II (moderate) left ventricular diastolic dysfunction consistent with pseudonormalization.  · Normal right ventricular systolic function.  · Moderate left atrial enlargement.  · Mild-to-moderate mitral regurgitation.    Seen by Dr Mathews in the past - last 5/2015     10/28/14:  Patient has a right dominant coronary artery.     - Left Main Coronary Artery:  The proximal LM has a 30% stenosis. There is JCARLOS 3 flow.    - Left Anterior Descending Artery:  The mid LAD has a 70% stenosis. There is JCARLOS 3 flow.    - Left Circumflex Artery:  The mid LCX has a 70% stenosis. There is JCARLOS 3 flow.    - Right Coronary Artery:  The RCA was not studied. Known to be occluded    -  Common Femoral Artery:  The left CFA is normal.    INTERVENTION:    Mid LCX:  The lesion was successfully intervened. Post-stenosis of 0% and post-JCARLOS 3 flow. The vessel was accessed natively. The following items were used: Balloon Emerge 3.0 X12 and Stent Promus Premier 3.0 X16 (SAM).    C. SUMMARY:    1. Two vessel coronary artery disease.  2. Normal LVEF.  3. Successful PCI.

## 2019-06-14 NOTE — ASSESSMENT & PLAN NOTE
Previous echo with EF of 35% and diastolic dysfunction  Appears euvolemic on exam. Denies any leg swelling or orthopnea. BNP of 1300   Continue home lisinopril, metoprolol, imdur, and ranexa. Continue home lasix   Repeat 2 D echo pending.

## 2019-06-14 NOTE — BRIEF OP NOTE
[facility]  Brief Operative Note     SUMMARY     Surgery Date: 6/14/2019     Surgeon(s) and Role:     * Fermin Healy MD - Primary    Assisting Surgeon: None    Pre-op Diagnosis:  NSTEMI (non-ST elevated myocardial infarction) [I21.4]    Post-op Diagnosis:  Post-Op Diagnosis Codes:     * NSTEMI (non-ST elevated myocardial infarction) [I21.4]    Procedure(s) (LRB):  Left heart cath (Left)    Anesthesia: RN IV Sedation    Description of the findings of the procedure: uneventful LHC/PCI LCx via R radial    Findings/Key Components:  LVEDP: 12mmHg  LVEF: 35%    Dominance: Right  LM: MLI  LAD: diffuse noncritical disease, mid stent patent  LCx: prox 80% (prox stent margin), mid stent patent with dist 90% stent margin, dist 50%  RCA: ost , collateralized L-R via septals    PCI LCx:  Preop ASA/Plavix, intraop heparin  Predil 3.0x20  Stent 3.5x38 Resolute SAM 18 tarah  Excellent result, T3 flow, 0% residual stenosis    Hemostasis:  R Radial band    Impression:  NSTEMI  3V CAD, mod LV dysfxn   RCA  Patent LAD stent  Patent mid LCx stent with severe stent margin stenosis  Successful prox-mid LCx 3.5x38 Resolute SAM  R rad vasband for hemostasis    Plan:  Observe  ASA 81mg qd   Plavix 75mg qd for 1 year (thru 6/2020)  BBl/ACEi/Statin  Home in am  Follow up with Dr. Turcios    Estimated Blood Loss: <50cc         Specimens: None

## 2019-06-14 NOTE — PROCEDURES
Procedures     Pre-sedation Assessment:    1. ASA Score: ASA 3 - Patient with moderate systemic disease with functional limitations  2. Mallampati Class: II (hard and soft palate, upper portion of tonsils anduvula visible)  3. Patient or family history of any reaction to anesthesia or sedation: No  4. Plan for Sedation: Moderate  5. H&P within 30 days of the procedure and updated within 24 hrs of admission or registration: Yes

## 2019-06-14 NOTE — NURSING
Bedside Report given to night nurse. Walking rounds completed. Visualized and assessed patient. PA and Nocturnist at bedside assessing patient. Safety precautions maintained and call light within reach.    Chart check completed.

## 2019-06-14 NOTE — CONSULTS
[facility]  Cardiology  Consult Note    Patient Name: Susan Howell  MRN: [id number]  Admission Date: 6/13/2019  Hospital Length of Stay: 0 days  Code Status: Full Code   Attending Provider: Amy Santacruz MD   Consulting Provider: Job Turcios MD  Primary Care Physician: Linda Mckee MD  Principal Problem:Chest pain    Patient information was obtained from patient and ER records.     Consults  Subjective:     Chief Complaint:  NSTEMI     HPI: Susan Howell 74 y.o. female with COPD, CAD, HTN, HLD, DM2, and tobacco abuse presents to the hospital with a chief complaint of SOB. She reports yesterday she became SOB, but today the symptoms worsened today while smoking a cigarette. She felt very anxious and she then developed left sided chest tightness that was without radiation and resolved with treatment by EMS. She states they gave her a breathing treatment. She also self treated the chest pain with nitro/aspirin at home. She states this pain does not feel like a previous MI. She states her chest pain is completely resolved. Her breathing has improved with duo-nebs and steroids improved. She endorses a non-productive cough that is not worsened compared to her baseline. She denies fever, N/V, abdominal pain, syncope, dizziness, orthopnea, leg swelling, and dysuria.      In the ED, BNP 1300, troponin 0.05 at baseline, chest x-ray without coarse interstitial markings, and EKG of NSR with LVH.  Troponin increased to 0.4 then 20  Still with SOB, denies CP  Admits to some noncompliance with plavix    Echo 2/5/19  · Moderately decreased left ventricular systolic function. The estimated ejection fraction is 35%  · Moderate left ventricular enlargement.  · Eccentric left ventricular hypertrophy.  · Grade II (moderate) left ventricular diastolic dysfunction consistent with pseudonormalization.  · Normal right ventricular systolic function.  · Moderate left atrial enlargement.  · Mild-to-moderate mitral  "regurgitation.    Seen by Dr Mathews in the past - last 2015     10/28/14:  Patient has a right dominant coronary artery.     - Left Main Coronary Artery:  The proximal LM has a 30% stenosis. There is JCARLOS 3 flow.    - Left Anterior Descending Artery:  The mid LAD has a 70% stenosis. There is JCARLOS 3 flow.    - Left Circumflex Artery:  The mid LCX has a 70% stenosis. There is JCARLOS 3 flow.    - Right Coronary Artery:  The RCA was not studied. Known to be occluded    - Common Femoral Artery:  The left CFA is normal.    INTERVENTION:    Mid LCX:  The lesion was successfully intervened. Post-stenosis of 0% and post-JCARLOS 3 flow. The vessel was accessed natively. The following items were used: Balloon Emerge 3.0 X12 and Stent Promus Premier 3.0 X16 (SAM).    C. SUMMARY:    1. Two vessel coronary artery disease.  2. Normal LVEF.  3. Successful PCI.    Past Medical History:   Diagnosis Date    Asthma     Cancer 10/10/2014    Bone cancer     CHF (congestive heart failure)     Cigarette smoker     COPD (chronic obstructive pulmonary disease)     Diabetes mellitus     Diabetes mellitus type II     Emphysema of lung     [place] (hard of hearing)     Hypercholesteremia     Hypertension     Myocardial infarct 2013    On home oxygen therapy     Palpitations     Pneumonia 2019    PVD (peripheral vascular disease)     Seizures     Stroke     Unsteady gait     uses a cane & walker       Past Surgical History:   Procedure Laterality Date    APPENDECTOMY       SECTION      CHOLECYSTECTOMY      HYSTERECTOMY      VASCULAR SURGERY      stent in L leg; unable to place in R leg d/t blockage       Review of patient's allergies indicates:   Allergen Reactions    Morphine      Pt states, "I'm not allergic to morphine they gave me too much at [facility]. Instead of giving me 2mg she gave me 5mg and I was almost dead."     Codeine      Blisters vs sweling (pt unsure which one)    Iodinated contrast- oral " "and iv dye     Iodine containing multivitamin      Blisters vs swelling (pt unsure which one.)    Lidocaine        No current facility-administered medications on file prior to encounter.      Current Outpatient Medications on File Prior to Encounter   Medication Sig    acetaminophen (TYLENOL) 325 MG tablet Take 2 tablets (650 mg total) by mouth every 4 (four) hours as needed for Pain or Temperature greater than (100).    ADVAIR DISKUS 100-50 mcg/dose diskus inhaler Take 1 puff by mouth Twice daily.    alendronate (FOSAMAX) 70 MG tablet Take 70 mg by mouth.    amlodipine (NORVASC) 10 MG tablet Take 1 tablet (10 mg total) by mouth once daily.    ANORO ELLIPTA 62.5-25 mcg/actuation DsDv     aspirin (ECOTRIN) 81 MG EC tablet Take 81 mg by mouth once daily.      atorvastatin (LIPITOR) 80 MG tablet TK 1 T PO D    BD ULTRA-FINE ANISHA PEN NEEDLE 32 gauge x 5/32" Ndle use for insulin up to FOUR TIMES DAILY    BIDIL 20-37.5 mg Tab TK 1 T PO TID    calcium-vitamin D (CALCIUM-VITAMIN D) 500 mg(1,250mg) -200 unit per tablet Take 1 tablet by mouth 2 (two) times daily with meals.      cloNIDine (CATAPRES) 0.1 MG tablet Take 1 tablet (0.1 mg total) by mouth 2 (two) times daily.    clopidogrel (PLAVIX) 75 mg tablet TK 1 T PO D    CRESTOR 20 mg tablet Take 10 mg by mouth every evening.     ergocalciferol (ERGOCALCIFEROL) 50,000 unit Cap Take 50,000 Units by mouth every 7 days.      famotidine (PEPCID) 40 MG tablet     furosemide (LASIX) 40 MG tablet Take 1 tablet (40 mg total) by mouth 2 (two) times daily.    gabapentin (NEURONTIN) 300 MG capsule     hydrocodone-acetaminophen 5-325mg (NORCO) 5-325 mg per tablet Take 1 tablet by mouth 3 (three) times daily as needed.     imatinib (GLEEVEC) 400 MG Tab Take 1 tablet (400 mg total) by mouth once daily.    insulin glargine (LANTUS) 100 unit/mL injection Inject 18 Units into the skin every evening.    isosorbide mononitrate (IMDUR) 30 MG 24 hr tablet Take 1 tablet " (30 mg total) by mouth once daily.    lisinopril (PRINIVIL,ZESTRIL) 40 MG tablet Take 1 tablet (40 mg total) by mouth once daily.    metoprolol tartrate (LOPRESSOR) 25 MG tablet TK 1 T PO BID    nicotine (NICODERM CQ) 21 mg/24 hr Place 1 patch onto the skin once daily.    nitroGLYCERIN (NITROSTAT) 0.4 MG SL tablet Place 1 tablet (0.4 mg total) under the tongue every 5 (five) minutes as needed for Chest pain.    prednisoLONE acetate (PRED FORTE) 1 % DrpS     ranitidine (ZANTAC) 300 MG tablet TK 1 T PO QHS    ranolazine (RANEXA) 500 MG Tb12 Take 500 mg by mouth 2 (two) times daily.    tiotropium (SPIRIVA) 18 mcg inhalation capsule Inhale 18 mcg into the lungs once daily.      VENTOLIN HFA 90 mcg/actuation HFAA Inhale 2 puffs into the lungs every 4 to 6 hours as needed.     Family History     Problem Relation (Age of Onset)    Asthma Mother    Breast cancer Sister    Cancer Father    Diabetes Mother        Tobacco Use    Smoking status: Current Every Day Smoker     Packs/day: 0.50     Years: 56.00     Pack years: 28.00     Types: Cigarettes    Smokeless tobacco: Current User   Substance and Sexual Activity    Alcohol use: No    Drug use: No    Sexual activity: Never     Review of Systems   Constitution: Negative for decreased appetite.   HENT: Negative for ear discharge.    Eyes: Negative for blurred vision.   Respiratory: Negative for hemoptysis.    Endocrine: Negative for polyphagia.   Hematologic/Lymphatic: Negative for adenopathy.   Skin: Negative for color change.   Musculoskeletal: Negative for joint swelling.   Genitourinary: Negative for bladder incontinence.   Neurological: Negative for brief paralysis.   Psychiatric/Behavioral: Negative for hallucinations.   Allergic/Immunologic: Negative for hives.     Objective:     Vital Signs (Most Recent):  Temp: 96.8 °F (36 °C) (06/14/19 0753)  Pulse: 86 (06/14/19 0755)  Resp: 18 (06/14/19 0755)  BP: (!) 132/97 (06/14/19 0753)  SpO2: 100 % (06/14/19 0755)  Vital Signs (24h Range):  Temp:  [96.8 °F (36 °C)-98.6 °F (37 °C)] 96.8 °F (36 °C)  Pulse:  [] 86  Resp:  [16-38] 18  SpO2:  [94 %-100 %] 100 %  BP: (132-218)/() 132/97     Weight: 61.2 kg (134 lb 14.7 oz)  Body mass index is 23.16 kg/m².    SpO2: 100 %  O2 Device (Oxygen Therapy): nasal cannula      Intake/Output Summary (Last 24 hours) at 6/14/2019 0836  Last data filed at 6/13/2019 1700  Gross per 24 hour   Intake --   Output 250 ml   Net -250 ml       Lines/Drains/Airways     Peripheral Intravenous Line                 Peripheral IV - Single Lumen 06/13/19 20 G Left Forearm 1 day                Physical Exam   Constitutional: She is oriented to person, place, and time. She appears well-developed and well-nourished.   HENT:   Head: Normocephalic and atraumatic.   Eyes: Pupils are equal, round, and reactive to light. Conjunctivae are normal.   Neck: Normal range of motion. Neck supple.   Cardiovascular: Normal rate, normal heart sounds and intact distal pulses.   Pulmonary/Chest: Effort normal. She has wheezes.   Abdominal: Soft. Bowel sounds are normal.   Musculoskeletal: Normal range of motion.   Neurological: She is alert and oriented to person, place, and time.   Skin: Skin is warm and dry.       Significant Labs: All pertinent lab results from the last 24 hours have been reviewed.    Significant Imaging: Echocardiogram:   2D echo with color flow doppler:   Results for orders placed or performed during the hospital encounter of 03/03/16   2D echo with color flow doppler   Result Value Ref Range    QEF 40 (A) 55 - 65    Mitral Valve Regurgitation TRIVIAL     Diastolic Dysfunction Yes (A)     Est. PA Systolic Pressure 30.47     Pericardial Effusion NONE     Mitral Valve Mobility NORMAL     Tricuspid Valve Regurgitation TRIVIAL      Assessment and Plan:     Systolic congestive heart failure  Diuresis and afterload reduction as tolerated. Repeat echo    NSTEMI (non-ST elevated myocardial  infarction)  Known ischemic CM - EF 35%. Occluded RCA and Hx Cx stent 2014. Troponin increased to 20. Denies CP. Discussed with Dr Mathews who requests I do LHC. Iodine allergy already on IV solumedrol. Reload plavix with possible medical noncompliance. LHC today - afternoon - received lovenox and 0500. Risks/benefits of LHC explained and she agrees to proceed. Repeat echo    Hyperlipidemia  On statin    Type 2 diabetes mellitus, uncontrolled  Per primary    CAD s/p PCI  As above    Tobacco abuse  counseled    COPD (chronic obstructive pulmonary disease)  Per primary    Hypertension  Stable on current meds        VTE Risk Mitigation (From admission, onward)        Ordered     IP VTE HIGH RISK PATIENT  Once      06/13/19 1554     Place sequential compression device  Until discontinued      06/13/19 1554          Thank you for your consult. I will follow-up with patient. Please contact us if you have any additional questions.    Job Turcios MD  Cardiology   [facility] [place]

## 2019-06-14 NOTE — ASSESSMENT & PLAN NOTE
Lab Results   Component Value Date    HGBA1C 7.5 (H) 06/13/2019   Continue 5 units basal, sliding scale insulin

## 2019-06-14 NOTE — PLAN OF CARE
Problem: Adult Inpatient Plan of Care  Goal: Plan of Care Review  Outcome: Ongoing (interventions implemented as appropriate)     06/13/19 1929   Plan of Care Review   Plan of Care Reviewed With patient     Goal: Absence of Hospital-Acquired Illness or Injury    Intervention: Prevent VTE (venous thromboembolism)     06/13/19 1929   Prevent or Manage Embolism   VTE Prevention/Management bleeding risk assessed;bleeding precautions maintained;ROM (active) performed         Problem: Fall Injury Risk  Goal: Absence of Fall and Fall-Related Injury    Intervention: Promote Injury-Free Environment     06/13/19 1900   Optimize Balance and Safe Activity   Safety Promotion/Fall Prevention bed alarm set;commode/urinal/bedpan at bedside;Fall Risk reviewed with patient/family;high risk medications identified;lighting adjusted;medications reviewed;nonskid shoes/socks when out of bed         Problem: Adjustment to Illness COPD (Chronic Obstructive Pulmonary Disease)  Goal: Optimal Chronic Illness Coping    Intervention: Support and Optimize Psychosocial Response     06/13/19 1929   Promote Anxiety Reduction   Supportive Measures positive reinforcement provided;self-care encouraged;self-reflection promoted;verbalization of feelings encouraged         Problem: Oral Intake Inadequate COPD (Chronic Obstructive Pulmonary Disease)  Goal: Improved Nutrition Intake    Intervention: Promote and Optimize Nutrition     06/13/19 1929   Monitor and Manage Anemia   Oral Nutrition Promotion calorie dense foods provided;calorie dense liquids provided;rest periods promoted         Problem: Respiratory Compromise COPD (Chronic Obstructive Pulmonary Disease)  Goal: Effective Oxygenation and Ventilation    Intervention: Optimize Oxygenation and Ventilation     06/13/19 1929   Support Asthma Symptom Control   Airway/Ventilation Management airway patency maintained;calming measures promoted

## 2019-06-15 NOTE — PLAN OF CARE
[facility]    HOME HEALTH ORDERS  FACE TO FACE ENCOUNTER    Patient Name: Susan Howell  YOB: 1945    PCP: Linda Mckee MD   PCP Address: [address] [place] [address] [address] / [place] [address]  PCP Phone Number: [phone]  PCP Fax: [phone]    Encounter Date: 06/15/2019    Admit to Home Health    Diagnoses:  Active Hospital Problems    Diagnosis  POA    *NSTEMI (non-ST elevated myocardial infarction) [I21.4]  Yes    Combined Systolic diastolic congestive heart failure [I50.20]  Unknown    CML (chronic myelocytic leukemia) [C92.10]  Yes    Anemia of chronic disease [D63.8]  Yes     Chronic    Hyperlipidemia [E78.5]  Yes     Chronic    Type 2 diabetes mellitus, uncontrolled [E11.65]  Yes     Chronic    CAD s/p PCI [I25.10]  Yes     Chronic    Tobacco abuse [Z72.0]  Yes     Chronic    COPD (chronic obstructive pulmonary disease) [J44.9]  Yes     Chronic    Hypertension [I10]  Yes     Chronic      Resolved Hospital Problems   No resolved problems to display.       Future Appointments   Date Time Provider Department Center   6/26/2019  1:00 PM Job Turcios MD [facility] CARDIO [facility]   7/8/2019  9:40 AM Flaca Schofield MD [facility] HEM ONC [facility]     Follow-up Information     Linda Mckee MD In 1 week.    Specialty:  General Practice  Why:  Please call to schedule your PCP appt in 1 week   Contact information:  [address] [place] [address]  [address]  [place] [address]  [phone]             Job Turcios MD On 6/26/2019.    Specialty:  Cardiology  Why:  Cardiology appt on Wednesday @ 1:00pm, outpatient services  Contact information:  [address]  [address]  [place] [address]  [phone]             Lnida Mckee MD.    Specialty:  General Practice  Contact information:  [address] [place] [address]  [address]  [place] [address]  [phone]                     I have seen and examined this patient face to face today. My clinical  "findings that support the need for the home health skilled services and home bound status are the following:  Weakness/numbness causing balance and gait disturbance due to Coronary Heart Disease making it taxing to leave home.    Allergies:  Review of patient's allergies indicates:   Allergen Reactions    Morphine      Pt states, "I'm not allergic to morphine they gave me too much at [facility]. Instead of giving me 2mg she gave me 5mg and I was almost dead."     Codeine      Blisters vs sweling (pt unsure which one)    Iodinated contrast- oral and iv dye     Iodine containing multivitamin      Blisters vs swelling (pt unsure which one.)    Lidocaine        Diet: cardiac diet    Activities: activity as tolerated    Nursing:   SN to complete comprehensive assessment including routine vital signs. Instruct on disease process and s/s of complications to report to MD. Review/verify medication list sent home with the patient at time of discharge  and instruct patient/caregiver as needed. Frequency may be adjusted depending on start of care date.    Notify MD if SBP > 160 or < 90; DBP > 90 or < 50; HR > 120 or < 50; Temp > 101; Other:         CONSULTS:    Physical Therapy to evaluate and treat. Evaluate for home safety and equipment needs; Establish/upgrade home exercise program. Perform / instruct on therapeutic exercises, gait training, transfer training, and Range of Motion.  Occupational Therapy to evaluate and treat. Evaluate home environment for safety and equipment needs. Perform/Instruct on transfers, ADL training, ROM, and therapeutic exercises.    MISCELLANEOUS CARE:  Routine Skin for Bedridden Patients: Instruct patient/caregiver to apply moisture barrier cream to all skin folds and wet areas in perineal area daily and after baths and all bowel movements.    WOUND CARE ORDERS  n/a      Medications: Review discharge medications with patient and family and provide education.      Current Discharge Medication " "List      CONTINUE these medications which have NOT CHANGED    Details   acetaminophen (TYLENOL) 325 MG tablet Take 2 tablets (650 mg total) by mouth every 4 (four) hours as needed for Pain or Temperature greater than (100).  Refills: 0      ADVAIR DISKUS 100-50 mcg/dose diskus inhaler Take 1 puff by mouth Twice daily.      alendronate (FOSAMAX) 70 MG tablet Take 70 mg by mouth.      amlodipine (NORVASC) 10 MG tablet Take 1 tablet (10 mg total) by mouth once daily.  Qty: 30 tablet, Refills: 3      ANORO ELLIPTA 62.5-25 mcg/actuation DsDv       aspirin (ECOTRIN) 81 MG EC tablet Take 81 mg by mouth once daily.        atorvastatin (LIPITOR) 80 MG tablet TK 1 T PO D  Refills: 3      BD ULTRA-FINE ANISHA PEN NEEDLE 32 gauge x 5/32" Ndle use for insulin up to FOUR TIMES DAILY  Refills: 0      BIDIL 20-37.5 mg Tab TK 1 T PO TID  Refills: 1      calcium-vitamin D (CALCIUM-VITAMIN D) 500 mg(1,250mg) -200 unit per tablet Take 1 tablet by mouth 2 (two) times daily with meals.        cloNIDine (CATAPRES) 0.1 MG tablet Take 1 tablet (0.1 mg total) by mouth 2 (two) times daily.  Qty: 60 tablet, Refills: 3      clopidogrel (PLAVIX) 75 mg tablet TK 1 T PO D  Refills: 1      ergocalciferol (ERGOCALCIFEROL) 50,000 unit Cap Take 50,000 Units by mouth every 7 days.        famotidine (PEPCID) 40 MG tablet       furosemide (LASIX) 40 MG tablet Take 1 tablet (40 mg total) by mouth 2 (two) times daily.  Qty: 60 tablet, Refills: 11      gabapentin (NEURONTIN) 300 MG capsule Refills: 3      hydrocodone-acetaminophen 5-325mg (NORCO) 5-325 mg per tablet Take 1 tablet by mouth 3 (three) times daily as needed.       imatinib (GLEEVEC) 400 MG Tab Take 1 tablet (400 mg total) by mouth once daily.  Qty: 30 tablet, Refills: 2    Associated Diagnoses: BCR/ABL1-positive chronic myeloid leukemia (CML) in remission      insulin glargine (LANTUS) 100 unit/mL injection Inject 18 Units into the skin every evening.      isosorbide mononitrate (IMDUR) 30 MG 24 " hr tablet Take 1 tablet (30 mg total) by mouth once daily.  Qty: 30 tablet, Refills: 3      lisinopril (PRINIVIL,ZESTRIL) 40 MG tablet Take 1 tablet (40 mg total) by mouth once daily.  Qty: 30 tablet, Refills: 3      metoprolol tartrate (LOPRESSOR) 25 MG tablet TK 1 T PO BID  Refills: 1      nicotine (NICODERM CQ) 21 mg/24 hr Place 1 patch onto the skin once daily.  Qty: 28 patch, Refills: 11      nitroGLYCERIN (NITROSTAT) 0.4 MG SL tablet Place 1 tablet (0.4 mg total) under the tongue every 5 (five) minutes as needed for Chest pain.  Qty: 30 tablet, Refills: 0      prednisoLONE acetate (PRED FORTE) 1 % DrpS       ranitidine (ZANTAC) 300 MG tablet TK 1 T PO QHS  Refills: 1      ranolazine (RANEXA) 500 MG Tb12 Take 500 mg by mouth 2 (two) times daily.      tiotropium (SPIRIVA) 18 mcg inhalation capsule Inhale 18 mcg into the lungs once daily.        VENTOLIN HFA 90 mcg/actuation HFAA Inhale 2 puffs into the lungs every 4 to 6 hours as needed.         STOP taking these medications       CRESTOR 20 mg tablet Comments:   Reason for Stopping:               I certify that this patient is confined to her home and needs intermittent skilled nursing care, physical therapy and occupational therapy.

## 2019-06-15 NOTE — PLAN OF CARE
Problem: Physical Therapy Goal  Goal: Physical Therapy Goal  Goals to be met by: 19     Patient will increase functional independence with mobility by performin. Supine to sit with Modified [place]  2. Rolling to Left and Right with Modified [place]  3. Sit to stand transfer with Modified [place] usingRW  4. Bed to chair transfer with Modified [place] using Rolling Walker  5. Gait >250 feet with Modified [place] using Rolling Walker and O2   6. Upper/Lower extremity exercise program 3 sets x10 reps per handout, with independence    Pt ambulated ~120 ft with CGA using RW and 2L O2.  Pt with SOB during ambulation.

## 2019-06-15 NOTE — NURSING
"Pt reports left upper chest pain, went pt rm to assess and she describes pain as a "Funny feeling". VVS, and can not rate pain on scale of 0-10. I reposition pt to her right side, pt states she feels much better, and does not have the " Funny feeling no more".  MD Gold notified. EKG order received.   "

## 2019-06-15 NOTE — NURSING
Report received by Shanti HIDALGO. Pt after post cath procedure. Release started at 1800. No signs of bleeding of hematoma noted at site. Pulses +2 equally bilateral. Pt is AAO x4. Pt has not c/o chest pain or discomfort. Daughter at bedside. Will continue the release of vas band.

## 2019-06-15 NOTE — PROGRESS NOTES
TN faxed patient's clinicals (Facesheet, HP, MD notes, PT notes, POC, and MAR) to [facility] for home health auth at [phone].  Awaiting home health auth, patient prefers [facility].

## 2019-06-15 NOTE — PROGRESS NOTES
[facility]  Cardiology  Progress Note    Patient Name: Susan Howell  MRN: [id number]  Admission Date: 6/13/2019  Hospital Length of Stay: 1 days  Code Status: Full Code   Attending Physician: Faraz Harris MD   Primary Care Physician: Linda Mckee MD  Expected Discharge Date: 6/15/2019  Principal Problem:NSTEMI (non-ST elevated myocardial infarction)    Subjective:     Hospital Course:   Denies CP or SOB    Echo 6/14/19    · Moderately decreased left ventricular systolic function. The estimated ejection fraction is 35%  · Mild left ventricular enlargement.  · Eccentric left ventricular hypertrophy.  · Grade II (moderate) left ventricular diastolic dysfunction consistent with pseudonormalization.  · Normal right ventricular systolic function.  · Moderate left atrial enlargement.  · Mild mitral regurgitation.  · Mild tricuspid regurgitation.  · The estimated PA systolic pressure is 36 mm Hg      [facility] 6/15/19  LVEDP: 12mmHg  LVEF: 35%     Dominance: Right  LM: MLI  LAD: diffuse noncritical disease, mid stent patent  LCx: prox 80% (prox stent margin), mid stent patent with dist 90% stent margin, dist 50%  RCA: ost , collateralized L-R via septals     PCI LCx:  Preop ASA/Plavix, intraop heparin  Predil 3.0x20  Stent 3.5x38 Resolute SAM 18 tarah  Excellent result, T3 flow, 0% residual stenosis     Hemostasis:  R Radial band     Impression:  NSTEMI  3V CAD, mod LV dysfxn   RCA  Patent LAD stent  Patent mid LCx stent with severe stent margin stenosis  Successful prox-mid LCx 3.5x38 Resolute SAM  R rad vasband for hemostasis     Plan:  Observe  ASA 81mg qd   Plavix 75mg qd for 1 year (thru 6/2020)  BBl/ACEi/Statin  Home in am  Follow up with Dr. Turcios    Interval History:     Review of Systems   Constitution: Negative for decreased appetite.   HENT: Negative for ear discharge.    Eyes: Negative for blurred vision.   Respiratory: Negative for hemoptysis.    Endocrine: Negative for polyphagia.    Hematologic/Lymphatic: Negative for adenopathy.   Skin: Negative for color change.   Musculoskeletal: Negative for joint swelling.   Genitourinary: Negative for bladder incontinence.   Neurological: Negative for brief paralysis.   Psychiatric/Behavioral: Negative for hallucinations.   Allergic/Immunologic: Negative for hives.     Objective:     Vital Signs (Most Recent):  Temp: 97.9 °F (36.6 °C) (06/15/19 0722)  Pulse: 63 (06/15/19 0723)  Resp: 18 (06/15/19 0723)  BP: 135/63 (06/15/19 0722)  SpO2: 99 % (06/15/19 0723) Vital Signs (24h Range):  Temp:  [97.5 °F (36.4 °C)-98.1 °F (36.7 °C)] 97.9 °F (36.6 °C)  Pulse:  [58-83] 63  Resp:  [16-22] 18  SpO2:  [99 %-100 %] 99 %  BP: (100-135)/(50-76) 135/63     Weight: 60.8 kg (134 lb)  Body mass index is 23 kg/m².     SpO2: 99 %  O2 Device (Oxygen Therapy): nasal cannula      Intake/Output Summary (Last 24 hours) at 6/15/2019 1105  Last data filed at 6/15/2019 0825  Gross per 24 hour   Intake 340 ml   Output --   Net 340 ml       Lines/Drains/Airways     Peripheral Intravenous Line                 Peripheral IV - Single Lumen 06/13/19 20 G Left Forearm 2 days                Physical Exam   Constitutional: She is oriented to person, place, and time. She appears well-developed and well-nourished.   HENT:   Head: Normocephalic and atraumatic.   Eyes: Pupils are equal, round, and reactive to light. Conjunctivae are normal.   Neck: Normal range of motion. Neck supple.   Cardiovascular: Normal rate, normal heart sounds and intact distal pulses.   Pulmonary/Chest: Effort normal.   Abdominal: Soft. Bowel sounds are normal.   Musculoskeletal: Normal range of motion.   Neurological: She is alert and oriented to person, place, and time.   Skin: Skin is warm and dry.       Significant Labs: All pertinent lab results from the last 24 hours have been reviewed.    Significant Imaging: Echocardiogram:   2D echo with color flow doppler:   Results for orders placed or performed during the  hospital encounter of 03/03/16   2D echo with color flow doppler   Result Value Ref Range    QEF 40 (A) 55 - 65    Mitral Valve Regurgitation TRIVIAL     Diastolic Dysfunction Yes (A)     Est. PA Systolic Pressure 30.47     Pericardial Effusion NONE     Mitral Valve Mobility NORMAL     Tricuspid Valve Regurgitation TRIVIAL      Assessment and Plan:     Brief HPI:     * NSTEMI (non-ST elevated myocardial infarction)  Known ischemic CM - EF 35%. Occluded RCA and Hx Cx stent 2014. Troponin increased to 20. S/P SAM to Cx. Ok for d/c. Stressed the importance of compliance with plavix/ASA    Combined Systolic diastolic congestive heart failure  Diuresis and afterload reduction as tolerated. Repeat echo    Hyperlipidemia  On statin    Type 2 diabetes mellitus, uncontrolled  Per primary    CAD s/p PCI  As above    Tobacco abuse  counseled    COPD (chronic obstructive pulmonary disease)  Per primary    Hypertension  Stable on current meds        VTE Risk Mitigation (From admission, onward)        Ordered     IP VTE HIGH RISK PATIENT  Once      06/13/19 1554     Place sequential compression device  Until discontinued      06/13/19 1554        Ok for d/c  OV with me 1 week    Job Turcios MD  Cardiology  [facility]

## 2019-06-15 NOTE — DISCHARGE SUMMARY
[facility] Hospital Medicine  Discharge Summary      Patient Name: Susan Howell  MRN: [id number]  Admission Date: 6/13/2019  Hospital Length of Stay: 1 days  Discharge Date and Time:  06/15/2019 11:01 AM  Attending Physician: Faraz Harris MD   Discharging Provider: Faraz Harris MD  Primary Care Provider: Linda Mckee MD      HPI:   Susan Howell 74 y.o. female with COPD, CAD, HTN, HLD, DM2, and tobacco abuse presents to the hospital with a chief complaint of SOB. She reports yesterday she became SOB, but today the symptoms worsened today while smoking a cigarette. She felt very anxious and she then developed left sided chest tightness that was without radiation and resolved with treatment by EMS. She states they gave her a breathing treatment. She also self treated the chest pain with nitro/aspirin at home. She states this pain does not feel like a previous MI. She states her chest pain is completely resolved. Her breathing has improved with duo-nebs and steroids improved. She endorses a non-productive cough that is not worsened compared to her baseline. She denies fever, N/V, abdominal pain, syncope, dizziness, orthopnea, leg swelling, and dysuria.     In the ED, BNP 1300, troponin 0.05 at baseline, chest x-ray without coarse interstitial markings, and EKG of NSR with LVH.    Procedure(s) (LRB):  Left heart cath (Left)      Hospital Course:   Susan Howell 74 y.o. female with history for COPD on 2 L home oxygen, CAD sp MI/stent,  Combined systolic diastolic heart failure, HTN, HLD, DMT2 and current smoker is admitted for chest pain found to have NSTEMI and COPD exacerbation.  In the ED, EKG NSR  BNP 1300. Troponin 0.05 then up to 23 ,cardiogy was consulted,had LHC with PCI on Lcx,was on ACS medications,  Her symptoms much improved,was chest pain free at DC time,patient has been discharged home with ACS medication and follow up with PCP and out patient cardiology in next few  days.     Consults:   Consults (From admission, onward)        Status Ordering Provider     Inpatient consult to Cardiology  Once     Provider:  Fermin Nascimento MD    Acknowledged FERMIN NASCIMENTO     Inpatient consult to Social Work  Once     Provider:  (Not yet assigned)    CHRIS Mead     IP consult to case management  Once     Provider:  (Not yet assigned)    Acknowledged FERMIN NASCIMENTO          No new Assessment & Plan notes have been filed under this hospital service since the last note was generated.  Service: Hospital Medicine    Final Active Diagnoses:    Diagnosis Date Noted POA    PRINCIPAL PROBLEM:  NSTEMI (non-ST elevated myocardial infarction) [I21.4]  Yes    Combined Systolic diastolic congestive heart failure [I50.20] 06/13/2019 Unknown    CML (chronic myelocytic leukemia) [C92.10] 03/28/2018 Yes    Anemia of chronic disease [D63.8] 11/04/2014 Yes     Chronic    Hyperlipidemia [E78.5] 10/27/2014 Yes     Chronic    Type 2 diabetes mellitus, uncontrolled [E11.65] 08/19/2014 Yes     Chronic    CAD s/p PCI [I25.10] 06/07/2014 Yes     Chronic    Tobacco abuse [Z72.0] 06/07/2014 Yes     Chronic    COPD (chronic obstructive pulmonary disease) [J44.9]  Yes     Chronic    Hypertension [I10]  Yes     Chronic      Problems Resolved During this Admission:       Discharged Condition: stable    Disposition: Home or Self Care    Follow Up:  Follow-up Information     Linda Mckee MD In 1 week.    Specialty:  General Practice  Why:  Please call to schedule your PCP appt in 1 week   Contact information:  [address] [place], [place] [address]VD  [address]  [place] [address]  [phone]             Job Turcios MD On 6/26/2019.    Specialty:  Cardiology  Why:  Cardiology appt on Wednesday @ 1:00pm, outpatient services  Contact information:  [address]  [address]  [place] [address]  [phone]             Linda Mckee MD.    Specialty:  General Practice  Contact  information:  [address] [place] [address] [address]  [address]  [place] [address]  [phone]             [facility].    Specialties:  DME Provider, Home Health Services  Why:  Nurse will call you to arrange your home visits  Contact information:  [address]  [address]  [place] [address]  [phone]                 Patient Instructions:      Activity as tolerated       Significant Diagnostic Studies: Labs:   BMP:   Recent Labs   Lab 06/14/19  0211 06/14/19  1122 06/15/19  0418   * 217* 306*    137 138   K 4.2 4.1 4.2    101 102   CO2 24 26 27   BUN 17 22 40*   CREATININE 0.9 0.8 1.1   CALCIUM 9.8 9.1 8.7   , CBC   Recent Labs   Lab 06/13/19  1400 06/14/19  1122 06/15/19  0418   WBC 8.13 8.68 9.35   HGB 10.7* 10.0* 9.6*   HCT 34.1* 31.9* 30.4*    245 216   , Lipid Panel   Lab Results   Component Value Date    CHOL 179 03/05/2016    HDL 44 03/05/2016    LDLCALC 120.4 03/05/2016    TRIG 73 03/05/2016    CHOLHDL 24.6 03/05/2016    and Troponin   Recent Labs   Lab 06/14/19  0745   TROPONINI 23.632*     Radiology: X-Ray: CXR: X-Ray Chest 1 View (CXR): No results found for this visit on 06/13/19. and X-Ray Chest PA and Lateral (CXR): No results found for this visit on 06/13/19.  Cardiac Graphics: Echocardiogram:   2D echo with color flow doppler:   Results for orders placed or performed during the hospital encounter of 03/03/16   2D echo with color flow doppler   Result Value Ref Range    QEF 40 (A) 55 - 65    Mitral Valve Regurgitation TRIVIAL     Diastolic Dysfunction Yes (A)     Est. PA Systolic Pressure 30.47     Pericardial Effusion NONE     Mitral Valve Mobility NORMAL     Tricuspid Valve Regurgitation TRIVIAL     and Transthoracic echo (TTE) complete (Cupid Only):   Results for orders placed or performed during the hospital encounter of 06/13/19   Transthoracic echo (TTE) 2D with Color Flow   Result Value Ref Range    BSA 1.66 m2    TDI SEPTAL 0.04     LV LATERAL E/E' RATIO  16.50     LV SEPTAL E/E' RATIO 24.75     LA WIDTH 4.95 cm    AORTIC VALVE CUSP SEPERATION 1.72 cm    TDI LATERAL 0.06     PV PEAK VELOCITY 0.80 cm/s    LVIDD 4.92 3.5 - 6.0 cm    IVS 1.34 (A) 0.6 - 1.1 cm    PW 1.47 (A) 0.6 - 1.1 cm    Ao root annulus 2.93 cm    LVIDS 4.03 (A) 2.1 - 4.0 cm    FS 18 28 - 44 %    LA volume 88.65 cm3    Sinus 2.73 cm    STJ 2.40 cm    LV mass 285.80 g    LA size 4.15 cm    RVDD 3.36 cm    TAPSE 1.85 cm    RV S' 14.05 m/s    Left Ventricle Relative Wall Thickness 0.60 cm    AV mean gradient 3.95 mmHg    AV valve area 2.07 cm2    AV Velocity Ratio 0.68     AV index (prosthetic) 0.62     E/A ratio 0.93     Mean e' 0.05     E wave decelartion time 202.48 msec    IVRT 0.10 msec    Pulm vein S/D ratio 1.40     LVOT diameter 2.06 cm    LVOT area 3.33 cm2    LVOT peak bryon 0.08216580247 m/s    LVOT peak VTI 17.38 cm    Ao peak bryon 1.29 m/s    Ao VTI 27.99 cm    LVOT stroke volume 57.90 cm3    AV peak gradient 6.66 mmHg    E/E' ratio 19.80     MV Peak E Bryon 0.99 m/s    TR Max Bryon 2.86 m/s    MV Peak A Bryon 1.06 m/s    PV Peak S Bryon 0.70 m/s    PV Peak D Bryon 0.50 m/s    LV Systolic Volume 71.09 mL    LV Systolic Volume Index 43.1 mL/m2    LV Diastolic Volume 113.76 mL    LV Diastolic Volume Index 68.95 mL/m2    LA Volume Index 53.7 mL/m2    LV Mass Index 173.2 g/m2    RA Major Axis 5.12 cm    Left Atrium Minor Axis 4.83 cm    Left Atrium Major Axis 5.35 cm    Triscuspid Valve Regurgitation Peak Gradient 32.72 mmHg    RA Width 3.63 cm    Right Atrial Pressure (from IVC) 3 mmHg    TV rest pulmonary artery pressure 36 mmHg     Angiography: [facility]     Pending Diagnostic Studies:     Procedure Component Value Units Date/Time    EKG 12-LEAD on arrival to floor [[id number]] Collected:  06/14/19 1402    Order Status:  Sent Lab Status:  In process Updated:  06/15/19 0835    Narrative:       Test Reason : R07.9    Vent. Rate : 078 BPM     Atrial Rate : 078 BPM     P-R Int : 154 ms          QRS Dur : 088 ms       QT Int : 448 ms       P-R-T Axes : 085 079 248 degrees     QTc Int : 510 ms    Normal sinus rhythm  Possible Left atrial enlargement  ST and T wave abnormality, consider inferolateral ischemia  Prolonged QT  Abnormal ECG  When compared with ECG of 14-JUN-2019 07:45,  Significant changes have occurred    Referred By: YAHAIRA   SELF           Confirmed By:     EKG 12-LEAD starting tomorrow [[id number]]     Order Status:  Sent Lab Status:  No result     EKG 12-lead [[id number]]     Order Status:  Sent Lab Status:  No result     EKG 12-lead [[id number]] Collected:  06/14/19 0745    Order Status:  Sent Lab Status:  In process Updated:  06/14/19 1133    Narrative:       Test Reason : R07.9,    Vent. Rate : 089 BPM     Atrial Rate : 089 BPM     P-R Int : 162 ms          QRS Dur : 080 ms      QT Int : 418 ms       P-R-T Axes : 087 095 081 degrees     QTc Int : 508 ms    Normal sinus rhythm  Biatrial enlargement  Rightward axis  LVH  Anterior infarct ,age undetermined  Prolonged QT  Abnormal ECG  When compared with ECG of 13-JUN-2019 12:10,  Significant changes have occurred    Referred By: YAHAIRA   SELF           Confirmed By:          Medications:  Reconciled Home Medications:      Medication List      CONTINUE taking these medications    acetaminophen 325 MG tablet  Commonly known as:  TYLENOL  Take 2 tablets (650 mg total) by mouth every 4 (four) hours as needed for Pain or Temperature greater than (100).     ADVAIR DISKUS 100-50 mcg/dose diskus inhaler  Generic drug:  fluticasone-salmeterol 100-50 mcg/dose  Take 1 puff by mouth Twice daily.     alendronate 70 MG tablet  Commonly known as:  FOSAMAX  Take 70 mg by mouth.     amLODIPine 10 MG tablet  Commonly known as:  NORVASC  Take 1 tablet (10 mg total) by mouth once daily.     ANORO ELLIPTA 62.5-25 mcg/actuation Dsdv  Generic drug:  umeclidinium-vilanterol     aspirin 81 MG EC tablet  Commonly known as:  ECOTRIN  Take 81 mg by mouth once daily.     atorvastatin 80 MG  "tablet  Commonly known as:  LIPITOR  TK 1 T PO D     BD ULTRA-FINE ANISHA PEN NEEDLE 32 gauge x 5/32" Ndle  Generic drug:  pen needle, diabetic  use for insulin up to FOUR TIMES DAILY     BIDIL 20-37.5 mg Tab  Generic drug:  isosorbide-hydrALAZINE 20-37.5 mg  TK 1 T PO TID     CALCIUM 500 + D 500 mg(1,250mg) -200 unit per tablet  Generic drug:  calcium-vitamin D3  Take 1 tablet by mouth 2 (two) times daily with meals.     cloNIDine 0.1 MG tablet  Commonly known as:  CATAPRES  Take 1 tablet (0.1 mg total) by mouth 2 (two) times daily.     clopidogrel 75 mg tablet  Commonly known as:  PLAVIX  TK 1 T PO D     ergocalciferol 50,000 unit Cap  Commonly known as:  ERGOCALCIFEROL  Take 50,000 Units by mouth every 7 days.     famotidine 40 MG tablet  Commonly known as:  PEPCID     furosemide 40 MG tablet  Commonly known as:  LASIX  Take 1 tablet (40 mg total) by mouth 2 (two) times daily.     gabapentin 300 MG capsule  Commonly known as:  NEURONTIN     HYDROcodone-acetaminophen 5-325 mg per tablet  Commonly known as:  NORCO  Take 1 tablet by mouth 3 (three) times daily as needed.     imatinib 400 MG Tab  Commonly known as:  GLEEVEC  Take 1 tablet (400 mg total) by mouth once daily.     insulin glargine 100 unit/mL injection  Commonly known as:  LANTUS  Inject 18 Units into the skin every evening.     isosorbide mononitrate 30 MG 24 hr tablet  Commonly known as:  IMDUR  Take 1 tablet (30 mg total) by mouth once daily.     lisinopril 40 MG tablet  Commonly known as:  PRINIVIL,ZESTRIL  Take 1 tablet (40 mg total) by mouth once daily.     metoprolol tartrate 25 MG tablet  Commonly known as:  LOPRESSOR  TK 1 T PO BID     nicotine 21 mg/24 hr  Commonly known as:  NICODERM CQ  Place 1 patch onto the skin once daily.     nitroGLYCERIN 0.4 MG SL tablet  Commonly known as:  NITROSTAT  Place 1 tablet (0.4 mg total) under the tongue every 5 (five) minutes as needed for Chest pain.     prednisoLONE acetate 1 % Drps  Commonly known as:  " PRED FORTE     ranitidine 300 MG tablet  Commonly known as:  ZANTAC  TK 1 T PO QHS     ranolazine 500 MG Tb12  Commonly known as:  RANEXA  Take 500 mg by mouth 2 (two) times daily.     tiotropium 18 mcg inhalation capsule  Commonly known as:  SPIRIVA  Inhale 18 mcg into the lungs once daily.     VENTOLIN HFA 90 mcg/actuation inhaler  Generic drug:  albuterol  Inhale 2 puffs into the lungs every 4 to 6 hours as needed.        STOP taking these medications    CRESTOR 20 MG tablet  Generic drug:  rosuvastatin            Indwelling Lines/Drains at time of discharge:   Lines/Drains/Airways          None          Time spent on the discharge of patient: over 30  minutes  Patient was seen and examined on the date of discharge and determined to be suitable for discharge.         Faraz Harris MD  Department of Hospital Medicine  [facility]

## 2019-06-15 NOTE — NURSING
Discharge instructions given to patient and daughter, both verbalized understanding. Patient left with transport by wheelchair to the family vehicle. No distress noted.

## 2019-06-15 NOTE — PLAN OF CARE
Problem: Fall Injury Risk  Goal: Absence of Fall and Fall-Related Injury    Intervention: Identify and Manage Contributors to Fall Injury Risk     06/15/19 1319   Manage Acute Allergic Reaction   Medication Review/Management medications reviewed   Identify and Manage Contributors to Fall Injury Risk   Self-Care Promotion BADL personal objects within reach;BADL personal routines maintained     Intervention: Promote Injury-Free Environment     06/15/19 1319   Optimize [place] and Functional Mobility   Environmental Safety Modification assistive device/personal items within reach;clutter free environment maintained;room near unit station;room organization consistent   Optimize Balance and Safe Activity   Safety Promotion/Fall Prevention assistive device/personal item within reach;bed alarm set;bedside commode chair;Fall Risk reviewed with patient/family;Fall Risk signage in place;medications reviewed;nonskid shoes/socks when out of bed;room near unit station;side rails raised x 2;instructed to call staff for mobility         Problem: Diabetes Comorbidity  Goal: Blood Glucose Level Within Desired Range    Intervention: Maintain Glycemic Control     06/15/19 1319   Monitor and Manage Ketoacidosis   Glycemic Management blood glucose monitoring;supplemental insulin given

## 2019-06-15 NOTE — PLAN OF CARE
"TN reviewed follow up appointment information as well as  " Heart Attack discharge instructions" handout with patient using teach back. Patient stated she will notify the doctor if she has chest pain and SOB.  Patient is in agreement and verbalized an understanding. Placed discharge information in blue discharge folder. TN also reviewed patient responsibility checklist with her using teach back. Patient was able to verbalize her responsibilities after discharge to manage her care at home being   1. Going to follow up appointments   2. Picking up rx from the pharmacy when discharged  3. Taking her medications as prescribed     Patient informed that [facility] will contact her to arrange her home visits once approved by N.     Patient's nurse,Luisa, informed that patient can discharge from  standpoint.     Patient's daughter will arrive at 3:30 pm to pick patient up.       06/15/19 1112   Final Note   Assessment Type Final Discharge Note   Anticipated Discharge Disposition Home-Health   What phone number can be called within the next 1-3 days to see how you are doing after discharge?   ([phone])   Hospital Follow Up  Appt(s) scheduled? Yes   Discharge plans and expectations educations in teach back method with documentation complete? Yes   Right Care Referral Info   Post Acute Recommendation Home-care     "

## 2019-06-15 NOTE — CONSULTS
Cardiology appt scheduled.    Follow-up Information                Job Turcios MD On 6/26/2019.    Specialty:  Cardiology  Why:  Cardiology appt on Wednesday @ 1:00pm, outpatient services  Contact information:  [address]  [address]  [facility] [id number] [phone]

## 2019-06-15 NOTE — PT/OT/SLP EVAL
Physical Therapy Evaluation    Patient Name:  Susan Howell   MRN:  [id number]    Recommendations:     Discharge Recommendations:  home health PT   Discharge Equipment Recommendations: none   Barriers to discharge: None    Assessment:     Susan Howell is a 74 y.o. female admitted with a medical diagnosis of NSTEMI (non-ST elevated myocardial infarction).  She presents with the following impairments/functional limitations:  weakness, impaired endurance, impaired functional mobilty, gait instability, impaired balance, decreased safety awareness, impaired cardiopulmonary response to activity.    Rehab Prognosis: Fair+; patient would benefit from acute skilled PT services to address these deficits and reach maximum level of function.    Recent Surgery: Procedure(s) (LRB):  Left heart cath (Left) 1 Day Post-Op    Plan:     During this hospitalization, patient to be seen 3 x/week(M-F) to address the identified rehab impairments via gait training, therapeutic activities, therapeutic exercises and progress toward the following goals:    · Plan of Care Expires:  06/29/19    Subjective     Chief Complaint: N/A  Patient/Family Comments/goals: Pt reported going home today.   Pain/Comfort:  · Pain Rating 1: 0/10    Living Environment:  Pt lives with daughter in a SS house with ~17 steps to enter, however there is an elevator.  Prior to admission, patients level of function was mod I with ambulation using RW/rollator and home O2 @ 2L PRN.  Equipment used at home: rollator, walker, rolling, shower chair, oxygen, cane, quad.  Upon discharge, patient will have assistance from family.    Objective:     Patient found HOB elevated with oxygen, peripheral IV, telemetry upon PT entry to room.    General Precautions: Standard, fall, diabetic, respiratory, hearing impaired, seizures   Orthopedic Precautions:N/A   Braces: N/A     Exams:  · Cognitive Exam:  Patient was able to follow 2 step commands 2* [place].  · Gross Motor Coordination:   WFL  · Postural Exam:  Patient presented with the following abnormalities:    · -       Rounded shoulders  · Sensation:    · -       Intact  light/touch UE/LE  · Skin Integrity/Edema:      · -       Skin integrity: Visible skin intact  · -       Edema: None noted UE/LE  · BUE ROM: WFL  · BUE Strength: WFL  · BLE ROM: WFL  · BLE Strength: WFL    Functional Mobility:  · Bed Mobility:     · Scooting: stand by assistance  · Supine to Sit: stand by assistance with HOB elevated   · Transfers:     · Sit to Stand:  stand by assistance and contact guard assistance with rolling walker  · Bed to Chair: stand by assistance and contact guard assistance with  rolling walker  using  Step Transfer  · Gait: Pt ambulated ~120 ft with CGA-SBA using RW and 2L O2 NC.  Pt visibly SOB with decreased step length and kathi.    · Balance: Pt with fair+ balance.       Therapeutic Activities and Exercises:  BUE seated therex 10 reps: shoulder flex, butterfly, arm circles, and shoulder circles    BLE seated therex 10 reps: hip flex, hip abd/add, LAQ, and AP    Pt received written HEP for UE/LE therex.  Pt encouraged to perform therex throughout the day.  Pt verbalized good understanding, HEP left in pt's blue communication folder.      AM-PAC 6 CLICK MOBILITY  Total Score:21     Patient left up in chair with all lines intact, call button in reach and nurse Luisa notified.    GOALS:   Multidisciplinary Problems     Physical Therapy Goals        Problem: Physical Therapy Goal    Goal Priority Disciplines Outcome Goal Variances Interventions   Physical Therapy Goal     PT, PT/OT      Description:  Goals to be met by: 19     Patient will increase functional independence with mobility by performin. Supine to sit with Modified [place]  2. Rolling to Left and Right with Modified [place]  3. Sit to stand transfer with Modified [place] usingRW  4. Bed to chair transfer with Modified [place] using Rolling Walker  5. Gait  >250 feet with Modified [place] using Rolling Walker and O2   6. Upper/Lower extremity exercise program 3 sets x10 reps per handout, with independence                      History:     Past Medical History:   Diagnosis Date    Asthma     Cancer 10/10/2014    Bone cancer     CHF (congestive heart failure)     Cigarette smoker     COPD (chronic obstructive pulmonary disease)     Diabetes mellitus     Diabetes mellitus type II     Emphysema of lung     [place] (hard of hearing)     Hypercholesteremia     Hypertension     Myocardial infarct 2013    On home oxygen therapy     Palpitations     Pneumonia 2019    PVD (peripheral vascular disease)     Seizures     Stroke     Unsteady gait     uses a cane & walker       Past Surgical History:   Procedure Laterality Date    APPENDECTOMY       SECTION      CHOLECYSTECTOMY      HYSTERECTOMY      VASCULAR SURGERY      stent in L leg; unable to place in R leg d/t blockage       Time Tracking:     PT Received On: 06/15/19  PT Start Time: 1002     PT Stop Time: 1025  PT Total Time (min): 23 min     Billable Minutes: Evaluation 13 min and Therapeutic Exercise 10 min      Ann Marie Coleman, PT  06/15/2019

## 2019-06-15 NOTE — PLAN OF CARE
Problem: Adult Inpatient Plan of Care  Goal: Plan of Care Review  Outcome: Ongoing (interventions implemented as appropriate)  Pt resting well on 2L Nc, breathing tx given without problems.  Pt refuses bipap at this time

## 2019-06-15 NOTE — PROGRESS NOTES
[facility] CASE MANAGEMENT                  WRITTEN DISCHARGE INFORMATION      APPOINTMENTS AND RESOURCES TO HELP YOU MANAGE YOUR CARE AT HOME BASED ON YOUR PREFERENCES:  (If an appointment is not scheduled for you when you leave the hospital, call your doctor to schedule a follow up visit within a week)    Follow-up Information     Linda Mckee MD In 1 week.    Specialty:  General Practice  Why:  Please call to schedule your PCP appt in 1 week   Contact information:  [address] [place] [address]  [address]  [place] [address]  [phone]             Job Turcios MD On 6/26/2019.    Specialty:  Cardiology  Why:  Cardiology appt on Wednesday @ 1:00pm, outpatient services  Contact information:  [address]  [address]  [place] [address]  [phone]                   Healthy Living Instructions to HELP MANAGE YOUR CARE AT HOME:  Things You are responsible for:  1.    Getting your prescriptions filled   2.    Taking your medications as directed, DO NOT MISS ANY DOSES!  3.    Following the diet and exercise recommended by your doctor  4.    Going to your follow-up doctor appointment. This is important because it allows the doctor to monitor your progress and determine if any changes need to made to your treatment plan.  5. If you have any questions about MANAGING YOUR CARE AT HOME Call the Nurse Care Line for 24/7 Assistance [phone]       Please answer any calls you may receive from [facility]. We want to continue to support you as you manage your healthcare needs. [facility] is happy to have the opportunity to serve you.      Thank you for choosing [facility] for your healthcare needs!  Your [facility] Case Management Team,

## 2019-06-15 NOTE — PLAN OF CARE
Problem: Fall Injury Risk  Goal: Absence of Fall and Fall-Related Injury  Outcome: Ongoing (interventions implemented as appropriate)  Intervention: Identify and Manage Contributors to Fall Injury Risk     06/15/19 0346   Manage Acute Allergic Reaction   Medication Review/Management medications reviewed   Identify and Manage Contributors to Fall Injury Risk   Self-Care Promotion BADL personal routines maintained;BADL personal objects within reach;independence encouraged     Intervention: Promote Injury-Free Environment     06/15/19 0344 06/15/19 0346   Optimize [place] and Functional Mobility   Environmental Safety Modification  --  assistive device/personal items within reach;clutter free environment maintained;lighting adjusted;room near unit station   Optimize Balance and Safe Activity   Safety Promotion/Fall Prevention assistive device/personal item within reach;bed alarm set;Fall Risk signage in place;Fall Risk reviewed with patient/family;family to remain at bedside;medications reviewed;side rails raised x 3;room near unit station;nonskid shoes/socks when out of bed;instructed to call staff for mobility  --          Problem: Diabetes Comorbidity  Goal: Blood Glucose Level Within Desired Range  Outcome: Ongoing (interventions implemented as appropriate)  Intervention: Maintain Glycemic Control     06/15/19 0346   Monitor and Manage Ketoacidosis   Glycemic Management blood glucose monitoring;supplemental insulin given

## 2019-06-15 NOTE — SUBJECTIVE & OBJECTIVE
Interval History:     Review of Systems   Constitution: Negative for decreased appetite.   HENT: Negative for ear discharge.    Eyes: Negative for blurred vision.   Respiratory: Negative for hemoptysis.    Endocrine: Negative for polyphagia.   Hematologic/Lymphatic: Negative for adenopathy.   Skin: Negative for color change.   Musculoskeletal: Negative for joint swelling.   Genitourinary: Negative for bladder incontinence.   Neurological: Negative for brief paralysis.   Psychiatric/Behavioral: Negative for hallucinations.   Allergic/Immunologic: Negative for hives.     Objective:     Vital Signs (Most Recent):  Temp: 97.9 °F (36.6 °C) (06/15/19 0722)  Pulse: 63 (06/15/19 0723)  Resp: 18 (06/15/19 0723)  BP: 135/63 (06/15/19 0722)  SpO2: 99 % (06/15/19 0723) Vital Signs (24h Range):  Temp:  [97.5 °F (36.4 °C)-98.1 °F (36.7 °C)] 97.9 °F (36.6 °C)  Pulse:  [58-83] 63  Resp:  [16-22] 18  SpO2:  [99 %-100 %] 99 %  BP: (100-135)/(50-76) 135/63     Weight: 60.8 kg (134 lb)  Body mass index is 23 kg/m².     SpO2: 99 %  O2 Device (Oxygen Therapy): nasal cannula      Intake/Output Summary (Last 24 hours) at 6/15/2019 1105  Last data filed at 6/15/2019 0825  Gross per 24 hour   Intake 340 ml   Output --   Net 340 ml       Lines/Drains/Airways     Peripheral Intravenous Line                 Peripheral IV - Single Lumen 06/13/19 20 G Left Forearm 2 days                Physical Exam   Constitutional: She is oriented to person, place, and time. She appears well-developed and well-nourished.   HENT:   Head: Normocephalic and atraumatic.   Eyes: Pupils are equal, round, and reactive to light. Conjunctivae are normal.   Neck: Normal range of motion. Neck supple.   Cardiovascular: Normal rate, normal heart sounds and intact distal pulses.   Pulmonary/Chest: Effort normal.   Abdominal: Soft. Bowel sounds are normal.   Musculoskeletal: Normal range of motion.   Neurological: She is alert and oriented to person, place, and time.   Skin:  Skin is warm and dry.       Significant Labs: All pertinent lab results from the last 24 hours have been reviewed.    Significant Imaging: Echocardiogram:   2D echo with color flow doppler:   Results for orders placed or performed during the hospital encounter of 03/03/16   2D echo with color flow doppler   Result Value Ref Range    QEF 40 (A) 55 - 65    Mitral Valve Regurgitation TRIVIAL     Diastolic Dysfunction Yes (A)     Est. PA Systolic Pressure 30.47     Pericardial Effusion NONE     Mitral Valve Mobility NORMAL     Tricuspid Valve Regurgitation TRIVIAL

## 2019-06-15 NOTE — HOSPITAL COURSE
Denies CP or SOB    Echo 6/14/19    · Moderately decreased left ventricular systolic function. The estimated ejection fraction is 35%  · Mild left ventricular enlargement.  · Eccentric left ventricular hypertrophy.  · Grade II (moderate) left ventricular diastolic dysfunction consistent with pseudonormalization.  · Normal right ventricular systolic function.  · Moderate left atrial enlargement.  · Mild mitral regurgitation.  · Mild tricuspid regurgitation.  · The estimated PA systolic pressure is 36 mm Hg      C 6/15/19  LVEDP: 12mmHg  LVEF: 35%     Dominance: Right  LM: MLI  LAD: diffuse noncritical disease, mid stent patent  LCx: prox 80% (prox stent margin), mid stent patent with dist 90% stent margin, dist 50%  RCA: ost , collateralized L-R via septals     PCI LCx:  Preop ASA/Plavix, intraop heparin  Predil 3.0x20  Stent 3.5x38 Resolute SAM 18 tarah  Excellent result, T3 flow, 0% residual stenosis     Hemostasis:  R Radial band     Impression:  NSTEMI  3V CAD, mod LV dysfxn   RCA  Patent LAD stent  Patent mid LCx stent with severe stent margin stenosis  Successful prox-mid LCx 3.5x38 Resolute SAM  R rad vasband for hemostasis     Plan:  Observe  ASA 81mg qd   Plavix 75mg qd for 1 year (thru 6/2020)  BBl/ACEi/Statin  Home in am  Follow up with Dr. Turcios

## 2019-06-15 NOTE — NURSING
Vas band released finished. +2 radial pulses bilateral, no signs of bleeding or hematoma noted. No c/o chest pain or any other discomfort. Pt's daugther remain at bed side. Pt asked to please let her rest. Pt states she has had a very long day.

## 2019-06-26 NOTE — PROGRESS NOTES
Subjective:    Patient ID:  Susan Howell is a 74 y.o. female who presents for follow-up of Hospital Follow Up      HPI   CAD - Cx stent 2014, remote stent LAD, Occluded RCA, NSTEMI with SAM Cx 6/15/19, EF 35%, HTN, HLD, DM    Admitted 6/13/19  Susan Howell 74 y.o. female with COPD, CAD, HTN, HLD, DM2, and tobacco abuse presents to the hospital with a chief complaint of SOB. She reports yesterday she became SOB, but today the symptoms worsened today while smoking a cigarette. She felt very anxious and she then developed left sided chest tightness that was without radiation and resolved with treatment by EMS. She states they gave her a breathing treatment. She also self treated the chest pain with nitro/aspirin at home. She states this pain does not feel like a previous MI. She states her chest pain is completely resolved. Her breathing has improved with duo-nebs and steroids improved. She endorses a non-productive cough that is not worsened compared to her baseline. She denies fever, N/V, abdominal pain, syncope, dizziness, orthopnea, leg swelling, and dysuria.      In the ED, BNP 1300, troponin 0.05 at baseline, chest x-ray without coarse interstitial markings, and EKG of NSR with LVH.    Susan Howell 74 y.o. female with history for COPD on 2 L home oxygen, CAD sp MI/stent,  Combined systolic diastolic heart failure, HTN, HLD, DMT2 and current smoker is admitted for chest pain found to have NSTEMI and COPD exacerbation.  In the ED, EKG NSR  BNP 1300. Troponin 0.05 then up to 23 ,cardiogy was consulted,had LHC with PCI on Lcx,was on ACS medications,  Her symptoms much improved,was chest pain free at DC time,patient has been discharged home with ACS medication and follow up with PCP and out patient cardiology in next few days.     Echo 6/14/19     · Moderately decreased left ventricular systolic function. The estimated ejection fraction is 35%  · Mild left ventricular enlargement.  · Eccentric left ventricular  hypertrophy.  · Grade II (moderate) left ventricular diastolic dysfunction consistent with pseudonormalization.  · Normal right ventricular systolic function.  · Moderate left atrial enlargement.  · Mild mitral regurgitation.  · Mild tricuspid regurgitation.  · The estimated PA systolic pressure is 36 mm Hg        [facility] 6/15/19  LVEDP: 12mmHg  LVEF: 35%     Dominance: Right  LM: MLI  LAD: diffuse noncritical disease, mid stent patent  LCx: prox 80% (prox stent margin), mid stent patent with dist 90% stent margin, dist 50%  RCA: ost , collateralized L-R via septals     PCI LCx:  Preop ASA/Plavix, intraop heparin  Predil 3.0x20  Stent 3.5x38 Resolute SAM 18 tarah  Excellent result, T3 flow, 0% residual stenosis     Hemostasis:  R Radial band     Impression:  NSTEMI  3V CAD, mod LV dysfxn   RCA  Patent LAD stent  Patent mid LCx stent with severe stent margin stenosis  Successful prox-mid LCx 3.5x38 Resolute SAM  R rad vasband for hemostasis     Feels better since discharge  Denies CP, less SOB  BP elevated controlled by home readings      Review of Systems   Constitution: Negative for decreased appetite.   HENT: Negative for ear discharge.    Eyes: Negative for blurred vision.   Respiratory: Negative for hemoptysis.    Endocrine: Negative for polyphagia.   Hematologic/Lymphatic: Negative for adenopathy.   Skin: Negative for color change.   Musculoskeletal: Negative for joint swelling.   Genitourinary: Negative for bladder incontinence.   Neurological: Negative for brief paralysis.   Psychiatric/Behavioral: Negative for hallucinations.   Allergic/Immunologic: Negative for hives.        Objective:    Physical Exam   Constitutional: She is oriented to person, place, and time. She appears well-developed and well-nourished.   HENT:   Head: Normocephalic and atraumatic.   Eyes: Pupils are equal, round, and reactive to light. Conjunctivae are normal.   Neck: Normal range of motion. Neck supple.   Cardiovascular: Normal  rate, normal heart sounds and intact distal pulses.   Pulmonary/Chest: Effort normal and breath sounds normal.   Abdominal: Soft. Bowel sounds are normal.   Musculoskeletal: Normal range of motion.   Neurological: She is alert and oriented to person, place, and time.   Skin: Skin is warm and dry.         Assessment:       1. History of CVA (cerebrovascular accident)    2. TIA (transient ischemic attack)    3. Panlobular emphysema    4. Coronary artery disease involving native coronary artery of native heart without angina pectoris    5. Essential hypertension    6. NSTEMI (non-ST elevated myocardial infarction)         Plan:       Doing well after NSTEMI  OV 3 months with repeat echo - will discuss AICD if EF < 35%

## 2019-06-29 PROBLEM — J96.02 ACUTE RESPIRATORY FAILURE WITH HYPOXIA AND HYPERCAPNIA: Status: ACTIVE | Noted: 2019-01-01

## 2019-06-29 PROBLEM — G93.40 ENCEPHALOPATHY: Status: ACTIVE | Noted: 2019-01-01

## 2019-06-29 PROBLEM — J96.01 ACUTE RESPIRATORY FAILURE WITH HYPOXIA AND HYPERCAPNIA: Status: ACTIVE | Noted: 2019-01-01

## 2019-06-29 PROBLEM — J96.90 RESPIRATORY FAILURE: Status: ACTIVE | Noted: 2019-01-01

## 2019-06-29 NOTE — ASSESSMENT & PLAN NOTE
There was mentioning of wheezing per ED's note.  Patient is clear per my evaluation.  Will wean prednisone.

## 2019-06-29 NOTE — ASSESSMENT & PLAN NOTE
Hold all oral and home meds  Start levemir 5 units daily  SSI and accucheck  Titrate insulin as needed

## 2019-06-29 NOTE — ED NOTES
Pt becoming more anxious and tachypneic, sitting on side of bed. MD aware and to order bipap. MD also requesting pt to be moved to a room closer to the nurse's station to be monitored.

## 2019-06-29 NOTE — PLAN OF CARE
Problem: Adult Inpatient Plan of Care  Goal: Patient-Specific Goal (Individualization)  Outcome: Ongoing (interventions implemented as appropriate)  Pt remains on mechanical ventilation with the following settings: PRVC rt 16, Vt 450, + 5, 30%. ETT 7.5/ 21 @ lip. AMBU bag and mask at [facility]. Continue mechanical ventilation and titration along with duoneb tx per orders. Will continue to monitor. JORGE Zapata, RRT

## 2019-06-29 NOTE — ED NOTES
Called both of patient's emergency contact numbers to update on pt situation and admittance to ICU. No answer for either number.

## 2019-06-29 NOTE — ED TRIAGE NOTES
Pt presents to ED via EMS with c/o chest pain that woke her out of sleep about an hour PTA. Pt took two nitro at home and was given two nitro from EMS, 325 aspirin, morphine and an inch of nitro paste from EMS as well. Pt did not obtain relief from any of the above. However, pt states this does not feel like her previous cardiac pain but instead feels like her reflux. It is an epigastric pain, denies radiation, 10/10 on the pain scale. She states before bed her blood sugar was low so she had orange juice and pizza to bring her sugar back up. At this time, she keeps stating she needs to throw it up because the food is stuck in her chest, causing the pain.

## 2019-06-29 NOTE — SUBJECTIVE & OBJECTIVE
"Past Medical History:   Diagnosis Date    Asthma     Cancer 10/10/2014    Bone cancer     CHF (congestive heart failure)     Cigarette smoker     COPD (chronic obstructive pulmonary disease)     Diabetes mellitus     Diabetes mellitus type II     Emphysema of lung     [place]hard of hearing)     Hypercholesteremia     Hypertension     Myocardial infarct 2013    On home oxygen therapy     Palpitations     Pneumonia 2019    PVD (peripheral vascular disease)     Seizures     Stroke     Unsteady gait     uses a cane & walker       Past Surgical History:   Procedure Laterality Date    APPENDECTOMY       SECTION      CHOLECYSTECTOMY      HYSTERECTOMY      Left heart cath Left 2019    Performed by Fermin Healy MD at [facility] CATH LAB    VASCULAR SURGERY      stent in L leg; unable to place in R leg d/t blockage       Review of patient's allergies indicates:   Allergen Reactions    Morphine      Pt states, "I'm not allergic to morphine they gave me too much at [facility]. Instead of giving me 2mg she gave me 5mg and I was almost dead."     Codeine      Blisters vs sweling (pt unsure which one)    Iodinated contrast- oral and iv dye     Iodine containing multivitamin      Blisters vs swelling (pt unsure which one.)    Lidocaine        No current facility-administered medications on file prior to encounter.      Current Outpatient Medications on File Prior to Encounter   Medication Sig    acetaminophen (TYLENOL) 325 MG tablet Take 2 tablets (650 mg total) by mouth every 4 (four) hours as needed for Pain or Temperature greater than (100).    ADVAIR DISKUS 100-50 mcg/dose diskus inhaler Take 1 puff by mouth Twice daily.    alendronate (FOSAMAX) 70 MG tablet Take 70 mg by mouth.    amlodipine (NORVASC) 10 MG tablet Take 1 tablet (10 mg total) by mouth once daily.    ANORO ELLIPTA 62.5-25 mcg/actuation DsDv     aspirin (ECOTRIN) 81 MG EC tablet Take 81 mg by mouth once " "daily.      atorvastatin (LIPITOR) 80 MG tablet TK 1 T PO D    BD ULTRA-FINE ANISHA PEN NEEDLE 32 gauge x 5/32" Ndle use for insulin up to FOUR TIMES DAILY    BIDIL 20-37.5 mg Tab TK 1 T PO TID    calcium-vitamin D (CALCIUM-VITAMIN D) 500 mg(1,250mg) -200 unit per tablet Take 1 tablet by mouth 2 (two) times daily with meals.      cloNIDine (CATAPRES) 0.1 MG tablet Take 1 tablet (0.1 mg total) by mouth 2 (two) times daily.    clopidogrel (PLAVIX) 75 mg tablet TK 1 T PO D    ergocalciferol (ERGOCALCIFEROL) 50,000 unit Cap Take 50,000 Units by mouth every 7 days.      famotidine (PEPCID) 40 MG tablet     furosemide (LASIX) 40 MG tablet Take 1 tablet (40 mg total) by mouth 2 (two) times daily.    gabapentin (NEURONTIN) 300 MG capsule     hydrocodone-acetaminophen 5-325mg (NORCO) 5-325 mg per tablet Take 1 tablet by mouth 3 (three) times daily as needed.     imatinib (GLEEVEC) 400 MG Tab Take 1 tablet (400 mg total) by mouth once daily.    insulin glargine (LANTUS) 100 unit/mL injection Inject 18 Units into the skin every evening.    isosorbide mononitrate (IMDUR) 30 MG 24 hr tablet Take 1 tablet (30 mg total) by mouth once daily.    lisinopril (PRINIVIL,ZESTRIL) 40 MG tablet Take 1 tablet (40 mg total) by mouth once daily.    metoprolol tartrate (LOPRESSOR) 25 MG tablet TK 1 T PO BID    nicotine (NICODERM CQ) 21 mg/24 hr Place 1 patch onto the skin once daily.    nitroGLYCERIN (NITROSTAT) 0.4 MG SL tablet Place 1 tablet (0.4 mg total) under the tongue every 5 (five) minutes as needed for Chest pain.    prednisoLONE acetate (PRED FORTE) 1 % DrpS     ranitidine (ZANTAC) 300 MG tablet TK 1 T PO QHS    ranolazine (RANEXA) 500 MG Tb12 Take 500 mg by mouth 2 (two) times daily.    tiotropium (SPIRIVA) 18 mcg inhalation capsule Inhale 18 mcg into the lungs once daily.      VENTOLIN HFA 90 mcg/actuation HFAA Inhale 2 puffs into the lungs every 4 to 6 hours as needed.     Family History     Problem Relation " (Age of Onset)    Asthma Mother    Breast cancer Sister    Cancer Father    Diabetes Mother        Tobacco Use    Smoking status: Current Every Day Smoker     Packs/day: 0.50     Years: 56.00     Pack years: 28.00     Types: Cigarettes    Smokeless tobacco: Current User   Substance and Sexual Activity    Alcohol use: No    Drug use: No    Sexual activity: Never     Review of Systems   Unable to perform ROS: Intubated     Objective:     Vital Signs (Most Recent):  Temp: 97.4 °F (36.3 °C) (06/29/19 0830)  Pulse: 96 (06/29/19 1132)  Resp: (!) 28 (06/29/19 1132)  BP: 134/74 (06/29/19 1132)  SpO2: 100 % (06/29/19 1132) Vital Signs (24h Range):  Temp:  [95.1 °F (35.1 °C)-98.9 °F (37.2 °C)] 97.4 °F (36.3 °C)  Pulse:  [] 96  Resp:  [0-28] 28  SpO2:  [90 %-100 %] 100 %  BP: (100-226)/() 134/74     Weight: 60.9 kg (134 lb 4.2 oz)  Body mass index is 23.05 kg/m².    Physical Exam   Constitutional: She appears well-developed and well-nourished.   HENT:   Head: Normocephalic and atraumatic.   Mouth/Throat: Oropharynx is clear and moist.   Eyes: Pupils are equal, round, and reactive to light. No scleral icterus.   Neck: Normal range of motion. Neck supple.   Cardiovascular: Normal rate, regular rhythm and normal heart sounds.   Pulmonary/Chest: Effort normal and breath sounds normal. She has no wheezes. She has no rales.   Intubated   Abdominal: Soft. Bowel sounds are normal. She exhibits no distension and no mass. There is no guarding.   Genitourinary:   Genitourinary Comments: Boggs   Musculoskeletal: She exhibits no edema, tenderness or deformity.   Neurological:   Intubated and sedated with RASS -2   Skin: Skin is warm and dry. Capillary refill takes less than 2 seconds. No rash noted. No erythema. No pallor.   Nursing note and vitals reviewed.        CRANIAL NERVES     CN III, IV, VI   Pupils are equal, round, and reactive to light.       Significant Labs: All pertinent labs within the past 24 hours have  been reviewed.    Significant Imaging: I have reviewed and interpreted all pertinent imaging results/findings within the past 24 hours.

## 2019-06-29 NOTE — Clinical Note
139 ml injected throughout the case. 40 mL total wasted during the case. 179 mL total used in the case.

## 2019-06-29 NOTE — ASSESSMENT & PLAN NOTE
Intubated for acute hypercapnic respiratory failure.  Suspect volume +/- sedatives.  Currently doing well with vent support.  Agitate off sedation.  Will switch to precedex for possible sbt in am.  Will continue vent support until cardiac status is better define.

## 2019-06-29 NOTE — CARE UPDATE
Patient transferred to ICU and placed back on ventilator with charted settings. Will continue to monitor

## 2019-06-29 NOTE — ED NOTES
Pt spitting up, per MD waiting to place bipap while pt may be vomiting to avoid aspiration.    MD aware of pt's elevated BP and HR.

## 2019-06-29 NOTE — HPI
Patient is 74 y.o. female  has a past medical history of Asthma, Cancer (10/10/2014), CHF (congestive heart failure), Cigarette smoker, COPD (chronic obstructive pulmonary disease), Diabetes mellitus, Diabetes mellitus type II, Emphysema of lung, [place] (hard of hearing), Hypercholesteremia, Hypertension, Myocardial infarct (12/25/2013), On home oxygen therapy, Palpitations, Pneumonia (03/07/2019), PVD (peripheral vascular disease), Seizures, Stroke, and Unsteady gait. presented to [facility] on 5/29/19 with chest pain.  Patient was given asa and morphine in ED.   Per ED note, patient was placed on bipap for increased wob.  Subsequently intubated for acute on chronic hypercapnic respiratory failure despite bipap.      Upon my evaluation, patient is comfortable on 40% fio2.  Vitals stable without pressors support.

## 2019-06-29 NOTE — CONSULTS
[facility]  Pulmonology  Consult Note    Patient Name: Susan Howell  MRN: [id number]  Admission Date: 2019  Hospital Length of Stay: 0 days  Code Status: Full Code  Attending Physician: Ezequiel Gonzalez MD  Primary Care Provider: Linda Mckee MD   Principal Problem: <principal problem not specified>    Inpatient consult to Pulmonology  Consult performed by: Christ Crum MD  Consult ordered by: Ezequiel Gonzalez MD        Subjective:     HPI:  Patient is 74 y.o. female  has a past medical history of Asthma, Cancer (10/10/2014), CHF (congestive heart failure), Cigarette smoker, COPD (chronic obstructive pulmonary disease), Diabetes mellitus, Diabetes mellitus type II, Emphysema of lung, [place] (hard of hearing), Hypercholesteremia, Hypertension, Myocardial infarct (2013), On home oxygen therapy, Palpitations, Pneumonia (2019), PVD (peripheral vascular disease), Seizures, Stroke, and Unsteady gait. presented to [facility] on 19 with chest pain.  Patient was given asa and morphine in ED.   Per ED note, patient was placed on bipap for increased wob.  Subsequently intubated for acute on chronic hypercapnic respiratory failure despite bipap.      Upon my evaluation, patient is comfortable on 40% fio2.  Vitals stable without pressors support.      Past Medical History:   Diagnosis Date    Asthma     Cancer 10/10/2014    Bone cancer     CHF (congestive heart failure)     Cigarette smoker     COPD (chronic obstructive pulmonary disease)     Diabetes mellitus     Diabetes mellitus type II     Emphysema of lung     [place] (hard of hearing)     Hypercholesteremia     Hypertension     Myocardial infarct 2013    On home oxygen therapy     Palpitations     Pneumonia 2019    PVD (peripheral vascular disease)     Seizures     Stroke     Unsteady gait     uses a cane & walker       Past Surgical History:   Procedure Laterality Date    APPENDECTOMY        "SECTION      CHOLECYSTECTOMY      HYSTERECTOMY      Left heart cath Left 6/14/2019    Performed by Fermin [facility] at [facility] CATH LAB    VASCULAR SURGERY      stent in L leg; unable to place in R leg d/t blockage       Review of patient's allergies indicates:   Allergen Reactions    Morphine      Pt states, "I'm not allergic to morphine they gave me too much at [facility]. Instead of giving me 2mg she gave me 5mg and I was almost dead."     Codeine      Blisters vs sweling (pt unsure which one)    Iodinated contrast- oral and iv dye     Iodine containing multivitamin      Blisters vs swelling (pt unsure which one.)    Lidocaine        Family History     Problem Relation (Age of Onset)    Asthma Mother    Breast cancer Sister    Cancer Father    Diabetes Mother        Tobacco Use    Smoking status: Current Every Day Smoker     Packs/day: 0.50     Years: 56.00     Pack years: 28.00     Types: Cigarettes    Smokeless tobacco: Current User   Substance and Sexual Activity    Alcohol use: No    Drug use: No    Sexual activity: Never         Review of Systems   Unable to perform ROS: Intubated     Objective:     Vital Signs (Most Recent):  Temp: 97.4 °F (36.3 °C) (06/29/19 0830)  Pulse: 82 (06/29/19 1014)  Resp: (!) 0 (06/29/19 1014)  BP: (!) 120/58 (06/29/19 0937)  SpO2: 100 % (06/29/19 1014) Vital Signs (24h Range):  Temp:  [95.1 °F (35.1 °C)-98.9 °F (37.2 °C)] 97.4 °F (36.3 °C)  Pulse:  [] 82  Resp:  [0-28] 0  SpO2:  [90 %-100 %] 100 %  BP: (100-226)/() 120/58     Weight: 60.9 kg (134 lb 4.2 oz)  Body mass index is 23.05 kg/m².      Intake/Output Summary (Last 24 hours) at 6/29/2019 1127  Last data filed at 6/29/2019 0605  Gross per 24 hour   Intake --   Output 700 ml   Net -700 ml       Physical Exam   Constitutional: She appears well-developed.   HENT:   Head: Normocephalic and atraumatic.   Nose: Nose normal.   Mouth/Throat: Oropharynx is clear and moist.   ett in place.   Eyes: Pupils " are equal, round, and reactive to light. EOM are normal.   Neck: Normal range of motion.   Cardiovascular: Normal rate, regular rhythm and normal heart sounds.   Pulmonary/Chest: Effort normal and breath sounds normal. No respiratory distress. She has no wheezes. She has no rales. She exhibits no tenderness.   Abdominal: Soft. Bowel sounds are normal. She exhibits no mass. There is no rebound and no guarding.   Genitourinary:   Genitourinary Comments: Boggs in place   Musculoskeletal: She exhibits no edema or tenderness.   Neurological: No cranial nerve deficit. Coordination normal.   sedated   Skin: No rash noted. No erythema. No pallor.   Psychiatric: She has a normal mood and affect. Her behavior is normal.       Vents:  Vent Mode: PRVC (06/29/19 1014)  Ventilator Initiated: Yes (06/29/19 0457)  Set Rate: 16 bmp (06/29/19 1014)  Vt Set: 450 mL (06/29/19 1014)  PEEP/CPAP: 5 cmH20 (06/29/19 1014)  Oxygen Concentration (%): 40 (06/29/19 1014)  Peak Airway Pressure: 22 cmH2O (06/29/19 1014)  Total Ve: 7.3 mL (06/29/19 1014)  F/VT Ratio<105 (RSBI): (!) 0 (06/29/19 1014)    Lines/Drains/Airways     Drain                 NG/OG Tube 06/29/19 0515 orogastric 16 Fr. Left mouth less than 1 day         Urethral Catheter 06/29/19 0520 Latex 18 Fr. less than 1 day          Airway                 Airway - Non-Surgical 06/29/19 0454 Endotracheal Tube less than 1 day          Peripheral Intravenous Line                 Peripheral IV - Single Lumen 06/29/19 0148 20 G Antecubital less than 1 day         Peripheral IV - Single Lumen 06/29/19 0350 22 G Right Forearm less than 1 day                Significant Labs:    CBC/Anemia Profile:  Recent Labs   Lab 06/29/19 0215   WBC 12.66   HGB 10.2*   HCT 32.3*      MCV 81*   RDW 21.2*        Chemistries:  Recent Labs   Lab 06/29/19 0215      K 4.5      CO2 23   BUN 20   CREATININE 0.8   CALCIUM 8.8   ALBUMIN 3.6   PROT 7.0   BILITOT 0.1   ALKPHOS 67   ALT 10   AST 21    MG 2.4     BNP  Recent Labs   Lab 06/29/19  0215   *     Recent Labs   Lab 06/29/19  0941   TROPONINI 20.093*      ABGs:   Recent Labs   Lab 06/29/19  0937   PH 7.298*   PCO2 46.3*   HCO3 22.6*   POCSATURATED 99   BE -4       Significant Imaging:   CXR: I have reviewed all pertinent results/findings within the past 24 hours and my personal findings are:  no consolidation or effusion.  +increased reticular markings    Assessment/Plan:     Encephalopathy  Morphine and ativan while in ed.  Will hold sedation and reassess.      Acute respiratory failure with hypoxia and hypercapnia  Intubated for acute hypercapnic respiratory failure.  Suspect volume +/- sedatives.  Currently doing well with vent support.  Agitate off sedation.  Will switch to precedex for possible sbt in am.  Will continue vent support until cardiac status is better define.      NSTEMI (non-ST elevated myocardial infarction)  Plavix, Asa, lovenox.  Await cards inputs.  Echo pending.      COPD (chronic obstructive pulmonary disease)  There was mentioning of wheezing per ED's note.  Patient is clear per my evaluation.  Will wean prednisone.          Thank you for your consult. I will follow-up with patient. Please contact us if you have any additional questions.     Christ Crum MD  Pulmonology  [facility]      Critical Care Time: 35  minutes  Critical secondary to respiratory failure     Critical care was time spent personally by me on the following activities: development of treatment plan with patient or surrogate and bedside caregivers, discussions with consultants, evaluation of patient's response to treatment, examination of patient, ordering and performing treatments and interventions, ordering and review of laboratory studies, ordering and review of radiographic studies, pulse oximetry, re-evaluation of patient's condition.  This critical care time did not overlap with that of any other provider or involve time for  any procedures.

## 2019-06-29 NOTE — ED NOTES
Called for BIPAP placement. Patient moved to ER Room 3. More vomiting noted. Notified Dr. Diop of patient status. Patient remains on NC 3 lpm at this time. ABG obtained at 03:30. Results PH 7.310, pc02 49.4, p02 52, BE -2, HC03 24.9. SP02 82%. Patient placed on BIPAP 10/5, RATE 14, FI02 30% with inline continuous aerosol treatment initiated. Patient is very agitated, pulling off BIPAP mask and trying to get out of bed. Another  ABG obtained per Dr. Diop. Results PH 7.102, PC02 85.5, P02 77, BE -5, HC03 26.7 SP02 88.  Intubation initiated at 0445. Patient was placed on servoi ventilator after intubation. Patient has 7.5 ETT secured at 21cm to right side of lip. Sputum culture obtained. Ventilator settings PRVC RATE 14, TIDAL VOLUME 450 ml, PEEP +5, FI02 60%. All ventilator alarms on, set and functioning. ambu bag and mask at bedside. Will continue to monitor.

## 2019-06-29 NOTE — HPI
Patient is intubated and sedated during my assessment. Hx taken from chart review.    74 yof with COPD on 2 L HOT, CAD, GERD, Chronic combined systolic diastolic heart failure, HTN, HLD, DMT2, Tobacco abuse, with recent admission for NSTEMI s/p [facility] with PCI to Lcx presenting to ED with non radiating substernal chest discomfort that started after dinner last night (6/28) with associated nasuea. Denied any fever, dyspnea, cough, abdominal pain, back pain, emesis. She described her pain different from her prior NSTEMI chest pain. Nitro by EMS didn't get. Given GI cocktail, morphine and ativan which didn't help either. Patient became more tachypnea despite duonebs, placed for bipap. Given lasix for suspected CHF exacerbation. Subsequently intubated for acute on chronic hypercapnic respiratory failure. Admitted to ICU.

## 2019-06-29 NOTE — ED PROVIDER NOTES
"Encounter Date: 6/29/2019       History     Chief Complaint   Patient presents with    Chest Pain     x 1 hour, took 2 nitro at home with no relief, given 2 nitros with EMS, 325 asa and 1 inch nitro paste, pt reports nausea and 10/10 at this time     Patient presents complaining substernal chest discomfort that started shortly after eating pizza and sweets with orange juice to get her blood sugar tonight.  No radiation of pain. She denies shortness of breath.  She denies back pain or abdominal pain. Patient states she feels like she has to throw up.  Patient states that the pain is different than her recent acute myocardial infarction.  Patient was given nitroglycerin by EMS without any improvement.  She was also given 3 mg of morphine.  She states she still having discomfort.  Patient denies cough or fever.  She states that she felt well earlier today.  No leg swelling or pain. Patient was just discharged from hospital last week after heart catheterization for an NSTEMI.  She had a new stent placed in her left circumflex artery.  She also has history of COPD.  Also has history of GERD.        Review of patient's allergies indicates:   Allergen Reactions    Morphine      Pt states, "I'm not allergic to morphine they gave me too much at [facility]. Instead of giving me 2mg she gave me 5mg and I was almost dead."     Codeine      Blisters vs sweling (pt unsure which one)    Iodinated contrast- oral and iv dye     Iodine containing multivitamin      Blisters vs swelling (pt unsure which one.)    Lidocaine      Past Medical History:   Diagnosis Date    Asthma     Cancer 10/10/2014    Bone cancer     CHF (congestive heart failure)     Cigarette smoker     COPD (chronic obstructive pulmonary disease)     Diabetes mellitus     Diabetes mellitus type II     Emphysema of lung     [place]hard [place] hearing[place]     Hypercholesteremia     Hypertension     Myocardial infarct 12/25/2013    On home oxygen therapy     " Palpitations     Pneumonia 2019    PVD (peripheral vascular disease)     Seizures     Stroke     Unsteady gait     uses a cane & walker     Past Surgical History:   Procedure Laterality Date    APPENDECTOMY       SECTION      CHOLECYSTECTOMY      HYSTERECTOMY      Left heart cath Left 2019    Performed by Fermin [facility] at [facility] CATH LAB    VASCULAR SURGERY      stent in L leg; unable to place in R leg d/t blockage     Family History   Problem Relation Age of Onset    Diabetes Mother     Asthma Mother     Cancer Father     Breast cancer Sister     Colon cancer Neg Hx     Ovarian cancer Neg Hx      Social History     Tobacco Use    Smoking status: Current Every Day Smoker     Packs/day: 0.50     Years: 56.00     Pack years: 28.00     Types: Cigarettes    Smokeless tobacco: Current User   Substance Use Topics    Alcohol use: No    Drug use: No     Review of Systems   Constitutional: Negative for chills, diaphoresis and fever.   HENT: Negative for congestion and sinus pain.    Eyes: Negative for pain and visual disturbance.   Respiratory: Positive for wheezing. Negative for cough, choking and shortness of breath.    Cardiovascular: Positive for chest pain. Negative for palpitations and leg swelling.   Gastrointestinal: Negative for abdominal pain, diarrhea, nausea and vomiting.        No melena.   Genitourinary: Negative for dysuria and vaginal bleeding.   Musculoskeletal: Negative for back pain and neck pain.   Skin: Negative for rash and wound.   Neurological: Negative for dizziness, speech difficulty, weakness, numbness and headaches.   All other systems reviewed and are negative.      Physical Exam     Initial Vitals [19 0147]   BP Pulse Resp Temp SpO2   (!) 148/74 80 18 98.9 °F (37.2 °C) 95 %      MAP       --         Physical Exam    Nursing note and vitals reviewed.  Constitutional: She appears well-developed and well-nourished. She is not diaphoretic. No  distress.   Patient appears uncomfortable.   HENT:   Head: Normocephalic and atraumatic.   Nose: Nose normal.   Mouth/Throat: Oropharynx is clear and moist.   Eyes: Conjunctivae and EOM are normal. Pupils are equal, round, and reactive to light. Right eye exhibits no discharge. Left eye exhibits no discharge.   Neck: Normal range of motion. Neck supple. No thyromegaly present. No tracheal deviation present.   Cardiovascular: Normal rate, regular rhythm and normal heart sounds.   No murmur heard.  Pulmonary/Chest: No stridor. No respiratory distress. She has wheezes. She has rhonchi. She has no rales.   Abdominal: Soft. She exhibits no distension. There is no tenderness. There is no rebound and no guarding.   Musculoskeletal: Normal range of motion. She exhibits no edema or tenderness.   Neurological: She is alert and oriented to person, place, and time. She has normal strength. No cranial nerve deficit. GCS score is 15. GCS eye subscore is 4. GCS verbal subscore is 5. GCS motor subscore is 6.   Skin: Skin is warm and dry. No rash noted. No erythema.   Psychiatric: Her behavior is normal. Judgment and thought content normal.   Patient appears anxious.         ED Course   Critical Care  Date/Time: 6/29/2019 3:03 AM  Performed by: Ash Diop MD  Authorized by: Ash Diop MD   Direct patient critical care time: 30 minutes  Additional history critical care time: 10 minutes  Ordering / reviewing critical care time: 10 minutes  Documentation critical care time: 10 minutes  Total critical care time (exclusive of procedural time) : 60 minutes  Critical care was time spent personally by me on the following activities: pulse oximetry, re-evaluation of patient's condition, review of old charts, ordering and review of radiographic studies, ordering and review of laboratory studies, examination of patient, ordering and performing treatments and interventions and evaluation of patient's response to  treatment.    Intubation  Date/Time: 6/29/2019 5:00 AM  Performed by: Ash Diop MD  Authorized by: Ash Diop MD   Consent Done: Emergent Situation  Indications: respiratory distress,  respiratory failure and  hypercapnia  Intubation method: direct  Sedatives: etomidate  Paralytic: succinylcholine  Laryngoscope size: Mac 4  Tube size: 7.5 mm  Tube type: cuffed  Number of attempts: 1  Ventilation between attempts: BVM  Cricoid pressure: yes  Cords visualized: yes  Post-procedure assessment: chest rise and CO2 detector  Breath sounds: wheezing  Cuff inflated: yes  ETT to lip: 21 cm  Tube secured with: ETT davalos  Chest x-ray interpreted by me.  Chest x-ray findings: endotracheal tube in appropriate position  Complications: No        Labs Reviewed   COMPREHENSIVE METABOLIC PANEL - Abnormal; Notable for the following components:       Result Value    Glucose 219 (*)     All other components within normal limits   TROPONIN I - Abnormal; Notable for the following components:    Troponin I 0.280 (*)     All other components within normal limits   B-TYPE NATRIURETIC PEPTIDE - Abnormal; Notable for the following components:     (*)     All other components within normal limits   MAGNESIUM   CBC W/ AUTO DIFFERENTIAL     EKG Readings: (Independently Interpreted)   Initial Reading: No STEMI. Heart Rate: 75. Ectopy: No Ectopy. Conduction: Normal. ST Segment Depression: II, AVF, V4, V5 and V6. T Waves: Normal. Axis: Normal. Q Waves: V1 and V2. Other Findings: Prolonged QT Interval.   Left ventricular hypertrophy.  T-wave inversions that were present in the lateral leads on previous admission resolved.  Patient does have ST depression in the lateral leads at this point however.  Patient also has new ST depression in the inferior leads compared to previous.       Imaging Results          X-Ray Chest AP Portable (Final result)  Result time 06/29/19 02:34:55    Final result by Gulshan Murrell MD (06/29/19  02:34:55)                 Impression:      Chronic coarse interstitial lung markings which appear somewhat more pronounced compared to prior examination which could relate to superimposed component of interstitial edema.  Clinical correlation advised.      Electronically signed by: Gulshan Murrell MD  Date:    06/29/2019  Time:    02:34             Narrative:    EXAMINATION:  XR CHEST AP PORTABLE    CLINICAL HISTORY:  Chest Pain;    TECHNIQUE:  Single frontal view of the chest was performed.    COMPARISON:  06/13/2019, 05/29/2019    FINDINGS:  Cardiac monitoring leads overlie the chest.  The cardiomediastinal silhouette appears stable from prior examination.  There is prominence of the central pulmonary vasculature.  There are coarse interstitial lung markings bilaterally which appear mildly accentuated compared to prior examination.  There is mild elevation left hemidiaphragm.  No evidence of large pleural effusion or pneumothorax.  The visualized osseous structures demonstrate degenerative changes.                                 Medical Decision Making:   Differential Diagnosis:   Acute coronary syndrome, CHF, GERD, COPD, anxiety  Clinical Tests:   Lab Tests: Ordered and Reviewed  The following lab test(s) were unremarkable: CBC, CMP, Troponin and BNP  Radiological Study: Ordered and Reviewed  Medical Tests: Ordered and Reviewed  ED Management:  0245:  No improvement with GI cocktail.  Patient still appears tachypneic after DuoNeb.  Crackles and rhonchi present on exam.  Chest x-ray consistent with worsening pattern CHF.  Most recent echocardiogram showed ejection fraction 35% with grade 2 diastolic dysfunction.  Will give Lasix.  Will place patient on BiPAP.  Heart score is 9.  She still complains of chest discomfort.  Will admit patient for acute coronary syndrome with CHF exacerbation. Patient remains hypertensive.  Was started on nitroglycerin drip.  I suspect the sodium content in the food she was eating for  her hypoglycemia may have caused her CHF exacerbation as well some GERD symptoms. Patient recently had a stent placed in left circumflex artery.  She is currently on Plavix.  Troponin is mild-to-moderately elevated.  This may be residual from recent NSTEMI.  Cannot exclude acute injury at this time since patient has continued to have chest pain.  Will also start on Lovenox.  Will trend cardiac enzymes on admission.  Discussed results with patient.    0410:  Patient becoming more progressively shortness of breath initially after being put on BiPAP.  Repeat evaluation reveals poor air movement and wheezing.  Will start albuterol continuous nebulization and give Solu-Medrol for suspected acute COPD exacerbation in addition her CHF.  Blood pressure had become more elevated and nitro glycerin drip was titrated to effect.  Will re-evaluate ABG.  If not improving with bronchodilators patient may have to be intubated.    0500:  Repeat ABG significantly worse compared to previous.  Patient's work of breathing not improving after continuous albuterol.  Will intubate.    Additional MDM:   Heart Score:    History:          Moderately suspicious.  ECG:             Significant ST depression  Age:               >65 years  Risk factors: >= 3 risk factors or history of atherosclerotic disease  Troponin:       >2x normal limit  Final Score: 9                       Clinical Impression:       ICD-10-CM ICD-9-CM   1. Unstable angina I20.0 411.1   2. Chest pain R07.9 786.50   3. Acute on chronic combined systolic and diastolic congestive heart failure I50.43 428.43     428.0   4. Chronic obstructive pulmonary disease, unspecified COPD type J44.9 496   5. Gastroesophageal reflux disease, esophagitis presence not specified K21.9 530.81         Disposition:   Disposition: Admitted  Condition: Stable                        Ash Diop MD  06/29/19 0303       Ash Diop MD  06/29/19 0311       Ash Diop MD  06/29/19  0429       Ash Diop MD  06/29/19 [id number]

## 2019-06-29 NOTE — ASSESSMENT & PLAN NOTE
Presenting with atypical non radiating chest pain. Initial trop 0.2 ---> 20 in 7 hrs. Continues to smoker tobacco. Daughter states patient is complaint with her home meds, but not completely sure. Last echo with EF 35% + moderate DD. Recently had a angiogram 6/14 with stent to Lcx. ?Stent thrombosis.  - Cardiology consulted  - Lovenox BID therapeutic dosing  - BB, statin, Plavix and asa 81  - TTE pending  - Lasix IV 40 mg bid for now  - Tentative angiogram as per cards

## 2019-06-29 NOTE — ASSESSMENT & PLAN NOTE
Presented to ED with substernal CP after dinner. Stated to be different from her prior MI 2 weeks ago. Initially saturating well on RA (as per chart review). After receiving morphine, fluids, and lorazepam x 2 patient became more hypoxic and hypercarbic. Suspected iatrogenic at this time. Has hx of copd.  - Currently she is comfortable on 40% FIO2 wo any pressor support  - Pulmonary consulted for vent mgmt  - Propofol changed to precedex  - Duonebs  - Tobacco cessation

## 2019-06-29 NOTE — H&P
[facility] Hospital Medicine  History & Physical    Patient Name: Susan Howell  MRN: [id number]  Admission Date: 6/29/2019  Attending Physician: Ezequiel Gonzalez MD   Primary Care Provider: Linda Mckee MD         Patient information was obtained from relative(s), past medical records and ER records.     Subjective:     Principal Problem:Acute respiratory failure with hypoxia and hypercapnia    Chief Complaint:   Chief Complaint   Patient presents with    Chest Pain     x 1 hour, took 2 nitro at home with no relief, given 2 nitros with EMS, 325 asa and 1 inch nitro paste, pt reports nausea and 10/10 at this time        HPI: Patient is intubated and sedated during my assessment. Hx taken from chart review.    74 yof with COPD on 2 L HOT, CAD, GERD, Chronic combined systolic diastolic heart failure, HTN, HLD, DMT2, Tobacco abuse, with recent admission for NSTEMI s/p C with PCI to Lcx presenting to ED with non radiating substernal chest discomfort that started after dinner last night (6/28) with associated nasuea. Denied any fever, dyspnea, cough, abdominal pain, back pain, emesis. She described her pain different from her prior NSTEMI chest pain. Nitro by EMS didn't get. Given GI cocktail, morphine and ativan which didn't help either. Patient became more tachypnea despite duonebs, placed for bipap. Given lasix for suspected CHF exacerbation. Subsequently intubated for acute on chronic hypercapnic respiratory failure. Admitted to ICU.    Past Medical History:   Diagnosis Date    Asthma     Cancer 10/10/2014    Bone cancer     CHF (congestive heart failure)     Cigarette smoker     COPD (chronic obstructive pulmonary disease)     Diabetes mellitus     Diabetes mellitus type II     Emphysema of lung     [place]     Hypercholesteremia     Hypertension     Myocardial infarct 12/25/2013    On home oxygen therapy     Palpitations     Pneumonia 03/07/2019    PVD (peripheral  "vascular disease)     Seizures     Stroke     Unsteady gait     uses a cane & walker       Past Surgical History:   Procedure Laterality Date    APPENDECTOMY       SECTION      CHOLECYSTECTOMY      HYSTERECTOMY      Left heart cath Left 2019    Performed by Fermin Healy MD at [facility] CATH LAB    VASCULAR SURGERY      stent in L leg; unable to place in R leg d/t blockage       Review of patient's allergies indicates:   Allergen Reactions    Morphine      Pt states, "I'm not allergic to morphine they gave me too much at [facility]. Instead of giving me 2mg she gave me 5mg and I was almost dead."     Codeine      Blisters vs sweling (pt unsure which one)    Iodinated contrast- oral and iv dye     Iodine containing multivitamin      Blisters vs swelling (pt unsure which one.)    Lidocaine        No current facility-administered medications on file prior to encounter.      Current Outpatient Medications on File Prior to Encounter   Medication Sig    acetaminophen (TYLENOL) 325 MG tablet Take 2 tablets (650 mg total) by mouth every 4 (four) hours as needed for Pain or Temperature greater than (100).    ADVAIR DISKUS 100-50 mcg/dose diskus inhaler Take 1 puff by mouth Twice daily.    alendronate (FOSAMAX) 70 MG tablet Take 70 mg by mouth.    amlodipine (NORVASC) 10 MG tablet Take 1 tablet (10 mg total) by mouth once daily.    ANORO ELLIPTA 62.5-25 mcg/actuation DsDv     aspirin (ECOTRIN) 81 MG EC tablet Take 81 mg by mouth once daily.      atorvastatin (LIPITOR) 80 MG tablet TK 1 T PO D    BD ULTRA-FINE ANISHA PEN NEEDLE 32 gauge x 5/32" Ndle use for insulin up to FOUR TIMES DAILY    BIDIL 20-37.5 mg Tab TK 1 T PO TID    calcium-vitamin D (CALCIUM-VITAMIN D) 500 mg(1,250mg) -200 unit per tablet Take 1 tablet by mouth 2 (two) times daily with meals.      cloNIDine (CATAPRES) 0.1 MG tablet Take 1 tablet (0.1 mg total) by mouth 2 (two) times daily.    clopidogrel (PLAVIX) 75 mg tablet " TK 1 T PO D    ergocalciferol (ERGOCALCIFEROL) 50,000 unit Cap Take 50,000 Units by mouth every 7 days.      famotidine (PEPCID) 40 MG tablet     furosemide (LASIX) 40 MG tablet Take 1 tablet (40 mg total) by mouth 2 (two) times daily.    gabapentin (NEURONTIN) 300 MG capsule     hydrocodone-acetaminophen 5-325mg (NORCO) 5-325 mg per tablet Take 1 tablet by mouth 3 (three) times daily as needed.     imatinib (GLEEVEC) 400 MG Tab Take 1 tablet (400 mg total) by mouth once daily.    insulin glargine (LANTUS) 100 unit/mL injection Inject 18 Units into the skin every evening.    isosorbide mononitrate (IMDUR) 30 MG 24 hr tablet Take 1 tablet (30 mg total) by mouth once daily.    lisinopril (PRINIVIL,ZESTRIL) 40 MG tablet Take 1 tablet (40 mg total) by mouth once daily.    metoprolol tartrate (LOPRESSOR) 25 MG tablet TK 1 T PO BID    nicotine (NICODERM CQ) 21 mg/24 hr Place 1 patch onto the skin once daily.    nitroGLYCERIN (NITROSTAT) 0.4 MG SL tablet Place 1 tablet (0.4 mg total) under the tongue every 5 (five) minutes as needed for Chest pain.    prednisoLONE acetate (PRED FORTE) 1 % DrpS     ranitidine (ZANTAC) 300 MG tablet TK 1 T PO QHS    ranolazine (RANEXA) 500 MG Tb12 Take 500 mg by mouth 2 (two) times daily.    tiotropium (SPIRIVA) 18 mcg inhalation capsule Inhale 18 mcg into the lungs once daily.      VENTOLIN HFA 90 mcg/actuation HFAA Inhale 2 puffs into the lungs every 4 to 6 hours as needed.     Family History     Problem Relation (Age of Onset)    Asthma Mother    Breast cancer Sister    Cancer Father    Diabetes Mother        Tobacco Use    Smoking status: Current Every Day Smoker     Packs/day: 0.50     Years: 56.00     Pack years: 28.00     Types: Cigarettes    Smokeless tobacco: Current User   Substance and Sexual Activity    Alcohol use: No    Drug use: No    Sexual activity: Never     Review of Systems   Unable to perform ROS: Intubated     Objective:     Vital Signs (Most  Recent):  Temp: 97.4 °F (36.3 °C) (06/29/19 0830)  Pulse: 96 (06/29/19 1132)  Resp: (!) 28 (06/29/19 1132)  BP: 134/74 (06/29/19 1132)  SpO2: 100 % (06/29/19 1132) Vital Signs (24h Range):  Temp:  [95.1 °F (35.1 °C)-98.9 °F (37.2 °C)] 97.4 °F (36.3 °C)  Pulse:  [] 96  Resp:  [0-28] 28  SpO2:  [90 %-100 %] 100 %  BP: (100-226)/() 134/74     Weight: 60.9 kg (134 lb 4.2 oz)  Body mass index is 23.05 kg/m².    Physical Exam   Constitutional: She appears well-developed and well-nourished.   HENT:   Head: Normocephalic and atraumatic.   Mouth/Throat: Oropharynx is clear and moist.   Eyes: Pupils are equal, round, and reactive to light. No scleral icterus.   Neck: Normal range of motion. Neck supple.   Cardiovascular: Normal rate, regular rhythm and normal heart sounds.   Pulmonary/Chest: Effort normal and breath sounds normal. She has no wheezes. She has no rales.   Intubated   Abdominal: Soft. Bowel sounds are normal. She exhibits no distension and no mass. There is no guarding.   Genitourinary:   Genitourinary Comments: [place]   Musculoskeletal: She exhibits no edema, tenderness or deformity.   Neurological:   Intubated and sedated with RASS -2   Skin: Skin is warm and dry. Capillary refill takes less than 2 seconds. No rash noted. No erythema. No pallor.   Nursing note and vitals reviewed.        CRANIAL NERVES     CN III, IV, VI   Pupils are equal, round, and reactive to light.       Significant Labs: All pertinent labs within the past 24 hours have been reviewed.    Significant Imaging: I have reviewed and interpreted all pertinent imaging results/findings within the past 24 hours.    Assessment/Plan:     * Acute respiratory failure with hypoxia and hypercapnia  Presented to ED with substernal CP after dinner. Stated to be different from her prior MI 2 weeks ago. Initially saturating well on RA (as per chart review). After receiving morphine, fluids, and lorazepam x 2 patient became more hypoxic and  hypercarbic. Suspected iatrogenic at this time. Has hx of copd.  - Currently she is comfortable on 40% FIO2 wo any pressor support  - Pulmonary consulted for vent mgmt  - Propofol changed to precedex  - Duonebs  - Tobacco cessation    NSTEMI (non-ST elevated myocardial infarction)  Presenting with atypical non radiating chest pain. Initial trop 0.2 ---> 20 in 7 hrs. Continues to smoker tobacco. Daughter states patient is complaint with her home meds, but not completely sure. Last echo with EF 35% + moderate DD. Recently had a angiogram 6/14 with stent to Lcx. ?Stent thrombosis.  - Cardiology consulted  - Lovenox BID therapeutic dosing  - BB, statin, Plavix and asa 81  - TTE pending  - Lasix IV 40 mg bid for now  - Tentative angiogram as per cards    Acute encephalopathy  Iatrogenic meds --> hypoxia, hypercarbia induced    Combined Systolic diastolic congestive heart failure  As above.    Acid reflux  Noted.      Anemia of chronic disease  Noted. Stable.      Hyperlipidemia  High intensity statin      History of CVA (cerebrovascular accident)  Noted. Continue ASA, plavix, statin and BB      Type 2 diabetes mellitus, uncontrolled  Hold all oral and home meds  Start levemir 5 units daily  SSI and accucheck  Titrate insulin as needed    CAD s/p PCI  As above      Tobacco abuse  Still actively smoking as per daughter.      PVD (peripheral vascular disease)  Noted. ASA statin plavix       COPD (chronic obstructive pulmonary disease)  Noted. Duonebs. No wheeze. Tapering prednisone as per pulmonary     Hypertension  Home all home antihypertensive meds except for metoprolol         VTE Risk Mitigation (From admission, onward)        Ordered     enoxaparin injection 60 mg  Every 12 hours (non-standard times)      06/29/19 0096        Critical care time spent on the evaluation and treatment of severe organ dysfunction, review of pertinent labs and imaging studies, discussions with consulting providers and discussions with  patient/family: 60 minutes.     Ezequiel Gonzalez MD  Department of Hospital Medicine   [facility]

## 2019-06-29 NOTE — CARE UPDATE
[facility]  ICU Multidisciplinary Bedside Rounds     UPDATE     Date: 6/29/2019      Plan of care reviewed with the following, Nurse, Charge Nurse, Physician and Resp. Therapist.       Needs/ Goals for the day: consult cardiology, consult pulmonary, start accuchecks every 6hrs      Level of Care: Critical Care

## 2019-06-29 NOTE — CARE UPDATE
[facility]  ICU Multidisciplinary Bedside Rounds   SUMMARY     Date: 6/29/2019    Prehospitalization: Home  Admit Date / LOS : 6/29/2019/ 0 days    Diagnosis: <principal problem not specified>    Consults:        Active: Pulm CC       Needed: Cardio     Code Status: Full Code  Advanced Directive: <no information>    LDA: Martin Young, [place], OG, PIV and Vent       Central Lines/Site/Justification:Patient Does Not Have Central Line       Urinary Cath/Order/Justification:Critically Ill in ICU    Infusions: Propofol       GOALS:                      RASS: -2  light sedation, briefly awakes to voice (eye opening)    CAM ICU: N/A  Pain Management: IV       Pain Controlled: yes     Rhythm: NSR    Respiratory Device: Vent         Vent Settings:   Mode  PRVC    FiO2 60 %        Rate 16    PEEP + 5    PS n/a        Most Recent SBT/ SAT: N/A          VTE Prophylaxis: Pharm  Mobility: Bedrest  Stress Ulcer Prophylaxis: Yes    Dietary: NPO  Tolerance: not applicable  /      Isolation: No active isolations    Restraints: Yes    Significant Dates:      Noteworthy Labs:  Ph 7.102, PCO2 85.5, PO2 77, Glucose 219    Needs from Care Team: Blood Sugar Monitoring     ICU LOS 1h  Level of Care: Critical Care

## 2019-06-29 NOTE — ED NOTES
Pt using abdominal muscles to breathe. MD aware of pt's increasing work to breathe. Respiratory has remained at bedside.

## 2019-06-29 NOTE — SUBJECTIVE & OBJECTIVE
"Past Medical History:   Diagnosis Date    Asthma     Cancer 10/10/2014    Bone cancer     CHF (congestive heart failure)     Cigarette smoker     COPD (chronic obstructive pulmonary disease)     Diabetes mellitus     Diabetes mellitus type II     Emphysema of lung     [place] (hard of hearing)     Hypercholesteremia     Hypertension     Myocardial infarct 2013    On home oxygen therapy     Palpitations     Pneumonia 2019    PVD (peripheral vascular disease)     Seizures     Stroke     Unsteady gait     uses a cane & walker       Past Surgical History:   Procedure Laterality Date    APPENDECTOMY       SECTION      CHOLECYSTECTOMY      HYSTERECTOMY      Left heart cath Left 2019    Performed by Fermin [facility]aly MD at [facility] CATH LAB    VASCULAR SURGERY      stent in L leg; unable to place in R leg d/t blockage       Review of patient's allergies indicates:   Allergen Reactions    Morphine      Pt states, "I'm not allergic to morphine they gave me too much at [facility]. Instead of giving me 2mg she gave me 5mg and I was almost dead."     Codeine      Blisters vs sweling (pt unsure which one)    Iodinated contrast- oral and iv dye     Iodine containing multivitamin      Blisters vs swelling (pt unsure which one.)    Lidocaine        Family History     Problem Relation (Age of Onset)    Asthma Mother    Breast cancer Sister    Cancer Father    Diabetes Mother        Tobacco Use    Smoking status: Current Every Day Smoker     Packs/day: 0.50     Years: 56.00     Pack years: 28.00     Types: Cigarettes    Smokeless tobacco: Current User   Substance and Sexual Activity    Alcohol use: No    Drug use: No    Sexual activity: Never         Review of Systems   Unable to perform ROS: Intubated     Objective:     Vital Signs (Most Recent):  Temp: 97.4 °F (36.3 °C) (19 0830)  Pulse: 82 (19 1014)  Resp: (!) 0 (19 1014)  BP: (!) 120/58 (19 0937)  SpO2: " 100 % (06/29/19 1014) Vital Signs (24h Range):  Temp:  [95.1 °F (35.1 °C)-98.9 °F (37.2 °C)] 97.4 °F (36.3 °C)  Pulse:  [] 82  Resp:  [0-28] 0  SpO2:  [90 %-100 %] 100 %  BP: (100-226)/() 120/58     Weight: 60.9 kg (134 lb 4.2 oz)  Body mass index is 23.05 kg/m².      Intake/Output Summary (Last 24 hours) at 6/29/2019 1127  Last data filed at 6/29/2019 0605  Gross per 24 hour   Intake --   Output 700 ml   Net -700 ml       Physical Exam   Constitutional: She appears well-developed.   HENT:   Head: Normocephalic and atraumatic.   Nose: Nose normal.   Mouth/Throat: Oropharynx is clear and moist.   ett in place.   Eyes: Pupils are equal, round, and reactive to light. EOM are normal.   Neck: Normal range of motion.   Cardiovascular: Normal rate, regular rhythm and normal heart sounds.   Pulmonary/Chest: Effort normal and breath sounds normal. No respiratory distress. She has no wheezes. She has no rales. She exhibits no tenderness.   Abdominal: Soft. Bowel sounds are normal. She exhibits no mass. There is no rebound and no guarding.   Genitourinary:   Genitourinary Comments: Boggs in place   Musculoskeletal: She exhibits no edema or tenderness.   Neurological: No cranial nerve deficit. Coordination normal.   sedated   Skin: No rash noted. No erythema. No pallor.   Psychiatric: She has a normal mood and affect. Her behavior is normal.       Vents:  Vent Mode: PRVC (06/29/19 1014)  Ventilator Initiated: Yes (06/29/19 0457)  Set Rate: 16 bmp (06/29/19 1014)  Vt Set: 450 mL (06/29/19 1014)  PEEP/CPAP: 5 cmH20 (06/29/19 1014)  Oxygen Concentration (%): 40 (06/29/19 1014)  Peak Airway Pressure: 22 cmH2O (06/29/19 1014)  Total Ve: 7.3 mL (06/29/19 1014)  F/VT Ratio<105 (RSBI): (!) 0 (06/29/19 1014)    Lines/Drains/Airways     Drain                 NG/OG Tube 06/29/19 0515 orogastric 16 Fr. Left mouth less than 1 day         Urethral Catheter 06/29/19 0520 Latex 18 Fr. less than 1 day          Airway                  Airway - Non-Surgical 06/29/19 0454 Endotracheal Tube less than 1 day          Peripheral Intravenous Line                 Peripheral IV - Single Lumen 06/29/19 0148 20 G Antecubital less than 1 day         Peripheral IV - Single Lumen 06/29/19 0350 22 G Right Forearm less than 1 day                Significant Labs:    CBC/Anemia Profile:  Recent Labs   Lab 06/29/19 0215   WBC 12.66   HGB 10.2*   HCT 32.3*      MCV 81*   RDW 21.2*        Chemistries:  Recent Labs   Lab 06/29/19 0215      K 4.5      CO2 23   BUN 20   CREATININE 0.8   CALCIUM 8.8   ALBUMIN 3.6   PROT 7.0   BILITOT 0.1   ALKPHOS 67   ALT 10   AST 21   MG 2.4     BNP  Recent Labs   Lab 06/29/19  0215   *     Recent Labs   Lab 06/29/19  0941   TROPONINI 20.093*      ABGs:   Recent Labs   Lab 06/29/19  0937   PH 7.298*   PCO2 46.3*   HCO3 22.6*   POCSATURATED 99   BE -4       Significant Imaging:   CXR: I have reviewed all pertinent results/findings within the past 24 hours and my personal findings are:  no consolidation or effusion.  +increased reticular markings

## 2019-06-29 NOTE — PLAN OF CARE
Patient resting quietly in bed w/ family present. She remains in vent fios 30%. sats 100%. No resp distress noted @ time. She's on Precedex drip for comfort. piv x 2 rt to rt and lt fa. VSS. Boggs to gravity w/ clear yellow yellow urine noted. 2pt soft restraints intact to danni wrist w/o constriction noted. Patient calm and cooperative @ this time. Comfort measure provided.

## 2019-06-29 NOTE — ED NOTES
Called for aerosol treatment. Aerosol treatment given. Patient vomiting. White, thick secretions also noted. Patient on nasal cannula 3 lpm.

## 2019-06-30 PROBLEM — I50.43 ACUTE ON CHRONIC COMBINED SYSTOLIC AND DIASTOLIC CONGESTIVE HEART FAILURE: Status: ACTIVE | Noted: 2019-01-01

## 2019-06-30 NOTE — CARE UPDATE
[facility]  ICU Multidisciplinary Bedside Rounds   SUMMARY     Date: 6/29/2019    Prehospitalization: Home  Admit Date / LOS : 6/29/2019/ 0 days    Diagnosis: Acute respiratory failure with hypoxia and hypercapnia    Consults:        Active: Cardio       Needed: N/A     Code Status: Full Code  Advanced Directive: <no information>    LDA: Martin Hugger, Boggs and Vent       Central Lines/Site/Justification:Patient Does Not Have Central Line       Urinary Cath/Order/Justification:Critically Ill in ICU    Infusions: Precedex, Propofol       GOALS: Volume/ Hemodynamic: N/A                     RASS: -2  light sedation, briefly awakes to voice (eye opening)    CAM ICU: Positive  Pain Management: none       Pain Controlled: yes     Rhythm: NSR    Respiratory Device: Vent         Vent Settings:   Mode  PRVC    FiO2 30 %        Rate 16    PEEP + 5    PS n/a        Most Recent SBT/ SAT: Did not perform       MOVE Screen: FAIL    VTE Prophylaxis: Pharm  Mobility: Bedrest  Stress Ulcer Prophylaxis: Yes    Dietary: NPO    Isolation: No active isolations    Restraints: Yes    Significant Dates:  Post Op Date: N/A  Rescue Date: N/A  Imaging/ Diagnostics: N/A    Noteworthy Labs:  Trop >50, cards aware, probably to cath lab in AM  AST, ALT up from previous day    Needs from Care Team: Pt is on Azithromycin but no mention of infection in H&P, is this still appropriate?     ICU LOS 16h  Level of Care: Critical Care

## 2019-06-30 NOTE — CARE UPDATE
[facility]  ICU Multidisciplinary Bedside Rounds     UPDATE     Date: 6/30/2019      Plan of care reviewed with the following, Nurse, Charge Nurse and Physician, Cards.       Needs/ Goals for the day: cath lab (Monday), d/c Azithromicin (done), d/c prednisone (done), repeat CXR (ordered), change vent rate ? (declined), extubate (waitying for Pulm).      Level of Care: Critical Care

## 2019-06-30 NOTE — ASSESSMENT & PLAN NOTE
Hold all oral and home meds  Start levemir 5 units daily --> changed to 10 units BID  SSI and accucheck  Titrate insulin as needed

## 2019-06-30 NOTE — HOSPITAL COURSE
Presented to ED with atypical non radiating substernal CP after dinner. Stated to be different from her prior MI 2 weeks ago. Initially saturating well on RA (as per chart review). After receiving morphine, fluids, and lorazepam x 2 patient became more hypoxic and hypercarbic, despite bipap. Intubated and sedated on propofol. Admitted to ICU. Trop initially 0.2. EKG NSR inferolateral ST depression. Cardiology consulted. Repeat Trop 0.2 --> 20 --> >50. Last echo with EF 35% + moderate DD. Recently had a angiogram 6/14 with stent to Lcx. ?Stent thrombosis. Was started on Lovenox BID therapeutic dosing while continuing BB, statin, Plavix and asa 81. Re-loaded on plavix. Repeat TTE with severely depressed EF. Lasix IV 40 mg bid started,cardiolgy was consulted,had  Cardiac Angiogram,  S/p [facility] 7/1 - SAM placed to mid LAD. Monitored in ICU overnight. Per cardiology, ok to transfer to telemetry,she was on ACS medications and remains stable on NC O 2 on floor,denis no chest pain or SOB,she has been discharged home with  and follow up with PCP and cardiology as out patient.

## 2019-06-30 NOTE — PLAN OF CARE
Problem: Adult Inpatient Plan of Care  Goal: Plan of Care Review  Outcome: Ongoing (interventions implemented as appropriate)  Pt remains in ICU on mechanical ventilation. Propofol reordered for extreme agitation on Precedex alone. Plan for cath lab in AM, NPO. VSS. Daughter at bedside updated on care.

## 2019-06-30 NOTE — ASSESSMENT & PLAN NOTE
Troponin > 50. Suspect Cx stent thrombosis. Discussed with Dr Le last PM - will attempt to stabilize respiratory status prior to repeating LHC, probable Monday. Plavix reloaded. Discussed with Dr Healy. Possible repeat LHC in AM if respiratory status improved. Overall poor prognosis

## 2019-06-30 NOTE — ASSESSMENT & PLAN NOTE
Intubated for acute hypercapnic respiratory failure.  Suspect volume +/- sedatives.  Diuresing well. Sbt.

## 2019-06-30 NOTE — HPI
74 yof with COPD on 2 L HOT, CAD, GERD, Chronic combined systolic diastolic heart failure, HTN, HLD, DMT2, Tobacco abuse, with recent admission for NSTEMI s/p [facility] with PCI to Lcx presenting to ED with non radiating substernal chest discomfort that started after dinner last night (6/28) with associated nasuea. Denied any fever, dyspnea, cough, abdominal pain, back pain, emesis. She described her pain different from her prior NSTEMI chest pain. Nitro by EMS didn't get. Given GI cocktail, morphine and ativan which didn't help either. Patient became more tachypnea despite duonebs, placed for bipap. Given lasix for suspected CHF exacerbation. Subsequently intubated for acute on chronic hypercapnic respiratory failure. Admitted to ICU.     On vent in ICU  EKG NSR inferolateral ST depression  Troponin 0.2 to 20 to >50    Echo prelim - severely depressed EF    Known to me  CAD - Cx stent 2014, remote stent LAD, Occluded RCA, NSTEMI with SAM Cx 6/15/19, EF 35%, HTN, HLD, DM     Admitted 6/13/19  Susan Howell 74 y.o. female with COPD, CAD, HTN, HLD, DM2, and tobacco abuse presents to the hospital with a chief complaint of SOB. She reports yesterday she became SOB, but today the symptoms worsened today while smoking a cigarette. She felt very anxious and she then developed left sided chest tightness that was without radiation and resolved with treatment by EMS. She states they gave her a breathing treatment. She also self treated the chest pain with nitro/aspirin at home. She states this pain does not feel like a previous MI. She states her chest pain is completely resolved. Her breathing has improved with duo-nebs and steroids improved. She endorses a non-productive cough that is not worsened compared to her baseline. She denies fever, N/V, abdominal pain, syncope, dizziness, orthopnea, leg swelling, and dysuria.      In the ED, BNP 1300, troponin 0.05 at baseline, chest x-ray without coarse interstitial markings, and  EKG of NSR with LVH.     Susan Howell 74 y.o. female with history for COPD on 2 L home oxygen, CAD sp MI/stent,  Combined systolic diastolic heart failure, HTN, HLD, DMT2 and current smoker is admitted for chest pain found to have NSTEMI and COPD exacerbation.  In the ED, EKG NSR  BNP 1300. Troponin 0.05 then up to 23 ,cardiogy was consulted,had [facility] with PCI on Lcx,was on ACS medications,  Her symptoms much improved,was chest pain free at DC time,patient has been discharged home with ACS medication and follow up with PCP and out patient cardiology in next few days.      Echo 6/14/19     · Moderately decreased left ventricular systolic function. The estimated ejection fraction is 35%  · Mild left ventricular enlargement.  · Eccentric left ventricular hypertrophy.  · Grade II (moderate) left ventricular diastolic dysfunction consistent with pseudonormalization.  · Normal right ventricular systolic function.  · Moderate left atrial enlargement.  · Mild mitral regurgitation.  · Mild tricuspid regurgitation.  · The estimated PA systolic pressure is 36 mm Hg        C 6/15/19  LVEDP: 12mmHg  LVEF: 35%     Dominance: Right  LM: MLI  LAD: diffuse noncritical disease, mid stent patent  LCx: prox 80% (prox stent margin), mid stent patent with dist 90% stent margin, dist 50%  RCA: ost , collateralized L-R via septals     PCI LCx:  Preop ASA/Plavix, intraop heparin  Predil 3.0x20  Stent 3.5x38 Resolute SAM 18 tarah  Excellent result, T3 flow, 0% residual stenosis     Hemostasis:  R Radial band     Impression:  NSTEMI  3V CAD, mod LV dysfxn   RCA  Patent LAD stent  Patent mid LCx stent with severe stent margin stenosis  Successful prox-mid LCx 3.5x38 Resolute SAM  R rad vasband for hemostasis     6/26/19 Feels better since discharge  Denies CP, less SOB  BP elevated controlled by home readings

## 2019-06-30 NOTE — SUBJECTIVE & OBJECTIVE
Interval History: Intubated and sedated. SBT and possible extubation today. Family updated.    Review of Systems   Unable to perform ROS: Intubated     Objective:     Vital Signs (Most Recent):  Temp: 97.6 °F (36.4 °C) (06/30/19 1200)  Pulse: 84 (06/30/19 1400)  Resp: 20 (06/30/19 1400)  BP: 113/64 (06/30/19 1400)  SpO2: 100 % (06/30/19 1400) Vital Signs (24h Range):  Temp:  [96.2 °F (35.7 °C)-98.3 °F (36.8 °C)] 97.6 °F (36.4 °C)  Pulse:  [84-97] 84  Resp:  [8-36] 20  SpO2:  [99 %-100 %] 100 %  BP: (107-150)/(61-84) 113/64     Weight: 60.8 kg (134 lb)  Body mass index is 23 kg/m².    Intake/Output Summary (Last 24 hours) at 6/30/2019 1443  Last data filed at 6/30/2019 1200  Gross per 24 hour   Intake 613.7 ml   Output 1755 ml   Net -1141.3 ml      Physical Exam   Constitutional: She appears well-developed and well-nourished.   HENT:   Head: Normocephalic and atraumatic.   Mouth/Throat: Oropharynx is clear and moist.   Eyes: Pupils are equal, round, and reactive to light. No scleral icterus.   Neck: Normal range of motion. Neck supple.   Cardiovascular: Normal rate, regular rhythm and normal heart sounds.   Pulmonary/Chest: Effort normal and breath sounds normal. She has no wheezes. She has no rales.   Intubated   Abdominal: Soft. Bowel sounds are normal. She exhibits no distension and no mass. There is no guarding.   Genitourinary:   Genitourinary Comments: Boggs   Musculoskeletal: She exhibits no edema, tenderness or deformity.   Neurological:   Intubated and sedated with RASS -2  Skin: Skin is warm and dry. Capillary refill takes less than 2 seconds. No rash noted. No erythema. No pallor.   Nursing note and vitals reviewed.      Significant Labs: All pertinent labs within the past 24 hours have been reviewed.    Significant Imaging: I have reviewed and interpreted all pertinent imaging results/findings within the past 24 hours.

## 2019-06-30 NOTE — PROGRESS NOTES
[facility]  Pulmonology  Progress Note    Patient Name: Susan Howell  MRN: [id number]  Admission Date: 6/29/2019  Hospital Length of Stay: 1 days  Code Status: Full Code  Attending Provider: Ezequiel Gonzalez MD  Primary Care Provider: Linad Mckee MD   Principal Problem: Acute respiratory failure with hypoxia and hypercapnia    Subjective:     Interval History: no acute issue.  Minimal vent requirement.  No need     Objective:     Vital Signs (Most Recent):  Temp: 97.4 °F (36.3 °C) (06/30/19 0715)  Pulse: 86 (06/30/19 1000)  Resp: 19 (06/30/19 1000)  BP: 123/69 (06/30/19 1000)  SpO2: 100 % (06/30/19 1000) Vital Signs (24h Range):  Temp:  [96.2 °F (35.7 °C)-98.3 °F (36.8 °C)] 97.4 °F (36.3 °C)  Pulse:  [86-97] 86  Resp:  [8-36] 19  SpO2:  [99 %-100 %] 100 %  BP: (107-150)/(61-84) 123/69     Weight: 60.8 kg (134 lb)  Body mass index is 23 kg/m².      Intake/Output Summary (Last 24 hours) at 6/30/2019 1052  Last data filed at 6/30/2019 1000  Gross per 24 hour   Intake 753.75 ml   Output 1160 ml   Net -406.25 ml       Physical Exam   Constitutional: She appears well-developed.   HENT:   Head: Normocephalic and atraumatic.   Nose: Nose normal.   Mouth/Throat: Oropharynx is clear and moist.   ett in place.   Eyes: Pupils are equal, round, and reactive to light. EOM are normal.   Neck: Normal range of motion.   Cardiovascular: Normal rate, regular rhythm and normal heart sounds.   Pulmonary/Chest: Effort normal and breath sounds normal. No respiratory distress. She has no wheezes. She has no rales. She exhibits no tenderness.   Abdominal: Soft. Bowel sounds are normal. She exhibits no mass. There is no rebound and no guarding.   Genitourinary:   Genitourinary Comments: Boggs in place   Musculoskeletal: She exhibits no edema or tenderness.   Neurological: No cranial nerve deficit. Coordination normal.   sedated   Skin: No rash noted. No erythema. No pallor.   Psychiatric: She has a normal mood and affect.  Her behavior is normal.       Vents:  Vent Mode: PRVC (06/30/19 0831)  Ventilator Initiated: Yes (06/29/19 0457)  Set Rate: 16 bmp (06/30/19 0831)  Vt Set: 450 mL (06/30/19 0831)  PEEP/CPAP: 5 cmH20 (06/30/19 0831)  Oxygen Concentration (%): 25 (06/30/19 1000)  Peak Airway Pressure: 20.5 cmH2O (06/30/19 0831)  Total Ve: 7.4 mL (06/30/19 0831)  F/VT Ratio<105 (RSBI): (!) 34.71 (06/30/19 0831)    Lines/Drains/Airways     Drain                 NG/OG Tube 06/29/19 0515 orogastric 16 Fr. Left mouth 1 day         Urethral Catheter 06/29/19 0520 Latex 18 Fr. 1 day          Airway                 Airway - Non-Surgical 06/29/19 0454 Endotracheal Tube 1 day          Peripheral Intravenous Line                 Peripheral IV - Single Lumen 06/29/19 0148 20 G Antecubital 1 day         Peripheral IV - Single Lumen 06/29/19 0350 22 G Right Forearm 1 day                Significant Labs:    CBC/Anemia Profile:  Recent Labs   Lab 06/29/19  0215 06/30/19  0403   WBC 12.66 17.66*   HGB 10.2* 11.5*   HCT 32.3* 34.8*    208   MCV 81* 79*   RDW 21.2* 20.9*        Chemistries:  Recent Labs   Lab 06/29/19  0215 06/30/19  0403    135*   K 4.5 4.5    99   CO2 23 19*   BUN 20 30*   CREATININE 0.8 1.1   CALCIUM 8.8 8.5*   ALBUMIN 3.6 3.4*   PROT 7.0 6.5   BILITOT 0.1 0.4   ALKPHOS 67 72   ALT 10 92*   AST 21 576*   MG 2.4  --        ABGs:   Recent Labs   Lab 06/30/19 0427   PH 7.422   PCO2 33.6*   HCO3 21.9*   POCSATURATED 99   BE -2       Echo 6/30/19    · Severely decreased left ventricular systolic function. The estimated ejection fraction is 15%  · Concentric left ventricular hypertrophy.  · Grade II (moderate) left ventricular diastolic dysfunction consistent with pseudonormalization.  · Normal right ventricular systolic function.  · Moderate left atrial enlargement.  · Mild-to-moderate mitral regurgitation.  · Mild tricuspid regurgitation.  · The estimated PA systolic pressure is 38 mm Hg    Significant Imaging:  CXR:  I have reviewed all pertinent results/findings within the past 24 hours and my personal findings are:  no effusion or consolidation    Assessment/Plan:     * Acute respiratory failure with hypoxia and hypercapnia  Intubated for acute hypercapnic respiratory failure.  Suspect volume +/- sedatives.  Diuresing well. Sbt.      Acute encephalopathy  Morphine and ativan while in ed.  Alert of sedation.  Will sbt    Combined Systolic diastolic congestive heart failure  Ef 15% from stemi.  Card is on board.  Currently on bb.  Will defer to card.      NSTEMI (non-ST elevated myocardial infarction)  Plavix, Asa, lovenox.  No plan for lhc today per cards.             Christ Crum MD  Pulmonology  [facility]    Critical Care Time: 35  minutes  Critical secondary to respiratory failure     Critical care was time spent personally by me on the following activities: development of treatment plan with patient or surrogate and bedside caregivers, discussions with consultants, evaluation of patient's response to treatment, examination of patient, ordering and performing treatments and interventions, ordering and review of laboratory studies, ordering and review of radiographic studies, pulse oximetry, re-evaluation of patient's condition.  This critical care time did not overlap with that of any other provider or involve time for any procedures.

## 2019-06-30 NOTE — CONSULTS
[facility]  Cardiology  Consult Note    Patient Name: Susan Howell  MRN: [id number]  Admission Date: 6/29/2019  Hospital Length of Stay: 1 days  Code Status: Full Code   Attending Provider: Ezequiel Gonzalez MD   Consulting Provider: Job Turcios MD  Primary Care Physician: Linda Mckee MD  Principal Problem:Acute respiratory failure with hypoxia and hypercapnia    Patient information was obtained from ER records.     Consults  Subjective:     Chief Complaint:  NSTEMI     HPI:   74 yof with COPD on 2 L HOT, CAD, GERD, Chronic combined systolic diastolic heart failure, HTN, HLD, DMT2, Tobacco abuse, with recent admission for NSTEMI s/p [facility] with PCI to Lcx presenting to ED with non radiating substernal chest discomfort that started after dinner last night (6/28) with associated nasuea. Denied any fever, dyspnea, cough, abdominal pain, back pain, emesis. She described her pain different from her prior NSTEMI chest pain. Nitro by EMS didn't get. Given GI cocktail, morphine and ativan which didn't help either. Patient became more tachypnea despite duonebs, placed for bipap. Given lasix for suspected CHF exacerbation. Subsequently intubated for acute on chronic hypercapnic respiratory failure. Admitted to ICU.     On vent in ICU  EKG NSR inferolateral ST depression  Troponin 0.2 to 20 to >50    Echo prelim - severely depressed EF    Known to me  CAD - Cx stent 2014, remote stent LAD, Occluded RCA, NSTEMI with SAM Cx 6/15/19, EF 35%, HTN, HLD, DM     Admitted 6/13/19  Susan Howell 74 y.o. female with COPD, CAD, HTN, HLD, DM2, and tobacco abuse presents to the hospital with a chief complaint of SOB. She reports yesterday she became SOB, but today the symptoms worsened today while smoking a cigarette. She felt very anxious and she then developed left sided chest tightness that was without radiation and resolved with treatment by EMS. She states they gave her a breathing treatment. She also self  treated the chest pain with nitro/aspirin at home. She states this pain does not feel like a previous MI. She states her chest pain is completely resolved. Her breathing has improved with duo-nebs and steroids improved. She endorses a non-productive cough that is not worsened compared to her baseline. She denies fever, N/V, abdominal pain, syncope, dizziness, orthopnea, leg swelling, and dysuria.      In the ED, BNP 1300, troponin 0.05 at baseline, chest x-ray without coarse interstitial markings, and EKG of NSR with LVH.     Susan Howell 74 y.o. female with history for COPD on 2 L home oxygen, CAD sp MI/stent,  Combined systolic diastolic heart failure, HTN, HLD, DMT2 and current smoker is admitted for chest pain found to have NSTEMI and COPD exacerbation.  In the ED, EKG NSR  BNP 1300. Troponin 0.05 then up to 23 ,cardiogy was consulted,had [facility] with PCI on Lcx,was on ACS medications,  Her symptoms much improved,was chest pain free at DC time,patient has been discharged home with ACS medication and follow up with PCP and out patient cardiology in next few days.      Echo 6/14/19     · Moderately decreased left ventricular systolic function. The estimated ejection fraction is 35%  · Mild left ventricular enlargement.  · Eccentric left ventricular hypertrophy.  · Grade II (moderate) left ventricular diastolic dysfunction consistent with pseudonormalization.  · Normal right ventricular systolic function.  · Moderate left atrial enlargement.  · Mild mitral regurgitation.  · Mild tricuspid regurgitation.  · The estimated PA systolic pressure is 36 mm Hg        [facility] 6/15/19  LVEDP: 12mmHg  LVEF: 35%     Dominance: Right  LM: MLI  LAD: diffuse noncritical disease, mid stent patent  LCx: prox 80% (prox stent margin), mid stent patent with dist 90% stent margin, dist 50%  RCA: ost , collateralized L-R via septals     PCI LCx:  Preop ASA/Plavix, intraop heparin  Predil 3.0x20  Stent 3.5x38 Resolute SAM 18 tarah  Excellent  "result, T3 flow, 0% residual stenosis     Hemostasis:  R Radial band     Impression:  NSTEMI  3V CAD, mod LV dysfxn   RCA  Patent LAD stent  Patent mid LCx stent with severe stent margin stenosis  Successful prox-mid LCx 3.5x38 Resolute SAM  R rad vasband for hemostasis     19 Feels better since discharge  Denies CP, less SOB  BP elevated controlled by home readings      Past Medical History:   Diagnosis Date    Asthma     Cancer 10/10/2014    Bone cancer     CHF (congestive heart failure)     Cigarette smoker     COPD (chronic obstructive pulmonary disease)     Diabetes mellitus     Diabetes mellitus type II     Emphysema of lung     [place] (hard of hearing)     Hypercholesteremia     Hypertension     Myocardial infarct 2013    On home oxygen therapy     Palpitations     Pneumonia 2019    PVD (peripheral vascular disease)     Seizures     Stroke     Unsteady gait     uses a cane & walker       Past Surgical History:   Procedure Laterality Date    APPENDECTOMY       SECTION      CHOLECYSTECTOMY      HYSTERECTOMY      Left heart cath Left 2019    Performed by Fermin Healy MD at [facility] CATH LAB    VASCULAR SURGERY      stent in L leg; unable to place in R leg d/t blockage       Review of patient's allergies indicates:   Allergen Reactions    Morphine      Pt states, "I'm not allergic to morphine they gave me too much at [facility]. Instead of giving me 2mg she gave me 5mg and I was almost dead."     Codeine      Blisters vs sweling (pt unsure which one)    Iodinated contrast- oral and iv dye     Iodine containing multivitamin      Blisters vs swelling (pt unsure which one.)    Lidocaine        No current facility-administered medications on file prior to encounter.      Current Outpatient Medications on File Prior to Encounter   Medication Sig    acetaminophen (TYLENOL) 325 MG tablet Take 2 tablets (650 mg total) by mouth every 4 (four) hours as needed " "for Pain or Temperature greater than (100).    ADVAIR DISKUS 100-50 mcg/dose diskus inhaler Take 1 puff by mouth Twice daily.    alendronate (FOSAMAX) 70 MG tablet Take 70 mg by mouth.    amlodipine (NORVASC) 10 MG tablet Take 1 tablet (10 mg total) by mouth once daily.    ANORO ELLIPTA 62.5-25 mcg/actuation DsDv     aspirin (ECOTRIN) 81 MG EC tablet Take 81 mg by mouth once daily.      atorvastatin (LIPITOR) 80 MG tablet TK 1 T PO D    BD ULTRA-FINE ANISHA PEN NEEDLE 32 gauge x 5/32" Ndle use for insulin up to FOUR TIMES DAILY    BIDIL 20-37.5 mg Tab TK 1 T PO TID    calcium-vitamin D (CALCIUM-VITAMIN D) 500 mg(1,250mg) -200 unit per tablet Take 1 tablet by mouth 2 (two) times daily with meals.      cloNIDine (CATAPRES) 0.1 MG tablet Take 1 tablet (0.1 mg total) by mouth 2 (two) times daily.    clopidogrel (PLAVIX) 75 mg tablet TK 1 T PO D    ergocalciferol (ERGOCALCIFEROL) 50,000 unit Cap Take 50,000 Units by mouth every 7 days.      famotidine (PEPCID) 40 MG tablet     furosemide (LASIX) 40 MG tablet Take 1 tablet (40 mg total) by mouth 2 (two) times daily.    gabapentin (NEURONTIN) 300 MG capsule     hydrocodone-acetaminophen 5-325mg (NORCO) 5-325 mg per tablet Take 1 tablet by mouth 3 (three) times daily as needed.     imatinib (GLEEVEC) 400 MG Tab Take 1 tablet (400 mg total) by mouth once daily.    insulin glargine (LANTUS) 100 unit/mL injection Inject 18 Units into the skin every evening.    isosorbide mononitrate (IMDUR) 30 MG 24 hr tablet Take 1 tablet (30 mg total) by mouth once daily.    lisinopril (PRINIVIL,ZESTRIL) 40 MG tablet Take 1 tablet (40 mg total) by mouth once daily.    metoprolol tartrate (LOPRESSOR) 25 MG tablet TK 1 T PO BID    nicotine (NICODERM CQ) 21 mg/24 hr Place 1 patch onto the skin once daily.    nitroGLYCERIN (NITROSTAT) 0.4 MG SL tablet Place 1 tablet (0.4 mg total) under the tongue every 5 (five) minutes as needed for Chest pain.    prednisoLONE acetate " (PRED FORTE) 1 % DrpS     ranitidine (ZANTAC) 300 MG tablet TK 1 T PO QHS    ranolazine (RANEXA) 500 MG Tb12 Take 500 mg by mouth 2 (two) times daily.    tiotropium (SPIRIVA) 18 mcg inhalation capsule Inhale 18 mcg into the lungs once daily.      VENTOLIN HFA 90 mcg/actuation HFAA Inhale 2 puffs into the lungs every 4 to 6 hours as needed.     Family History     Problem Relation (Age of Onset)    Asthma Mother    Breast cancer Sister    Cancer Father    Diabetes Mother        Tobacco Use    Smoking status: Current Every Day Smoker     Packs/day: 0.50     Years: 56.00     Pack years: 28.00     Types: Cigarettes    Smokeless tobacco: Current User   Substance and Sexual Activity    Alcohol use: No    Drug use: No    Sexual activity: Never     Review of Systems   Unable to perform ROS: intubated     Objective:     Vital Signs (Most Recent):  Temp: 97.4 °F (36.3 °C) (06/30/19 0715)  Pulse: 91 (06/30/19 0900)  Resp: 16 (06/30/19 0900)  BP: 119/61 (06/30/19 0900)  SpO2: 100 % (06/30/19 0900) Vital Signs (24h Range):  Temp:  [96.2 °F (35.7 °C)-98.3 °F (36.8 °C)] 97.4 °F (36.3 °C)  Pulse:  [82-97] 91  Resp:  [0-36] 16  SpO2:  [99 %-100 %] 100 %  BP: (107-150)/(61-84) 119/61     Weight: 60.9 kg (134 lb 4.2 oz)  Body mass index is 23.05 kg/m².    SpO2: 100 %  O2 Device (Oxygen Therapy): ventilator      Intake/Output Summary (Last 24 hours) at 6/30/2019 0952  Last data filed at 6/30/2019 0600  Gross per 24 hour   Intake 617.35 ml   Output 1960 ml   Net -1342.65 ml       Lines/Drains/Airways     Drain                 NG/OG Tube 06/29/19 0515 orogastric 16 Fr. Left mouth 1 day         Urethral Catheter 06/29/19 0520 Latex 18 Fr. 1 day          Airway                 Airway - Non-Surgical 06/29/19 0454 Endotracheal Tube 1 day          Peripheral Intravenous Line                 Peripheral IV - Single Lumen 06/29/19 0148 20 G Antecubital 1 day         Peripheral IV - Single Lumen 06/29/19 0350 22 G Right Forearm 1 day                 Physical Exam   Constitutional: She appears well-developed and well-nourished.   HENT:   Head: Normocephalic and atraumatic.   Eyes: Pupils are equal, round, and reactive to light. Conjunctivae are normal.   Neck: Normal range of motion. Neck supple.   Cardiovascular: Normal rate, normal heart sounds and intact distal pulses.   Pulmonary/Chest: Effort normal. She has decreased breath sounds.   Abdominal: Soft. Bowel sounds are normal.   Musculoskeletal: Normal range of motion.   Skin: Skin is warm and dry.       Significant Labs: All pertinent lab results from the last 24 hours have been reviewed.    Significant Imaging: Echocardiogram:   2D echo with color flow doppler:   Results for orders placed or performed during the hospital encounter of 03/03/16   2D echo with color flow doppler   Result Value Ref Range    QEF 40 (A) 55 - 65    Mitral Valve Regurgitation TRIVIAL     Diastolic Dysfunction Yes (A)     Est. PA Systolic Pressure 30.47     Pericardial Effusion NONE     Mitral Valve Mobility NORMAL     Tricuspid Valve Regurgitation TRIVIAL     Narrative    TEST DESCRIPTION   Technical Quality: This is a technically adequate study.     Aorta: The aortic root is normal in size, measuring 2.0 cm at sinotubular junction and 2.5 cm at Sinuses of Valsalva. The proximal ascending aorta is normal in size, measuring 1.8 cm across.     Left Atrium: The left atrial volume index is normal, measuring 22.70 cc/m2.     Left Ventricle: The left ventricle is normal in size, with an end-diastolic diameter of 5.0 cm, and an end-systolic diameter of 3.7 cm. Wall thickness is mildly increased, with the septum measuring 1.2 cm and the posterior wall measuring 1.1 cm across.   Relative wall thickness was increased at 0.44, and the LV mass index was increased at 149.1 g/m2 consistent with mild concentric left ventricular hypertrophy. Global left ventricular systolic function appears mildly to moderately depressed. Visually    estimated ejection fraction is 40-45%. The LV Doppler derived stroke volume equals 63.0 ccs.   The E/e'(lat) is 12, consistent with significant diastolic dysfunction.     Right Atrium: The right atrium is normal in size, measuring 4.1 cm in length and 2.7 cm in width in the apical view.     Right Ventricle: The right ventricle is normal in size measuring 2.9 cm at the base in the apical right ventricle-focused view. Global right ventricular systolic function appears normal. Tricuspid annular plane systolic excursion (TAPSE) is 1.6 cm. The   estimated PA systolic pressure is 30 mmHg.     Aortic Valve:  The aortic valve is mildly sclerotic with normal leaflet mobility. The aortic valve is tri-leaflet in structure.     Mitral Valve:  The mitral valve is normal in structure with normal leaflet mobility. There is trivial mitral regurgitation.     Tricuspid Valve:  The tricuspid valve is normal in structure with normal leaflet mobility. There is trivial tricuspid regurgitation.     Pulmonary Valve:  The pulmonic valve is not well seen.     Pericardium: There is no evidence of pericardial effusion.      IVC: IVC is enlarged but collapses > 50% with a sniff, suggesting intermediate right atrial pressure of 8 mmHg.     Intracavitary: There is no evidence of intracavity mass, thrombi, or vegetation.         CONCLUSIONS     1 - Mildly to moderately depressed left ventricular systolic function (EF 40-45%).  Mild global hypokinesis..     2 - Concentric hypertrophy.     3 - Left ventricular diastolic dysfunction.     4 - Trivial mitral regurgitation.     5 - Trivial tricuspid regurgitation.     6 - The estimated PA systolic pressure is 30 mmHg.         This document has been electronically    SIGNED BY: Fermin Healy MD On: 03/04/2016 15:03     Assessment and Plan:     * Acute respiratory failure with hypoxia and hypercapnia  Combination of CHF and COPD. Vent management per pulmonary    Combined Systolic diastolic  congestive heart failure  Baseline EF 35%. Repeat echo prelim with severely depressed EF. Diuresis and afterload reduction as tolerated    NSTEMI (non-ST elevated myocardial infarction)  Troponin > 50. Suspect Cx stent thrombosis. Discussed with Dr Le last PM - will attempt to stabilize respiratory status prior to repeating LHC, probable Monday. Plavix reloaded. Discussed with Dr Healy. Possible repeat LHC in AM if respiratory status improved. Overall poor prognosis    Hyperlipidemia  On statin    History of CVA (cerebrovascular accident)       Type 2 diabetes mellitus, uncontrolled  Per primary    CAD s/p PCI  As above        VTE Risk Mitigation (From admission, onward)        Ordered     enoxaparin injection 60 mg  Every 12 hours (non-standard times)      06/30/19 0954        40 minutes spent with patient in ICU    Thank you for your consult. I will follow-up with patient. Please contact us if you have any additional questions.    Job Turcios MD  Cardiology   [facility]

## 2019-06-30 NOTE — ASSESSMENT & PLAN NOTE
Presenting with atypical non radiating chest pain. Initial trop 0.2 ---> 20 --> 50. EKG NSR inferolateral ST depression. Continues to smoker tobacco. Daughter states patient is complaint with her home meds, but not completely sure. Last echo with EF 35% + moderate DD. Recently had a angiogram 6/14 with stent to Lcx. ?Stent thrombosis.  - Cardiology consulted  - Lovenox BID therapeutic dosing  - BB, statin, Plavix and asa 81. Plavix reloaded.  - TTE with 15% EF from MI  - Lasix IV 40 mg bid for now  - Tentative angiogram once stable as per cards  - Overall poor prognosis

## 2019-06-30 NOTE — SUBJECTIVE & OBJECTIVE
"Past Medical History:   Diagnosis Date    Asthma     Cancer 10/10/2014    Bone cancer     CHF (congestive heart failure)     Cigarette smoker     COPD (chronic obstructive pulmonary disease)     Diabetes mellitus     Diabetes mellitus type II     Emphysema of lung     [place] (hard of hearing)     Hypercholesteremia     Hypertension     Myocardial infarct 2013    On home oxygen therapy     Palpitations     Pneumonia 2019    PVD (peripheral vascular disease)     Seizures     Stroke     Unsteady gait     uses a cane & walker       Past Surgical History:   Procedure Laterality Date    APPENDECTOMY       SECTION      CHOLECYSTECTOMY      HYSTERECTOMY      Left heart cath Left 2019    Performed by Fermin Healy MD at [facility] CATH LAB    VASCULAR SURGERY      stent in L leg; unable to place in R leg d/t blockage       Review of patient's allergies indicates:   Allergen Reactions    Morphine      Pt states, "I'm not allergic to morphine they gave me too much at [facility]. Instead of giving me 2mg she gave me 5mg and I was almost dead."     Codeine      Blisters vs sweling (pt unsure which one)    Iodinated contrast- oral and iv dye     Iodine containing multivitamin      Blisters vs swelling (pt unsure which one.)    Lidocaine        No current facility-administered medications on file prior to encounter.      Current Outpatient Medications on File Prior to Encounter   Medication Sig    acetaminophen (TYLENOL) 325 MG tablet Take 2 tablets (650 mg total) by mouth every 4 (four) hours as needed for Pain or Temperature greater than (100).    ADVAIR DISKUS 100-50 mcg/dose diskus inhaler Take 1 puff by mouth Twice daily.    alendronate (FOSAMAX) 70 MG tablet Take 70 mg by mouth.    amlodipine (NORVASC) 10 MG tablet Take 1 tablet (10 mg total) by mouth once daily.    ANORO ELLIPTA 62.5-25 mcg/actuation DsDv     aspirin (ECOTRIN) 81 MG EC tablet Take 81 mg by mouth once " "daily.      atorvastatin (LIPITOR) 80 MG tablet TK 1 T PO D    BD ULTRA-FINE ANISHA PEN NEEDLE 32 gauge x 5/32" Ndle use for insulin up to FOUR TIMES DAILY    BIDIL 20-37.5 mg Tab TK 1 T PO TID    calcium-vitamin D (CALCIUM-VITAMIN D) 500 mg(1,250mg) -200 unit per tablet Take 1 tablet by mouth 2 (two) times daily with meals.      cloNIDine (CATAPRES) 0.1 MG tablet Take 1 tablet (0.1 mg total) by mouth 2 (two) times daily.    clopidogrel (PLAVIX) 75 mg tablet TK 1 T PO D    ergocalciferol (ERGOCALCIFEROL) 50,000 unit Cap Take 50,000 Units by mouth every 7 days.      famotidine (PEPCID) 40 MG tablet     furosemide (LASIX) 40 MG tablet Take 1 tablet (40 mg total) by mouth 2 (two) times daily.    gabapentin (NEURONTIN) 300 MG capsule     hydrocodone-acetaminophen 5-325mg (NORCO) 5-325 mg per tablet Take 1 tablet by mouth 3 (three) times daily as needed.     imatinib (GLEEVEC) 400 MG Tab Take 1 tablet (400 mg total) by mouth once daily.    insulin glargine (LANTUS) 100 unit/mL injection Inject 18 Units into the skin every evening.    isosorbide mononitrate (IMDUR) 30 MG 24 hr tablet Take 1 tablet (30 mg total) by mouth once daily.    lisinopril (PRINIVIL,ZESTRIL) 40 MG tablet Take 1 tablet (40 mg total) by mouth once daily.    metoprolol tartrate (LOPRESSOR) 25 MG tablet TK 1 T PO BID    nicotine (NICODERM CQ) 21 mg/24 hr Place 1 patch onto the skin once daily.    nitroGLYCERIN (NITROSTAT) 0.4 MG SL tablet Place 1 tablet (0.4 mg total) under the tongue every 5 (five) minutes as needed for Chest pain.    prednisoLONE acetate (PRED FORTE) 1 % DrpS     ranitidine (ZANTAC) 300 MG tablet TK 1 T PO QHS    ranolazine (RANEXA) 500 MG Tb12 Take 500 mg by mouth 2 (two) times daily.    tiotropium (SPIRIVA) 18 mcg inhalation capsule Inhale 18 mcg into the lungs once daily.      VENTOLIN HFA 90 mcg/actuation HFAA Inhale 2 puffs into the lungs every 4 to 6 hours as needed.     Family History     Problem Relation " (Age of Onset)    Asthma Mother    Breast cancer Sister    Cancer Father    Diabetes Mother        Tobacco Use    Smoking status: Current Every Day Smoker     Packs/day: 0.50     Years: 56.00     Pack years: 28.00     Types: Cigarettes    Smokeless tobacco: Current User   Substance and Sexual Activity    Alcohol use: No    Drug use: No    Sexual activity: Never     Review of Systems   Unable to perform ROS: intubated     Objective:     Vital Signs (Most Recent):  Temp: 97.4 °F (36.3 °C) (06/30/19 0715)  Pulse: 91 (06/30/19 0900)  Resp: 16 (06/30/19 0900)  BP: 119/61 (06/30/19 0900)  SpO2: 100 % (06/30/19 0900) Vital Signs (24h Range):  Temp:  [96.2 °F (35.7 °C)-98.3 °F (36.8 °C)] 97.4 °F (36.3 °C)  Pulse:  [82-97] 91  Resp:  [0-36] 16  SpO2:  [99 %-100 %] 100 %  BP: (107-150)/(61-84) 119/61     Weight: 60.9 kg (134 lb 4.2 oz)  Body mass index is 23.05 kg/m².    SpO2: 100 %  O2 Device (Oxygen Therapy): ventilator      Intake/Output Summary (Last 24 hours) at 6/30/2019 0952  Last data filed at 6/30/2019 0600  Gross per 24 hour   Intake 617.35 ml   Output 1960 ml   Net -1342.65 ml       Lines/Drains/Airways     Drain                 NG/OG Tube 06/29/19 0515 orogastric 16 Fr. Left mouth 1 day         Urethral Catheter 06/29/19 0520 Latex 18 Fr. 1 day          Airway                 Airway - Non-Surgical 06/29/19 0454 Endotracheal Tube 1 day          Peripheral Intravenous Line                 Peripheral IV - Single Lumen 06/29/19 0148 20 G Antecubital 1 day         Peripheral IV - Single Lumen 06/29/19 0350 22 G Right Forearm 1 day                Physical Exam   Constitutional: She appears well-developed and well-nourished.   HENT:   Head: Normocephalic and atraumatic.   Eyes: Pupils are equal, round, and reactive to light. Conjunctivae are normal.   Neck: Normal range of motion. Neck supple.   Cardiovascular: Normal rate, normal heart sounds and intact distal pulses.   Pulmonary/Chest: Effort normal. She has  decreased breath sounds.   Abdominal: Soft. Bowel sounds are normal.   Musculoskeletal: Normal range of motion.   Skin: Skin is warm and dry.       Significant Labs: All pertinent lab results from the last 24 hours have been reviewed.    Significant Imaging: Echocardiogram:   2D echo with color flow doppler:   Results for orders placed or performed during the hospital encounter of 03/03/16   2D echo with color flow doppler   Result Value Ref Range    QEF 40 (A) 55 - 65    Mitral Valve Regurgitation TRIVIAL     Diastolic Dysfunction Yes (A)     Est. PA Systolic Pressure 30.47     Pericardial Effusion NONE     Mitral Valve Mobility NORMAL     Tricuspid Valve Regurgitation TRIVIAL     Narrative    TEST DESCRIPTION   Technical Quality: This is a technically adequate study.     Aorta: The aortic root is normal in size, measuring 2.0 cm at sinotubular junction and 2.5 cm at Sinuses of Valsalva. The proximal ascending aorta is normal in size, measuring 1.8 cm across.     Left Atrium: The left atrial volume index is normal, measuring 22.70 cc/m2.     Left Ventricle: The left ventricle is normal in size, with an end-diastolic diameter of 5.0 cm, and an end-systolic diameter of 3.7 cm. Wall thickness is mildly increased, with the septum measuring 1.2 cm and the posterior wall measuring 1.1 cm across.   Relative wall thickness was increased at 0.44, and the LV mass index was increased at 149.1 g/m2 consistent with mild concentric left ventricular hypertrophy. Global left ventricular systolic function appears mildly to moderately depressed. Visually   estimated ejection fraction is 40-45%. The LV Doppler derived stroke volume equals 63.0 ccs.   The E/e'(lat) is 12, consistent with significant diastolic dysfunction.     Right Atrium: The right atrium is normal in size, measuring 4.1 cm in length and 2.7 cm in width in the apical view.     Right Ventricle: The right ventricle is normal in size measuring 2.9 cm at the base in the  apical right ventricle-focused view. Global right ventricular systolic function appears normal. Tricuspid annular plane systolic excursion (TAPSE) is 1.6 cm. The   estimated PA systolic pressure is 30 mmHg.     Aortic Valve:  The aortic valve is mildly sclerotic with normal leaflet mobility. The aortic valve is tri-leaflet in structure.     Mitral Valve:  The mitral valve is normal in structure with normal leaflet mobility. There is trivial mitral regurgitation.     Tricuspid Valve:  The tricuspid valve is normal in structure with normal leaflet mobility. There is trivial tricuspid regurgitation.     Pulmonary Valve:  The pulmonic valve is not well seen.     Pericardium: There is no evidence of pericardial effusion.      IVC: IVC is enlarged but collapses > 50% with a sniff, suggesting intermediate right atrial pressure of 8 mmHg.     Intracavitary: There is no evidence of intracavity mass, thrombi, or vegetation.         CONCLUSIONS     1 - Mildly to moderately depressed left ventricular systolic function (EF 40-45%).  Mild global hypokinesis..     2 - Concentric hypertrophy.     3 - Left ventricular diastolic dysfunction.     4 - Trivial mitral regurgitation.     5 - Trivial tricuspid regurgitation.     6 - The estimated PA systolic pressure is 30 mmHg.         This document has been electronically    SIGNED BY: Fermin Healy MD On: 03/04/2016 15:03

## 2019-06-30 NOTE — PROGRESS NOTES
[facility] Hospital Medicine  Progress Note    Patient Name: Susan Howell  MRN: [id number]  Patient Class: IP- Inpatient   Admission Date: 6/29/2019  Length of Stay: 1 days  Attending Physician: Ezequiel Gonzalez MD  Primary Care Provider: Linda Mckee MD      Subjective:     Principal Problem:Acute respiratory failure with hypoxia and hypercapnia    HPI:  Patient is intubated and sedated during my assessment. Hx taken from chart review.    74 yof with COPD on 2 L HOT, CAD, GERD, Chronic combined systolic diastolic heart failure, HTN, HLD, DMT2, Tobacco abuse, with recent admission for NSTEMI s/p [facility] with PCI to Lcx presenting to ED with non radiating substernal chest discomfort that started after dinner last night (6/28) with associated nasuea. Denied any fever, dyspnea, cough, abdominal pain, back pain, emesis. She described her pain different from her prior NSTEMI chest pain. Nitro by EMS didn't get. Given GI cocktail, morphine and ativan which didn't help either. Patient became more tachypnea despite duonebs, placed for bipap. Given lasix for suspected CHF exacerbation. Subsequently intubated for acute on chronic hypercapnic respiratory failure. Admitted to ICU.    Overview/Hospital Course:  Presented to ED with atypical non radiating substernal CP after dinner. Stated to be different from her prior MI 2 weeks ago. Initially saturating well on RA (as per chart review). After receiving morphine, fluids, and lorazepam x 2 patient became more hypoxic and hypercarbic, despite bipap. Intubated and sedated on propofol. Admitted to ICU. Trop initially 0.2. EKG NSR inferolateral ST depression. Cardiology consulted. Repeat Trop 0.2 --> 20 --> >50. Last echo with EF 35% + moderate DD. Recently had a angiogram 6/14 with stent to Lcx. ?Stent thrombosis. Started Lovenox BID therapeutic dosing while continuing BB, statin, Plavix and asa 81. Repeat TTE ordered. Lasix IV 40 mg bid for now. Tentative angiogram  as per cardiology.    Interval History: Intubated and sedated. SBT and possible extubation today. Family updated.    Review of Systems   Unable to perform ROS: Intubated     Objective:     Vital Signs (Most Recent):  Temp: 97.6 °F (36.4 °C) (06/30/19 1200)  Pulse: 84 (06/30/19 1400)  Resp: 20 (06/30/19 1400)  BP: 113/64 (06/30/19 1400)  SpO2: 100 % (06/30/19 1400) Vital Signs (24h Range):  Temp:  [96.2 °F (35.7 °C)-98.3 °F (36.8 °C)] 97.6 °F (36.4 °C)  Pulse:  [84-97] 84  Resp:  [8-36] 20  SpO2:  [99 %-100 %] 100 %  BP: (107-150)/(61-84) 113/64     Weight: 60.8 kg (134 lb)  Body mass index is 23 kg/m².    Intake/Output Summary (Last 24 hours) at 6/30/2019 1443  Last data filed at 6/30/2019 1200  Gross per 24 hour   Intake 613.7 ml   Output 1755 ml   Net -1141.3 ml      Physical Exam   Constitutional: She appears well-developed and well-nourished.   HENT:   Head: Normocephalic and atraumatic.   Mouth/Throat: Oropharynx is clear and moist.   Eyes: Pupils are equal, round, and reactive to light. No scleral icterus.   Neck: Normal range of motion. Neck supple.   Cardiovascular: Normal rate, regular rhythm and normal heart sounds.   Pulmonary/Chest: Effort normal and breath sounds normal. She has no wheezes. She has no rales.   Intubated   Abdominal: Soft. Bowel sounds are normal. She exhibits no distension and no mass. There is no guarding.   Genitourinary:   Genitourinary Comments: Boggs   Musculoskeletal: She exhibits no edema, tenderness or deformity.   Neurological:   Intubated and sedated with RASS -2  Skin: Skin is warm and dry. Capillary refill takes less than 2 seconds. No rash noted. No erythema. No pallor.   Nursing note and vitals reviewed.      Significant Labs: All pertinent labs within the past 24 hours have been reviewed.    Significant Imaging: I have reviewed and interpreted all pertinent imaging results/findings within the past 24 hours.      Assessment/Plan:      * Acute respiratory failure with  hypoxia and hypercapnia  Presented to ED with substernal CP after dinner. Stated to be different from her prior MI 2 weeks ago. Initially saturating well on RA (as per chart review). After receiving morphine, fluids, and lorazepam x 2 patient became more hypoxic and hypercarbic. Suspected iatrogenic + from her other actives medical issues this time.  - Currently she is comfortable on 40% FIO2 wo any pressor support  - Pulmonary consulted for vent mgmt, sbt and possible extubation today  - Duonebs  - Tobacco cessation  - IV lasix, diuresing well    NSTEMI (non-ST elevated myocardial infarction)  Presenting with atypical non radiating chest pain. Initial trop 0.2 ---> 20 --> 50. EKG NSR inferolateral ST depression. Continues to smoker tobacco. Daughter states patient is complaint with her home meds, but not completely sure. Last echo with EF 35% + moderate DD. Recently had a angiogram 6/14 with stent to Lcx. ?Stent thrombosis.  - Cardiology consulted  - Lovenox BID therapeutic dosing  - BB, statin, Plavix and asa 81. Plavix reloaded.  - TTE with 15% EF from MI  - Lasix IV 40 mg bid for now  - Tentative angiogram once stable as per cards  - Overall poor prognosis    Acute on chronic combined systolic and diastolic congestive heart failure  As above. From acute MI.    Acute encephalopathy  Iatrogenic meds --> hypoxia, hypercarbia induced    Acid reflux  Noted.      Anemia of chronic disease  Noted. Stable.      Hyperlipidemia  High intensity statin      History of CVA (cerebrovascular accident)  Noted. Continue ASA, plavix, statin      Type 2 diabetes mellitus, uncontrolled  Hold all oral and home meds  Start levemir 5 units daily --> changed to 10 units BID  SSI and accucheck  Titrate insulin as needed    CAD s/p PCI  As above      Tobacco abuse  Still actively smoking as per daughter.      PVD (peripheral vascular disease)  Noted. ASA statin plavix       COPD (chronic obstructive pulmonary disease)  Noted.  Compa.    Hypertension  Home all home antihypertensive meds except for metoprolol       VTE Risk Mitigation (From admission, onward)        Ordered     enoxaparin injection 60 mg  Every 12 hours (non-standard times)      06/30/19 2658          Critical care time spent on the evaluation and treatment of severe organ dysfunction, review of pertinent labs and imaging studies, discussions with consulting providers and discussions with patient/family: 60 minutes.      Ezequiel Gonzalez MD  Department of Hospital Medicine   [facility]

## 2019-06-30 NOTE — CARE UPDATE
Pt is on vent settings PRVC/16/450/+5/30%  Ambu bag is at the HOB and all alarms are set and working properly. Will continue to monitor.

## 2019-06-30 NOTE — ASSESSMENT & PLAN NOTE
Presented to ED with substernal CP after dinner. Stated to be different from her prior MI 2 weeks ago. Initially saturating well on RA (as per chart review). After receiving morphine, fluids, and lorazepam x 2 patient became more hypoxic and hypercarbic. Suspected iatrogenic + from her other actives medical issues this time.  - Currently she is comfortable on 40% FIO2 wo any pressor support  - Pulmonary consulted for vent mgmt, sbt and possible extubation today  - Duonebs  - Tobacco cessation  - IV lasix, diuresing well

## 2019-06-30 NOTE — PLAN OF CARE
Problem: Adult Inpatient Plan of Care  Goal: Plan of Care Review  Outcome: Ongoing (interventions implemented as appropriate)  1800 VSS, pt usually oriented x3, but does get confused and forgetful at times and needs redirection, [place], tolerating diet well, lacks appetite, blood glucose moderately well controlled during shift, SpO2 100% on 2L NC, voiding adequate clear yellow urine to Boggs catheter to gravity, no BM this shift, bed in low locked position, call light within reach, able to make needs known; no needs at this time.

## 2019-06-30 NOTE — SUBJECTIVE & OBJECTIVE
Interval History: no acute issue.  Minimal vent requirement.  No need     Objective:     Vital Signs (Most Recent):  Temp: 97.4 °F (36.3 °C) (06/30/19 0715)  Pulse: 86 (06/30/19 1000)  Resp: 19 (06/30/19 1000)  BP: 123/69 (06/30/19 1000)  SpO2: 100 % (06/30/19 1000) Vital Signs (24h Range):  Temp:  [96.2 °F (35.7 °C)-98.3 °F (36.8 °C)] 97.4 °F (36.3 °C)  Pulse:  [86-97] 86  Resp:  [8-36] 19  SpO2:  [99 %-100 %] 100 %  BP: (107-150)/(61-84) 123/69     Weight: 60.8 kg (134 lb)  Body mass index is 23 kg/m².      Intake/Output Summary (Last 24 hours) at 6/30/2019 1052  Last data filed at 6/30/2019 1000  Gross per 24 hour   Intake 753.75 ml   Output 1160 ml   Net -406.25 ml       Physical Exam   Constitutional: She appears well-developed.   HENT:   Head: Normocephalic and atraumatic.   Nose: Nose normal.   Mouth/Throat: Oropharynx is clear and moist.   ett in place.   Eyes: Pupils are equal, round, and reactive to light. EOM are normal.   Neck: Normal range of motion.   Cardiovascular: Normal rate, regular rhythm and normal heart sounds.   Pulmonary/Chest: Effort normal and breath sounds normal. No respiratory distress. She has no wheezes. She has no rales. She exhibits no tenderness.   Abdominal: Soft. Bowel sounds are normal. She exhibits no mass. There is no rebound and no guarding.   Genitourinary:   Genitourinary Comments: Boggs in place   Musculoskeletal: She exhibits no edema or tenderness.   Neurological: No cranial nerve deficit. Coordination normal.   sedated   Skin: No rash noted. No erythema. No pallor.   Psychiatric: She has a normal mood and affect. Her behavior is normal.       Vents:  Vent Mode: PRVC (06/30/19 0831)  Ventilator Initiated: Yes (06/29/19 0457)  Set Rate: 16 bmp (06/30/19 0831)  Vt Set: 450 mL (06/30/19 0831)  PEEP/CPAP: 5 cmH20 (06/30/19 0831)  Oxygen Concentration (%): 25 (06/30/19 1000)  Peak Airway Pressure: 20.5 cmH2O (06/30/19 0831)  Total Ve: 7.4 mL (06/30/19 0831)  F/VT Ratio<105  (RSBI): (!) 34.71 (06/30/19 0831)    Lines/Drains/Airways     Drain                 NG/OG Tube 06/29/19 0515 orogastric 16 Fr. Left mouth 1 day         Urethral Catheter 06/29/19 0520 Latex 18 Fr. 1 day          Airway                 Airway - Non-Surgical 06/29/19 0454 Endotracheal Tube 1 day          Peripheral Intravenous Line                 Peripheral IV - Single Lumen 06/29/19 0148 20 G Antecubital 1 day         Peripheral IV - Single Lumen 06/29/19 0350 22 G Right Forearm 1 day                Significant Labs:    CBC/Anemia Profile:  Recent Labs   Lab 06/29/19  0215 06/30/19  0403   WBC 12.66 17.66*   HGB 10.2* 11.5*   HCT 32.3* 34.8*    208   MCV 81* 79*   RDW 21.2* 20.9*        Chemistries:  Recent Labs   Lab 06/29/19 0215 06/30/19  0403    135*   K 4.5 4.5    99   CO2 23 19*   BUN 20 30*   CREATININE 0.8 1.1   CALCIUM 8.8 8.5*   ALBUMIN 3.6 3.4*   PROT 7.0 6.5   BILITOT 0.1 0.4   ALKPHOS 67 72   ALT 10 92*   AST 21 576*   MG 2.4  --        ABGs:   Recent Labs   Lab 06/30/19  0427   PH 7.422   PCO2 33.6*   HCO3 21.9*   POCSATURATED 99   BE -2       Echo 6/30/19    · Severely decreased left ventricular systolic function. The estimated ejection fraction is 15%  · Concentric left ventricular hypertrophy.  · Grade II (moderate) left ventricular diastolic dysfunction consistent with pseudonormalization.  · Normal right ventricular systolic function.  · Moderate left atrial enlargement.  · Mild-to-moderate mitral regurgitation.  · Mild tricuspid regurgitation.  · The estimated PA systolic pressure is 38 mm Hg    Significant Imaging:  CXR: I have reviewed all pertinent results/findings within the past 24 hours and my personal findings are:  no effusion or consolidation

## 2019-06-30 NOTE — ASSESSMENT & PLAN NOTE
Baseline EF 35%. Repeat echo prelim with severely depressed EF. Diuresis and afterload reduction as tolerated

## 2019-07-01 NOTE — CARE UPDATE
[facility]  ICU Multidisciplinary Bedside Rounds     UPDATE     Date: 7/1/2019      Plan of care reviewed with the following, Nurse, Charge Nurse, Physician, Pulm CC, Resp. Therapist, Infection Prevention, Dietician and .       Needs/ Goals for the day: Monitor UOP closely, dark and cloudy. Patient currently in cath lab. Titrate insulin      Level of Care: Critical Care

## 2019-07-01 NOTE — PROCEDURES
"Susan Howell is a 74 y.o. female patient.    Temp: 98.6 °F (37 °C) (07/01/19 0730)  Pulse: 90 (07/01/19 0800)  Resp: (!) 26 (07/01/19 0800)  BP: (!) 117/58 (07/01/19 0800)  SpO2: 100 % (07/01/19 0800)  Weight: 60.8 kg (134 lb) (06/30/19 0900)  Height: 5' 4" (162.6 cm) (06/30/19 0900)       Procedures     Pre-sedation Assessment:    1. ASA Score: ASA 3 - Patient with moderate systemic disease with functional limitations  2. Mallampati Class: II (hard and soft palate, upper portion of tonsils anduvula visible)  3. Patient or family history of any reaction to anesthesia or sedation: No  4. Plan for Sedation: Moderate  5. H&P within 30 days of the procedure and updated within 24 hrs of admission or registration: Yes    Job Turcios  7/1/2019  "

## 2019-07-01 NOTE — SUBJECTIVE & OBJECTIVE
Interval History: Extubated yesterday, tolerated well. Lactate elevated yesterday, improved now. Mild hypotension overnight while asleep, asymptomatic, improved now. Awake and alert this AM. Hypoglycemia noted AM, oral glucose tabs given. [facility] this AM.      Review of Systems   Constitutional: Negative for chills, diaphoresis and fatigue.   HENT: Negative for congestion, mouth sores, nosebleeds, sinus pressure, sore throat and trouble swallowing.    Eyes: Negative for photophobia and visual disturbance.   Respiratory: Negative for cough, chest tightness, shortness of breath and wheezing.    Cardiovascular: Negative for chest pain, palpitations and leg swelling.   Gastrointestinal: Negative for abdominal distention, abdominal pain, anal bleeding, blood in stool, constipation, diarrhea, nausea, rectal pain and vomiting.   Endocrine: Negative for polydipsia, polyphagia and polyuria.   Genitourinary: Negative.  Negative for dysuria, frequency, hematuria and urgency.   Musculoskeletal: Negative for arthralgias, back pain, gait problem, joint swelling, myalgias and neck pain.   Skin: Negative for color change, pallor, rash and wound.   Neurological: Negative for dizziness, seizures, syncope, weakness and headaches.   Psychiatric/Behavioral: Negative for agitation, behavioral problems, confusion and hallucinations.     Objective:     Vital Signs (Most Recent):  Temp: 98.6 °F (37 °C) (07/01/19 0730)  Pulse: 90 (07/01/19 0800)  Resp: (!) 26 (07/01/19 0800)  BP: (!) 117/58 (07/01/19 0800)  SpO2: 100 % (07/01/19 0800) Vital Signs (24h Range):  Temp:  [97.6 °F (36.4 °C)-98.6 °F (37 °C)] 98.6 °F (37 °C)  Pulse:  [] 90  Resp:  [9-52] 26  SpO2:  [63 %-100 %] 100 %  BP: ()/(49-72) 117/58     Weight: 60.8 kg (134 lb)  Body mass index is 23 kg/m².    Intake/Output Summary (Last 24 hours) at 7/1/2019 0907  Last data filed at 7/1/2019 0730  Gross per 24 hour   Intake 348.15 ml   Output 1050 ml   Net -701.85 ml      Physical  Exam   Constitutional: She appears well-developed and well-nourished.   HENT:   Head: Normocephalic and atraumatic.   Mouth/Throat: Oropharynx is clear and moist.   Eyes: Pupils are equal, round, and reactive to light. No scleral icterus.   Neck: Normal range of motion. Neck supple.   Cardiovascular: Normal rate, regular rhythm and normal heart sounds.   Pulmonary/Chest: Effort normal and breath sounds normal. She has no wheezes. She has no rales.   Abdominal: Soft. Bowel sounds are normal. She exhibits no distension and no mass. There is no guarding.   Genitourinary:   Genitourinary Comments: Boggs   Musculoskeletal: She exhibits no edema, tenderness or deformity.   Neurological: Alert and Oriented   Skin: Skin is warm and dry. Capillary refill takes less than 2 seconds. No rash noted. No erythema. No pallor.   Nursing note and vitals reviewed.      Significant Labs: All pertinent labs within the past 24 hours have been reviewed.    Significant Imaging: I have reviewed and interpreted all pertinent imaging results/findings within the past 24 hours.

## 2019-07-01 NOTE — ASSESSMENT & PLAN NOTE
Presenting with atypical non radiating chest pain. Initial trop 0.2 ---> 20 --> 50. EKG NSR inferolateral ST depression. Continues to smoker tobacco. Daughter states patient is complaint with her home meds, but not completely sure. Last echo with EF 35% + moderate DD. Recently had a angiogram 6/14 with stent to Lcx. ?Stent thrombosis.  - Cardiology consulted  - Lovenox BID therapeutic dosing  - BB, statin, Plavix and asa 81. Plavix reloaded.  - TTE with 15% EF from MI  - Lasix IV 40 mg bid for now  - Cath today

## 2019-07-01 NOTE — PLAN OF CARE
To patient's room to discuss patient managing her care at home.      Patient off unit for testing    TN placed SW, Lorelei's name and contact info placed on the communication board    [place]    [facility] [place] [address]  [address]  [place] [address]  Phone: [phone] Fax: [phone]    [id number] - [place] - [address]  [address]  [address]  [place] [address]  Phone: [phone] Fax: [phone]       07/01/19 1039   Discharge Assessment   Assessment Type Discharge Planning Assessment   Confirmed/corrected address and phone number on facesheet? Yes   Assessment information obtained from? Medical Record   Expected Length of Stay (days) 3   Communicated expected length of stay with patient/caregiver no  (Patient off unit)   Prior to hospitilization cognitive status: Unable to Assess   Prior to hospitalization functional status: Assistive Equipment   Current cognitive status: Unable to Assess   Current Functional Status: Needs Assistance  (in ICU)   Lives With child(zenaida), adult   Able to Return to Prior Arrangements yes   Is patient able to care for self after discharge? Unable to determine at this time (comments)   Who are your caregiver(s) and their phone number(s)? Dejah   Patient's perception of discharge disposition home health   Readmission Within the Last 30 Days other (see comments)  (Patient was discharged within the last few weeks with HH.  Off unit Unable to ask why she feels she reamitted.)   Patient currently being followed by outpatient case management? No   Patient currently receives any other outside agency services? Yes   Name and contact number of agency or person providing outside services [facility]   Is it the patient/care giver preference to resume care with the current outside agency?   (tbd)   Equipment Currently Used at Home cane,  quad;rollator;shower chair;oxygen   Do you have any problems affording any of your prescribed medications? No   Is the patient taking medications as prescribed? yes   Does the patient have transportation home? Yes   Transportation Anticipated family or friend will provide   Does the patient receive services at the [facility]? No   Discharge Plan A Home with family;Home Health   Discharge Plan B   (tbd)   Patient/Family in Agreement with Plan unable to assess

## 2019-07-01 NOTE — ASSESSMENT & PLAN NOTE
Hold all oral and home meds  SSI and accucheck  Titrate insulin as needed (decreased today due to hypoglycemia)  Hypoglycemia protocol in place

## 2019-07-01 NOTE — PROGRESS NOTES
[facility]  Pulmonology  Progress Note    Patient Name: Susan Howell  MRN: [id number]  Admission Date: 6/29/2019  Hospital Length of Stay: 2 days  Code Status: Full Code  Attending Provider: Ezequiel Gonzalez MD  Primary Care Provider: Linda Mckee MD   Principal Problem: Acute respiratory failure with hypoxia and hypercapnia    Subjective:     Interval History: No acute events overnight. LHC this AM and tolerated procedure well. On 2L NC this AM with SpO2 >97%.     Objective:     Vital Signs (Most Recent):  Temp: 98.2 °F (36.8 °C) (07/01/19 1130)  Pulse: 98 (07/01/19 1137)  Resp: 20 (07/01/19 1137)  BP: 132/68 (07/01/19 1133)  SpO2: 99 % (07/01/19 1137) Vital Signs (24h Range):  Temp:  [97.6 °F (36.4 °C)-98.6 °F (37 °C)] 98.2 °F (36.8 °C)  Pulse:  [] 98  Resp:  [9-52] 20  SpO2:  [63 %-100 %] 99 %  BP: ()/(49-72) 132/68  Arterial Line BP: (138-147)/(63-70) 147/70     Weight: 60.8 kg (134 lb)  Body mass index is 23 kg/m².      Intake/Output Summary (Last 24 hours) at 7/1/2019 1305  Last data filed at 7/1/2019 1116  Gross per 24 hour   Intake 1252.4 ml   Output 775 ml   Net 477.4 ml       Physical Exam   Constitutional: She is oriented to person, place, and time. She appears well-developed and well-nourished. She is cooperative. Nasal cannula in place.   HENT:   Head: Normocephalic and atraumatic.   Eyes: Pupils are equal, round, and reactive to light.   Neck: No tracheal deviation present. No thyromegaly present.   Cardiovascular: Normal rate and regular rhythm. Exam reveals no gallop and no friction rub.   No murmur heard.  Pulses:       Dorsalis pedis pulses are 2+ on the right side, and 2+ on the left side.   Pulmonary/Chest: Effort normal. No stridor. No tachypnea and no bradypnea. No respiratory distress. She has decreased breath sounds. She has no wheezes. She has no rhonchi. She has no rales.   Abdominal: Soft. Bowel sounds are normal. There is no tenderness.   Musculoskeletal:  Normal range of motion.   Neurological: She is alert and oriented to person, place, and time. No sensory deficit.   Skin: Skin is warm and dry.   Psychiatric: She has a normal mood and affect. Her speech is normal and behavior is normal.       Vents:  Vent Mode: CPAP (06/30/19 1145)  Ventilator Initiated: Yes (06/29/19 0457)  Set Rate: 16 bmp (06/30/19 0831)  Vt Set: 450 mL (06/30/19 0831)  Pressure Support: 5 cmH20 (06/30/19 1221)  PEEP/CPAP: 5 cmH20 (06/30/19 1221)  Oxygen Concentration (%): 25 (06/30/19 1221)  Peak Airway Pressure: 10.3 cmH2O (06/30/19 1221)  Total Ve: 13.4 mL (06/30/19 1221)  F/VT Ratio<105 (RSBI): (!) 24.29 (06/30/19 1221)    Lines/Drains/Airways     Drain                 Urethral Catheter 06/29/19 0520 Latex 18 Fr. 2 days          Peripheral Intravenous Line                 Peripheral IV - Single Lumen 06/29/19 0148 20 G Antecubital 2 days         Peripheral IV - Single Lumen 06/29/19 0350 22 G Right Forearm 2 days                Significant Labs:    CBC/Anemia Profile:  Recent Labs   Lab 06/30/19  0403 07/01/19  0415 07/01/19  1217   WBC 17.66* 15.57*  --    HGB 11.5* 10.5*  --    HCT 34.8* 32.8* 33*    208  --    MCV 79* 80*  --    RDW 20.9* 21.1*  --         Chemistries:  Recent Labs   Lab 06/30/19  0403 07/01/19  0415   * 139   K 4.5 4.2   CL 99 101   CO2 19* 28   BUN 30* 34*   CREATININE 1.1 1.0   CALCIUM 8.5* 8.8   ALBUMIN 3.4* 3.2*   PROT 6.5 6.1   BILITOT 0.4 0.7   ALKPHOS 72 59   ALT 92* 69*   * 266*       All pertinent labs within the past 24 hours have been reviewed.    Significant Imaging:  I have reviewed and interpreted all pertinent imaging results/findings within the past 24 hours.    Assessment/Plan:     * Acute respiratory failure with hypoxia and hypercapnia  Intubated for acute hypercapnic respiratory failure.  Suspect volume +/- sedatives.   --Extubated 6/30 to nasal cannula  --doing well on 2L NC    --Can restart home COPD inhalers  --Compa  PRN    Acute encephalopathy  Resolved.     NSTEMI (non-ST elevated myocardial infarction)  Continue Plavix, Asa, lovenox.    --C today per cardiology      Discussed with Dr. Nicole.     Patient updated at the bedside.     I have spent 35 min with this patient, with over 50% of this time spent coordinating care and speaking with the family    We will sign off. Thank you for the consult. Please call for any additional questions.      Cheri León PA-C  Pulmonology  [facility] [place]

## 2019-07-01 NOTE — PLAN OF CARE
Problem: Adult Inpatient Plan of Care  Goal: Plan of Care Review  Outcome: Ongoing (interventions implemented as appropriate)     07/01/19 7418   Plan of Care Review   Plan of Care Reviewed With patient     Patient is awake alert oriented, able to follow instructions properly and very pleasant. Vital signs stable and within limits. Denies any discomfort. NPO post midnight initiated for Cath this morning. Boggs with adequate urine output noted.

## 2019-07-01 NOTE — ASSESSMENT & PLAN NOTE
Intubated for acute hypercapnic respiratory failure.  Suspect volume +/- sedatives.   --Extubated 6/30 to nasal cannula  --doing well on 2L NC    --Can restart home COPD inhalers  --Compa CORRALN

## 2019-07-01 NOTE — BRIEF OP NOTE
[facility]  Brief Operative Note     SUMMARY     Surgery Date: 7/1/2019     Surgeon(s) and Role:  Panel 1:     * Job Turcios MD - Primary  Panel 2:     * Fermin Healy MD - Primary    Assisting Surgeon: None    Pre-op Diagnosis:  NSTEMI (non-ST elevated myocardial infarction) [I21.4]    Post-op Diagnosis:  Post-Op Diagnosis Codes:     * NSTEMI (non-ST elevated myocardial infarction) [I21.4]    Procedure(s) (LRB):  Left heart cath (Left)  Percutaneous coronary intervention (N/A)    Anesthesia: RN IV Sedation    Description of the findings of the procedure: see Dr. Turcios cath report for details    Findings/Key Components:  Dominance: Right  LM: MLI  LAD: prox eccentric 70%, mid stent patent, iFR 0.71  LCx: thrombus within 3.5x38 Resolute SAM (6/14/19) but T3 flow  RCA: , not injected, collateralized from LCA    PCI mid LCx AST:  Preop asa/plavix, intraop angiomax  POBA 3.5x20 at 20 tarah, excellent angio resule, T3 flow, 0% residual stenosis  IVUS with good stent expansion and apposition.  Distal vessel 8.3mm2, ost vessel 5.4mm2    PCI mid LAD  Predil 2.5x12  Stent 3.0x22 Resolute SAM 18 tarah  Excellent result, T3 flow, 0% residual stenosis    Hemostasis:  RFA man comp    Impression:  NSTEMI  3V CAD, severe LV dysfxn  Successful PCI:  AST mid LCx s/p 3.5x20 POBA/IVUS  Mid LAD (+iFR) 3.0x22 Resolute SAM  RFA man comp    Plan:  Obs overnight  ASA 81mg qd indefinitely  Plavix 75mg qd for 1 year (thru 7/2020)  BBl/ACEi/Statin  Home in am  Follow up with Dr. Turcios after discharge    Estimated Blood Loss: <50cc         Specimens: None

## 2019-07-01 NOTE — CARE UPDATE
[facility]  ICU Multidisciplinary Bedside Rounds   SUMMARY     Date: 7/1/2019    Prehospitalization: Home  Admit Date / LOS : 6/29/2019/ 2 days    Diagnosis: Acute respiratory failure with hypoxia and hypercapnia    Consults:        Active: Cardio and Pulm CC       Needed: PT     Code Status: Full Code  Advanced Directive: <no information>    LDA: Boggs       Central Lines/Site/Justification:Patient Does Not Have Central Line       Urinary Cath/Order/Justification:Critically Ill in ICU    Infusions: None       GOALS: Volume/ Hemodynamic: MAP >65                     RASS: 0  alert and calm    CAM ICU: Negative  Pain Management: none       Pain Controlled: not applicable     Rhythm: NSR    Respiratory Device: Nasal Cannula           VTE Prophylaxis: Pharm  Mobility: Bedrest  Stress Ulcer Prophylaxis: Yes    Dietary: NPO  Tolerance: not applicable  /  Advancement: yes    Isolation: No active isolations    Restraints: No    Noteworthy Labs:  none    Needs from Care Team: none     ICU LOS 1d 23h  Level of Care: Critical Care

## 2019-07-01 NOTE — SUBJECTIVE & OBJECTIVE
Interval History: No acute events overnight. LHC this AM and tolerated procedure well. On 2L NC this AM with SpO2 >97%.     Objective:     Vital Signs (Most Recent):  Temp: 98.2 °F (36.8 °C) (07/01/19 1130)  Pulse: 98 (07/01/19 1137)  Resp: 20 (07/01/19 1137)  BP: 132/68 (07/01/19 1133)  SpO2: 99 % (07/01/19 1137) Vital Signs (24h Range):  Temp:  [97.6 °F (36.4 °C)-98.6 °F (37 °C)] 98.2 °F (36.8 °C)  Pulse:  [] 98  Resp:  [9-52] 20  SpO2:  [63 %-100 %] 99 %  BP: ()/(49-72) 132/68  Arterial Line BP: (138-147)/(63-70) 147/70     Weight: 60.8 kg (134 lb)  Body mass index is 23 kg/m².      Intake/Output Summary (Last 24 hours) at 7/1/2019 1305  Last data filed at 7/1/2019 1116  Gross per 24 hour   Intake 1252.4 ml   Output 775 ml   Net 477.4 ml       Physical Exam   Constitutional: She is oriented to person, place, and time. She appears well-developed and well-nourished. She is cooperative. Nasal cannula in place.   HENT:   Head: Normocephalic and atraumatic.   Eyes: Pupils are equal, round, and reactive to light.   Neck: No tracheal deviation present. No thyromegaly present.   Cardiovascular: Normal rate and regular rhythm. Exam reveals no gallop and no friction rub.   No murmur heard.  Pulses:       Dorsalis pedis pulses are 2+ on the right side, and 2+ on the left side.   Pulmonary/Chest: Effort normal. No stridor. No tachypnea and no bradypnea. No respiratory distress. She has decreased breath sounds. She has no wheezes. She has no rhonchi. She has no rales.   Abdominal: Soft. Bowel sounds are normal. There is no tenderness.   Musculoskeletal: Normal range of motion.   Neurological: She is alert and oriented to person, place, and time. No sensory deficit.   Skin: Skin is warm and dry.   Psychiatric: She has a normal mood and affect. Her speech is normal and behavior is normal.       Vents:  Vent Mode: CPAP (06/30/19 1145)  Ventilator Initiated: Yes (06/29/19 0457)  Set Rate: 16 bmp (06/30/19 0831)  Vt  Set: 450 mL (06/30/19 0831)  Pressure Support: 5 cmH20 (06/30/19 1221)  PEEP/CPAP: 5 cmH20 (06/30/19 1221)  Oxygen Concentration (%): 25 (06/30/19 1221)  Peak Airway Pressure: 10.3 cmH2O (06/30/19 1221)  Total Ve: 13.4 mL (06/30/19 1221)  F/VT Ratio<105 (RSBI): (!) 24.29 (06/30/19 1221)    Lines/Drains/Airways     Drain                 Urethral Catheter 06/29/19 0520 Latex 18 Fr. 2 days          Peripheral Intravenous Line                 Peripheral IV - Single Lumen 06/29/19 0148 20 G Antecubital 2 days         Peripheral IV - Single Lumen 06/29/19 0350 22 G Right Forearm 2 days                Significant Labs:    CBC/Anemia Profile:  Recent Labs   Lab 06/30/19  0403 07/01/19  0415 07/01/19  1217   WBC 17.66* 15.57*  --    HGB 11.5* 10.5*  --    HCT 34.8* 32.8* 33*    208  --    MCV 79* 80*  --    RDW 20.9* 21.1*  --         Chemistries:  Recent Labs   Lab 06/30/19  0403 07/01/19  0415   * 139   K 4.5 4.2   CL 99 101   CO2 19* 28   BUN 30* 34*   CREATININE 1.1 1.0   CALCIUM 8.5* 8.8   ALBUMIN 3.4* 3.2*   PROT 6.5 6.1   BILITOT 0.4 0.7   ALKPHOS 72 59   ALT 92* 69*   * 266*       All pertinent labs within the past 24 hours have been reviewed.    Significant Imaging:  I have reviewed and interpreted all pertinent imaging results/findings within the past 24 hours.

## 2019-07-01 NOTE — NURSING
0827- Patient blood sugar 53.  She ios awake and oriented. Attempted to pull glucose tab from both ICU pyxis machines, no tabs available. Pharmacy notified. Patient given apple juice while waiting for tabs. Dr. Gonzalez notifed and cath nurse. Only able to chew 2 tabs but drank 1/2 apple juice. Next blood suagr 342

## 2019-07-01 NOTE — PROCEDURES
"Susan Howell is a 74 y.o. female patient.    Temp: 98.6 °F (37 °C) (07/01/19 0730)  Pulse: 90 (07/01/19 0800)  Resp: (!) 26 (07/01/19 0800)  BP: (!) 117/58 (07/01/19 0800)  SpO2: 100 % (07/01/19 0800)  Weight: 60.8 kg (134 lb) (06/30/19 0900)  Height: 5' 4" (162.6 cm) (06/30/19 0900)       Procedures     [facility]   Dr Turcios  Pre-op Dx NSTEMI  Post-op Dx same  Specimen none    7/1/19 [facility] - EDP 18, LAD moderate diffuse disease with mid 70%, Cx long proximal 50% - mid stent patent with thrombus noted in mid portion, RCA known to be occluded - distal vessel fills with left to right collaterals    Will review with Dr Healy for PCI Cx    Job Turcios  7/1/2019  "

## 2019-07-01 NOTE — ASSESSMENT & PLAN NOTE
Presented to ED with substernal CP after dinner. Stated to be different from her prior MI 2 weeks ago. Initially saturating well on RA (as per chart review). After receiving morphine, fluids, and lorazepam x 2 patient became more hypoxic and hypercarbic. Suspected iatrogenic + from her other actives medical issues this time. Pulmonary consulted for vent mgmt, sbt. Extubated 6/30 to nasal canula.   - Wean o2 as able  - Duonebs  - Tobacco cessation  - IV lasix  - Resolving

## 2019-07-01 NOTE — PROGRESS NOTES
[facility] Hospital Medicine  Progress Note    Patient Name: Susan Howell  MRN: [id number]  Patient Class: IP- Inpatient   Admission Date: 6/29/2019  Length of Stay: 2 days  Attending Physician: Ezequiel Gonzalez MD  Primary Care Provider: Linda Mckee MD      Subjective:     Principal Problem:Acute respiratory failure with hypoxia and hypercapnia      HPI:  Patient is intubated and sedated during my assessment. Hx taken from chart review.    74 yof with COPD on 2 L HOT, CAD, GERD, Chronic combined systolic diastolic heart failure, HTN, HLD, DMT2, Tobacco abuse, with recent admission for NSTEMI s/p [facility] with PCI to Lcx presenting to ED with non radiating substernal chest discomfort that started after dinner last night (6/28) with associated nasuea. Denied any fever, dyspnea, cough, abdominal pain, back pain, emesis. She described her pain different from her prior NSTEMI chest pain. Nitro by EMS didn't get. Given GI cocktail, morphine and ativan which didn't help either. Patient became more tachypnea despite duonebs, placed for bipap. Given lasix for suspected CHF exacerbation. Subsequently intubated for acute on chronic hypercapnic respiratory failure. Admitted to ICU.    Overview/Hospital Course:  Presented to ED with atypical non radiating substernal CP after dinner. Stated to be different from her prior MI 2 weeks ago. Initially saturating well on RA (as per chart review). After receiving morphine, fluids, and lorazepam x 2 patient became more hypoxic and hypercarbic, despite bipap. Intubated and sedated on propofol. Admitted to ICU. Trop initially 0.2. EKG NSR inferolateral ST depression. Cardiology consulted. Repeat Trop 0.2 --> 20 --> >50. Last echo with EF 35% + moderate DD. Recently had a angiogram 6/14 with stent to Lcx. ?Stent thrombosis. Started Lovenox BID therapeutic dosing while continuing BB, statin, Plavix and asa 81. Re-loaded on plavix. Repeat TTE with severely depressed EF. Lasix  IV 40 mg bid for now. Angiogram planned for today.     Interval History: Extubated yesterday, tolerated well. Lactate elevated yesterday, improved now. Mild hypotension overnight while asleep, asymptomatic, improved now. Awake and alert this AM. Hypoglycemia noted AM, oral glucose tabs given. LHC this AM.      Review of Systems   Constitutional: Negative for chills, diaphoresis and fatigue.   HENT: Negative for congestion, mouth sores, nosebleeds, sinus pressure, sore throat and trouble swallowing.    Eyes: Negative for photophobia and visual disturbance.   Respiratory: Negative for cough, chest tightness, shortness of breath and wheezing.    Cardiovascular: Negative for chest pain, palpitations and leg swelling.   Gastrointestinal: Negative for abdominal distention, abdominal pain, anal bleeding, blood in stool, constipation, diarrhea, nausea, rectal pain and vomiting.   Endocrine: Negative for polydipsia, polyphagia and polyuria.   Genitourinary: Negative.  Negative for dysuria, frequency, hematuria and urgency.   Musculoskeletal: Negative for arthralgias, back pain, gait problem, joint swelling, myalgias and neck pain.   Skin: Negative for color change, pallor, rash and wound.   Neurological: Negative for dizziness, seizures, syncope, weakness and headaches.   Psychiatric/Behavioral: Negative for agitation, behavioral problems, confusion and hallucinations.     Objective:     Vital Signs (Most Recent):  Temp: 98.6 °F (37 °C) (07/01/19 0730)  Pulse: 90 (07/01/19 0800)  Resp: (!) 26 (07/01/19 0800)  BP: (!) 117/58 (07/01/19 0800)  SpO2: 100 % (07/01/19 0800) Vital Signs (24h Range):  Temp:  [97.6 °F (36.4 °C)-98.6 °F (37 °C)] 98.6 °F (37 °C)  Pulse:  [] 90  Resp:  [9-52] 26  SpO2:  [63 %-100 %] 100 %  BP: ()/(49-72) 117/58     Weight: 60.8 kg (134 lb)  Body mass index is 23 kg/m².    Intake/Output Summary (Last 24 hours) at 7/1/2019 0996  Last data filed at 7/1/2019 0730  Gross per 24 hour   Intake  348.15 ml   Output 1050 ml   Net -701.85 ml      Physical Exam   Constitutional: She appears well-developed and well-nourished.   HENT:   Head: Normocephalic and atraumatic.   Mouth/Throat: Oropharynx is clear and moist.   Eyes: Pupils are equal, round, and reactive to light. No scleral icterus.   Neck: Normal range of motion. Neck supple.   Cardiovascular: Normal rate, regular rhythm and normal heart sounds.   Pulmonary/Chest: Effort normal and breath sounds normal. She has no wheezes. She has no rales.   Abdominal: Soft. Bowel sounds are normal. She exhibits no distension and no mass. There is no guarding.   Genitourinary:   Genitourinary Comments: Boggs   Musculoskeletal: She exhibits no edema, tenderness or deformity.   Neurological: Alert and Oriented   Skin: Skin is warm and dry. Capillary refill takes less than 2 seconds. No rash noted. No erythema. No pallor.   Nursing note and vitals reviewed.      Significant Labs: All pertinent labs within the past 24 hours have been reviewed.    Significant Imaging: I have reviewed and interpreted all pertinent imaging results/findings within the past 24 hours.      Assessment/Plan:      * Acute respiratory failure with hypoxia and hypercapnia  Presented to ED with substernal CP after dinner. Stated to be different from her prior MI 2 weeks ago. Initially saturating well on RA (as per chart review). After receiving morphine, fluids, and lorazepam x 2 patient became more hypoxic and hypercarbic. Suspected iatrogenic + from her other actives medical issues this time. Pulmonary consulted for vent mgmt, sbt. Extubated 6/30 to nasal canula.   - Wean o2 as able  - Duonebs  - Tobacco cessation  - IV lasix  - Resolving    NSTEMI (non-ST elevated myocardial infarction)  Presenting with atypical non radiating chest pain. Initial trop 0.2 ---> 20 --> 50. EKG NSR inferolateral ST depression. Continues to smoker tobacco. Daughter states patient is complaint with her home meds, but  not completely sure. Last echo with EF 35% + moderate DD. Recently had a angiogram 6/14 with stent to Lcx. ?Stent thrombosis.  - Cardiology consulted  - Lovenox BID therapeutic dosing  - BB, statin, Plavix and asa 81. Plavix reloaded.  - TTE with 15% EF from MI  - Lasix IV 40 mg bid for now  - Cath today    Acute on chronic combined systolic and diastolic congestive heart failure  As above. From acute MI.    Acute encephalopathy  Iatrogenic meds --> hypoxia, hypercarbia induced    Acid reflux  Noted.      Anemia of chronic disease  Noted. Stable.      Hyperlipidemia  High intensity statin      History of CVA (cerebrovascular accident)  Noted. Continue ASA, plavix, statin      Type 2 diabetes mellitus, uncontrolled  Hold all oral and home meds  SSI and accucheck  Titrate insulin as needed (decreased today due to hypoglycemia)  Hypoglycemia protocol in place    CAD s/p PCI  As above      Tobacco abuse  Still actively smoking as per daughter.      PVD (peripheral vascular disease)  Noted. ASA statin plavix       COPD (chronic obstructive pulmonary disease)  Noted. Duonebs. Tobacco cessation.    Hypertension  Home all home antihypertensive meds except for metoprolol         VTE Risk Mitigation (From admission, onward)    None                Ezequiel Gonzalez MD  Department of Hospital Medicine   [facility] [place]

## 2019-07-01 NOTE — PLAN OF CARE
"Problem: Adult Inpatient Plan of Care  Goal: Plan of Care Review  Outcome: Ongoing (interventions implemented as appropriate)  Received angiogram to right groin this shift. Premedicated with benadryl and steroid. Stent x1 and angioplasty. Came to ICU with sheath. Sheath pulled at 1245, pressure held until 1301. Patient tolerated well. No bleeding or hematoma. Denies pain at site. Remains on 2L O2. Patient has anxiety attacks and becomes tachypneic and tachycardic. O2 sats remain stable, but  Patient feels SOB. Ice chips or a fan help to calm patient. Daughter reports she has these "episodes" frequently at home as well. Tolerated diet. While NPO patient hypoglycemic, now hyperglycemic, last , sliding scale given.       "

## 2019-07-02 NOTE — ASSESSMENT & PLAN NOTE
Presenting with atypical non radiating chest pain. Initial trop 0.2 ---> 20 --> 50. EKG NSR inferolateral ST depression. Continues to smoker tobacco. Daughter states patient is complaint with her home meds, but not completely sure. Last echo with EF 35% + moderate DD. Recently had a angiogram 6/14 with stent to Lcx. ?Stent thrombosis.  - Cardiology consulted  - Lovenox daily  - BB, statin, Plavix and asa 81. Plavix reloaded.  - TTE with 15% EF from MI  - Lasix dc'd  - Cath 7/1- SAM to mid LAD  Follow up cardiology as scheduled

## 2019-07-02 NOTE — SUBJECTIVE & OBJECTIVE
Interval History:     Review of Systems   Constitution: Negative for decreased appetite.   HENT: Negative for ear discharge.    Eyes: Negative for blurred vision.   Respiratory: Negative for hemoptysis.    Endocrine: Negative for polyphagia.   Hematologic/Lymphatic: Negative for adenopathy.   Skin: Negative for color change.   Musculoskeletal: Negative for joint swelling.   Genitourinary: Negative for bladder incontinence.   Neurological: Negative for brief paralysis.   Psychiatric/Behavioral: Negative for hallucinations.   Allergic/Immunologic: Negative for hives.     Objective:     Vital Signs (Most Recent):  Temp: 98.2 °F (36.8 °C) (07/02/19 0715)  Pulse: 99 (07/02/19 0755)  Resp: (!) 29 (07/02/19 0755)  BP: (!) 152/82 (07/02/19 0753)  SpO2: 99 % (07/02/19 0755) Vital Signs (24h Range):  Temp:  [97.9 °F (36.6 °C)-98.6 °F (37 °C)] 98.2 °F (36.8 °C)  Pulse:  [] 99  Resp:  [20-45] 29  SpO2:  [95 %-100 %] 99 %  BP: (121-169)/() 152/82  Arterial Line BP: (138-147)/(63-70) 147/70     Weight: 60.8 kg (134 lb)  Body mass index is 23 kg/m².     SpO2: 99 %  O2 Device (Oxygen Therapy): nasal cannula      Intake/Output Summary (Last 24 hours) at 7/2/2019 0914  Last data filed at 7/2/2019 0600  Gross per 24 hour   Intake 1287.4 ml   Output 1160 ml   Net 127.4 ml       Lines/Drains/Airways     Drain                 Urethral Catheter 06/29/19 0520 Latex 18 Fr. 3 days          Peripheral Intravenous Line                 Peripheral IV - Single Lumen 06/29/19 0148 20 G Antecubital 3 days         Peripheral IV - Single Lumen 06/29/19 0350 22 G Right Forearm 3 days                Physical Exam   Constitutional: She appears well-developed and well-nourished.   HENT:   Head: Normocephalic and atraumatic.   Eyes: Pupils are equal, round, and reactive to light. Conjunctivae are normal.   Neck: Normal range of motion. Neck supple.   Cardiovascular: Normal rate, normal heart sounds and intact distal pulses.   Pulmonary/Chest:  Effort normal. She has decreased breath sounds.   Abdominal: Soft. Bowel sounds are normal.   Musculoskeletal: Normal range of motion.   Skin: Skin is warm and dry.       Significant Labs: All pertinent lab results from the last 24 hours have been reviewed.    Significant Imaging: Echocardiogram:   2D echo with color flow doppler:   Results for orders placed or performed during the hospital encounter of 03/03/16   2D echo with color flow doppler   Result Value Ref Range    QEF 40 (A) 55 - 65    Mitral Valve Regurgitation TRIVIAL     Diastolic Dysfunction Yes (A)     Est. PA Systolic Pressure 30.47     Pericardial Effusion NONE     Mitral Valve Mobility NORMAL     Tricuspid Valve Regurgitation TRIVIAL     Narrative    TEST DESCRIPTION   Technical Quality: This is a technically adequate study.     Aorta: The aortic root is normal in size, measuring 2.0 cm at sinotubular junction and 2.5 cm at Sinuses of Valsalva. The proximal ascending aorta is normal in size, measuring 1.8 cm across.     Left Atrium: The left atrial volume index is normal, measuring 22.70 cc/m2.     Left Ventricle: The left ventricle is normal in size, with an end-diastolic diameter of 5.0 cm, and an end-systolic diameter of 3.7 cm. Wall thickness is mildly increased, with the septum measuring 1.2 cm and the posterior wall measuring 1.1 cm across.   Relative wall thickness was increased at 0.44, and the LV mass index was increased at 149.1 g/m2 consistent with mild concentric left ventricular hypertrophy. Global left ventricular systolic function appears mildly to moderately depressed. Visually   estimated ejection fraction is 40-45%. The LV Doppler derived stroke volume equals 63.0 ccs.   The E/e'(lat) is 12, consistent with significant diastolic dysfunction.     Right Atrium: The right atrium is normal in size, measuring 4.1 cm in length and 2.7 cm in width in the apical view.     Right Ventricle: The right ventricle is normal in size measuring 2.9  cm at the base in the apical right ventricle-focused view. Global right ventricular systolic function appears normal. Tricuspid annular plane systolic excursion (TAPSE) is 1.6 cm. The   estimated PA systolic pressure is 30 mmHg.     Aortic Valve:  The aortic valve is mildly sclerotic with normal leaflet mobility. The aortic valve is tri-leaflet in structure.     Mitral Valve:  The mitral valve is normal in structure with normal leaflet mobility. There is trivial mitral regurgitation.     Tricuspid Valve:  The tricuspid valve is normal in structure with normal leaflet mobility. There is trivial tricuspid regurgitation.     Pulmonary Valve:  The pulmonic valve is not well seen.     Pericardium: There is no evidence of pericardial effusion.      IVC: IVC is enlarged but collapses > 50% with a sniff, suggesting intermediate right atrial pressure of 8 mmHg.     Intracavitary: There is no evidence of intracavity mass, thrombi, or vegetation.         CONCLUSIONS     1 - Mildly to moderately depressed left ventricular systolic function (EF 40-45%).  Mild global hypokinesis..     2 - Concentric hypertrophy.     3 - Left ventricular diastolic dysfunction.     4 - Trivial mitral regurgitation.     5 - Trivial tricuspid regurgitation.     6 - The estimated PA systolic pressure is 30 mmHg.         This document has been electronically    SIGNED BY: Fermin Healy MD On: 03/04/2016 15:03

## 2019-07-02 NOTE — NURSING TRANSFER
Nursing Transfer Note      7/2/2019     Transfer From:  to [id number]     Transfer via bed    Transfer with pt's belonging, O2, cardiac monitoring    Transported by Transport personal and Nurse     Medicines sent: insulin X2    Chart send with patient:  YES     Notified: pt's daughter Roma    Patient reassessed at: 07/02/19 at 11:15    Upon arrival to floor: cardiac monitor applied, patient oriented to room, call bell in reach and bed in lowest position

## 2019-07-02 NOTE — ASSESSMENT & PLAN NOTE
Presented to ED with substernal CP after dinner. Stated to be different from her prior MI 2 weeks ago. Initially saturating well on RA (as per chart review). After receiving morphine, fluids, and lorazepam x 2 patient became more hypoxic and hypercarbic. Suspected iatrogenic + from her other actives medical issues this time. Pulmonary consulted for vent mgmt, sbt. Extubated 6/30 to nasal canula.   - Wean o2 as able  - Duonebs  - Tobacco cessation  - IV lasix dc'd  - Resolving

## 2019-07-02 NOTE — PROVIDER TRANSFER
Presented to ED with atypical non radiating substernal CP after dinner. Stated to be different from her prior MI 2 weeks ago. Initially saturating well on RA (as per chart review). After receiving morphine, fluids, and lorazepam x 2 patient became more hypoxic and hypercarbic, despite bipap. Intubated and sedated on propofol. Admitted to ICU. Trop initially 0.2. EKG NSR inferolateral ST depression. Cardiology consulted. Repeat Trop 0.2 --> 20 --> >50. Last echo with EF 35% + moderate DD. Recently had a angiogram 6/14 with stent to Lcx. ?Stent thrombosis. Started Lovenox BID therapeutic dosing while continuing BB, statin, Plavix and asa 81. Re-loaded on plavix. Repeat TTE with severely depressed EF. Lasix IV 40 mg bid and now dc'd. SOB resolved.     S/p [facility] 7/1 - SAM placed to mid LAD. Monitored in ICU overnight. Per cardiology, ok to transfer to telemetry and if stable can dc home in am

## 2019-07-02 NOTE — PLAN OF CARE
Problem: Fall Injury Risk  Goal: Absence of Fall and Fall-Related Injury    Intervention: Identify and Manage Contributors to Fall Injury Risk     07/02/19 1547   Manage Acute Allergic Reaction   Medication Review/Management medications reviewed   Identify and Manage Contributors to Fall Injury Risk   Self-Care Promotion independence encouraged     Intervention: Promote Injury-Free Environment     07/02/19 1547   Optimize Clarke and Functional Mobility   Environmental Safety Modification assistive device/personal items within reach;clutter free environment maintained;room near unit station   Optimize Balance and Safe Activity   Safety Promotion/Fall Prevention assistive device/personal item within reach;bed alarm set;medications reviewed;nonskid shoes/socks when out of bed;room near unit station;side rails raised x 2;instructed to call staff for mobility         Problem: Adult Inpatient Plan of Care  Goal: Plan of Care Review     07/02/19 1547   Plan of Care Review   Plan of Care Reviewed With patient       Problem: Diabetes Comorbidity  Goal: Blood Glucose Level Within Desired Range    Intervention: Maintain Glycemic Control     07/02/19 1547   Monitor and Manage Ketoacidosis   Glycemic Management blood glucose monitoring

## 2019-07-02 NOTE — PROGRESS NOTES
[facility]  Cardiology  Progress Note    Patient Name: Susan Howell  MRN: [id number]  Admission Date: 6/29/2019  Hospital Length of Stay: 3 days  Code Status: Full Code   Attending Physician: Lyric Quiroz MD   Primary Care Physician: Linda Mckee MD  Expected Discharge Date:   Principal Problem:Acute respiratory failure with hypoxia and hypercapnia    Subjective:     Hospital Course:    Denies CP or SOB      7/1/19 [facility] - EDP 18, LAD moderate diffuse disease with mid 70%, Cx long proximal 50% - mid stent patent with thrombus noted in mid portion, RCA known to be occluded - distal vessel fills with left to right collaterals    Findings/Key Components:  Dominance: Right  LM: MLI  LAD: prox eccentric 70%, mid stent patent, iFR 0.71  LCx: thrombus within 3.5x38 Resolute SAM (6/14/19) but T3 flow  RCA: , not injected, collateralized from LCA     PCI mid LCx AST:  Preop asa/plavix, intraop angiomax  POBA 3.5x20 at 20 tarah, excellent angio resule, T3 flow, 0% residual stenosis  IVUS with good stent expansion and apposition.  Distal vessel 8.3mm2, ost vessel 5.4mm2     PCI mid LAD  Predil 2.5x12  Stent 3.0x22 Resolute SAM 18 tarah  Excellent result, T3 flow, 0% residual stenosis     Hemostasis:  RFA man comp     Impression:  NSTEMI  3V CAD, severe LV dysfxn  Successful PCI:  AST mid LCx s/p 3.5x20 POBA/IVUS  Mid LAD (+iFR) 3.0x22 Resolute SAM  RFA man comp     Plan:  Obs overnight  ASA 81mg qd indefinitely  Plavix 75mg qd for 1 year (thru 7/2020)  BBl/ACEi/Statin  Home in am  Follow up with Dr. Turcios after discharge       Interval History:     Review of Systems   Constitution: Negative for decreased appetite.   HENT: Negative for ear discharge.    Eyes: Negative for blurred vision.   Respiratory: Negative for hemoptysis.    Endocrine: Negative for polyphagia.   Hematologic/Lymphatic: Negative for adenopathy.   Skin: Negative for color change.   Musculoskeletal: Negative for joint swelling.    Genitourinary: Negative for bladder incontinence.   Neurological: Negative for brief paralysis.   Psychiatric/Behavioral: Negative for hallucinations.   Allergic/Immunologic: Negative for hives.     Objective:     Vital Signs (Most Recent):  Temp: 98.2 °F (36.8 °C) (07/02/19 0715)  Pulse: 99 (07/02/19 0755)  Resp: (!) 29 (07/02/19 0755)  BP: (!) 152/82 (07/02/19 0753)  SpO2: 99 % (07/02/19 0755) Vital Signs (24h Range):  Temp:  [97.9 °F (36.6 °C)-98.6 °F (37 °C)] 98.2 °F (36.8 °C)  Pulse:  [] 99  Resp:  [20-45] 29  SpO2:  [95 %-100 %] 99 %  BP: (121-169)/() 152/82  Arterial Line BP: (138-147)/(63-70) 147/70     Weight: 60.8 kg (134 lb)  Body mass index is 23 kg/m².     SpO2: 99 %  O2 Device (Oxygen Therapy): nasal cannula      Intake/Output Summary (Last 24 hours) at 7/2/2019 0914  Last data filed at 7/2/2019 0600  Gross per 24 hour   Intake 1287.4 ml   Output 1160 ml   Net 127.4 ml       Lines/Drains/Airways     Drain                 Urethral Catheter 06/29/19 0520 Latex 18 Fr. 3 days          Peripheral Intravenous Line                 Peripheral IV - Single Lumen 06/29/19 0148 20 G Antecubital 3 days         Peripheral IV - Single Lumen 06/29/19 0350 22 G Right Forearm 3 days                Physical Exam   Constitutional: She appears well-developed and well-nourished.   HENT:   Head: Normocephalic and atraumatic.   Eyes: Pupils are equal, round, and reactive to light. Conjunctivae are normal.   Neck: Normal range of motion. Neck supple.   Cardiovascular: Normal rate, normal heart sounds and intact distal pulses.   Pulmonary/Chest: Effort normal. She has decreased breath sounds.   Abdominal: Soft. Bowel sounds are normal.   Musculoskeletal: Normal range of motion.   Skin: Skin is warm and dry.       Significant Labs: All pertinent lab results from the last 24 hours have been reviewed.    Significant Imaging: Echocardiogram:   2D echo with color flow doppler:   Results for orders placed or performed  during the hospital encounter of 03/03/16   2D echo with color flow doppler   Result Value Ref Range    QEF 40 (A) 55 - 65    Mitral Valve Regurgitation TRIVIAL     Diastolic Dysfunction Yes (A)     Est. PA Systolic Pressure 30.47     Pericardial Effusion NONE     Mitral Valve Mobility NORMAL     Tricuspid Valve Regurgitation TRIVIAL     Narrative    TEST DESCRIPTION   Technical Quality: This is a technically adequate study.     Aorta: The aortic root is normal in size, measuring 2.0 cm at sinotubular junction and 2.5 cm at Sinuses of Valsalva. The proximal ascending aorta is normal in size, measuring 1.8 cm across.     Left Atrium: The left atrial volume index is normal, measuring 22.70 cc/m2.     Left Ventricle: The left ventricle is normal in size, with an end-diastolic diameter of 5.0 cm, and an end-systolic diameter of 3.7 cm. Wall thickness is mildly increased, with the septum measuring 1.2 cm and the posterior wall measuring 1.1 cm across.   Relative wall thickness was increased at 0.44, and the LV mass index was increased at 149.1 g/m2 consistent with mild concentric left ventricular hypertrophy. Global left ventricular systolic function appears mildly to moderately depressed. Visually   estimated ejection fraction is 40-45%. The LV Doppler derived stroke volume equals 63.0 ccs.   The E/e'(lat) is 12, consistent with significant diastolic dysfunction.     Right Atrium: The right atrium is normal in size, measuring 4.1 cm in length and 2.7 cm in width in the apical view.     Right Ventricle: The right ventricle is normal in size measuring 2.9 cm at the base in the apical right ventricle-focused view. Global right ventricular systolic function appears normal. Tricuspid annular plane systolic excursion (TAPSE) is 1.6 cm. The   estimated PA systolic pressure is 30 mmHg.     Aortic Valve:  The aortic valve is mildly sclerotic with normal leaflet mobility. The aortic valve is tri-leaflet in structure.     Mitral  Valve:  The mitral valve is normal in structure with normal leaflet mobility. There is trivial mitral regurgitation.     Tricuspid Valve:  The tricuspid valve is normal in structure with normal leaflet mobility. There is trivial tricuspid regurgitation.     Pulmonary Valve:  The pulmonic valve is not well seen.     Pericardium: There is no evidence of pericardial effusion.      IVC: IVC is enlarged but collapses > 50% with a sniff, suggesting intermediate right atrial pressure of 8 mmHg.     Intracavitary: There is no evidence of intracavity mass, thrombi, or vegetation.         CONCLUSIONS     1 - Mildly to moderately depressed left ventricular systolic function (EF 40-45%).  Mild global hypokinesis..     2 - Concentric hypertrophy.     3 - Left ventricular diastolic dysfunction.     4 - Trivial mitral regurgitation.     5 - Trivial tricuspid regurgitation.     6 - The estimated PA systolic pressure is 30 mmHg.         This document has been electronically    SIGNED BY: Fermin Healy MD On: 03/04/2016 15:03     Assessment and Plan:     Brief HPI:     * Acute respiratory failure with hypoxia and hypercapnia  Combination of CHF and COPD. Vent management per pulmonary    Acute on chronic combined systolic and diastolic congestive heart failure  Baseline EF 35%. Repeat echo prelim with severely depressed EF. Diuresis and afterload reduction as tolerated    NSTEMI (non-ST elevated myocardial infarction)  S/P POBA of Cx for IST and SAM to LAD. Ok for telemetry. Home soon. Will f/u prn    Hyperlipidemia  On statin    History of CVA (cerebrovascular accident)       Type 2 diabetes mellitus, uncontrolled  Per primary    CAD s/p PCI  As above        VTE Risk Mitigation (From admission, onward)        Ordered     Reason for No Pharmacological VTE Prophylaxis  Once      07/01/19 1001     IP VTE HIGH RISK PATIENT  Once      07/01/19 1001        Will f/u prn    Job Turcios MD  Cardiology  [facility]

## 2019-07-02 NOTE — PROGRESS NOTES
[facility] Hospital Medicine  Progress Note    Patient Name: Susan Howell  MRN: [id number]  Patient Class: IP- Inpatient   Admission Date: 6/29/2019  Length of Stay: 3 days  Attending Physician: Lyric Quiroz MD  Primary Care Provider: Linda Mckee MD        Subjective:     Principal Problem:Acute respiratory failure with hypoxia and hypercapnia      HPI:  Patient is intubated and sedated during my assessment. Hx taken from chart review.    74 yof with COPD on 2 L HOT, CAD, GERD, Chronic combined systolic diastolic heart failure, HTN, HLD, DMT2, Tobacco abuse, with recent admission for NSTEMI s/p [facility] with PCI to Lcx presenting to ED with non radiating substernal chest discomfort that started after dinner last night (6/28) with associated nasuea. Denied any fever, dyspnea, cough, abdominal pain, back pain, emesis. She described her pain different from her prior NSTEMI chest pain. Nitro by EMS didn't get. Given GI cocktail, morphine and ativan which didn't help either. Patient became more tachypnea despite duonebs, placed for bipap. Given lasix for suspected CHF exacerbation. Subsequently intubated for acute on chronic hypercapnic respiratory failure. Admitted to ICU.    Overview/Hospital Course:  Presented to ED with atypical non radiating substernal CP after dinner. Stated to be different from her prior MI 2 weeks ago. Initially saturating well on RA (as per chart review). After receiving morphine, fluids, and lorazepam x 2 patient became more hypoxic and hypercarbic, despite bipap. Intubated and sedated on propofol. Admitted to ICU. Trop initially 0.2. EKG NSR inferolateral ST depression. Cardiology consulted. Repeat Trop 0.2 --> 20 --> >50. Last echo with EF 35% + moderate DD. Recently had a angiogram 6/14 with stent to Lcx. ?Stent thrombosis. Started Lovenox BID therapeutic dosing while continuing BB, statin, Plavix and asa 81. Re-loaded on plavix. Repeat TTE with severely depressed EF.  Lasix IV 40 mg bid for now. Angiogram planned for today.     S/p [facility] 7/1 - SAM placed to mid LAD. Monitored in ICU overnight. Per cardiology, ok to transfer to telemetry and if stable can dc home in am     Interval History: pt denies CP and SOB    Review of Systems   Constitutional: Negative for chills, diaphoresis and fatigue.   HENT: Negative for congestion, mouth sores, nosebleeds, sinus pressure, sore throat and trouble swallowing.    Eyes: Negative for photophobia and visual disturbance.   Respiratory: Negative for cough, chest tightness, shortness of breath and wheezing.    Cardiovascular: Negative for chest pain, palpitations and leg swelling.   Gastrointestinal: Negative for abdominal distention, abdominal pain, anal bleeding, blood in stool, constipation, diarrhea, nausea, rectal pain and vomiting.   Endocrine: Negative for polydipsia, polyphagia and polyuria.   Genitourinary: Negative.  Negative for dysuria, frequency, hematuria and urgency.   Musculoskeletal: Negative for arthralgias, back pain, gait problem, joint swelling, myalgias and neck pain.   Skin: Negative for color change, pallor, rash and wound.   Neurological: Negative for dizziness, seizures, syncope, weakness and headaches.   Psychiatric/Behavioral: Negative for agitation, behavioral problems, confusion and hallucinations.     Objective:     Vital Signs (Most Recent):  Temp: 98.2 °F (36.8 °C) (07/02/19 0715)  Pulse: 91 (07/02/19 1128)  Resp: (!) 34 (07/02/19 1128)  BP: (!) 152/82 (07/02/19 0753)  SpO2: 99 % (07/02/19 1128) Vital Signs (24h Range):  Temp:  [97.9 °F (36.6 °C)-98.6 °F (37 °C)] 98.2 °F (36.8 °C)  Pulse:  [] 91  Resp:  [20-45] 34  SpO2:  [95 %-100 %] 99 %  BP: (121-169)/() 152/82     Weight: 60.8 kg (134 lb)  Body mass index is 23 kg/m².    Intake/Output Summary (Last 24 hours) at 7/2/2019 1219  Last data filed at 7/2/2019 0600  Gross per 24 hour   Intake 275 ml   Output 960 ml   Net -685 ml      Physical Exam    Constitutional: She appears well-developed and well-nourished.   HENT:   Head: Normocephalic and atraumatic.   Mouth/Throat: Oropharynx is clear and moist.   Eyes: Pupils are equal, round, and reactive to light. No scleral icterus.   Neck: Normal range of motion. Neck supple.   Cardiovascular: Normal rate, regular rhythm and normal heart sounds.   Pulmonary/Chest: Effort normal and breath sounds normal. She has no wheezes. She has no rales.   Abdominal: Soft. Bowel sounds are normal. She exhibits no distension and no mass. There is no guarding.   Musculoskeletal: She exhibits no edema, tenderness or deformity.   Neurological: She is alert.   Skin: Skin is warm and dry. Capillary refill takes less than 2 seconds. No rash noted. No erythema. No pallor.   Psychiatric:   Agitated    Nursing note and vitals reviewed.      Significant Labs: All pertinent labs within the past 24 hours have been reviewed.    Significant Imaging: I have reviewed and interpreted all pertinent imaging results/findings within the past 24 hours.      Assessment/Plan:      * Acute respiratory failure with hypoxia and hypercapnia  Presented to ED with substernal CP after dinner. Stated to be different from her prior MI 2 weeks ago. Initially saturating well on RA (as per chart review). After receiving morphine, fluids, and lorazepam x 2 patient became more hypoxic and hypercarbic. Suspected iatrogenic + from her other actives medical issues this time. Pulmonary consulted for vent mgmt, sbt. Extubated 6/30 to nasal canula.   - Wean o2 as able  - Duonebs  - Tobacco cessation  - IV lasix dc'd  - Resolving    Acute encephalopathy  Iatrogenic meds --> hypoxia, hypercarbia induced    Resolved     Acute on chronic combined systolic and diastolic congestive heart failure  As above. From acute MI.    Acid reflux  Pepcid      Anemia of chronic disease  Noted. Stable.      NSTEMI (non-ST elevated myocardial infarction)  Presenting with atypical non  radiating chest pain. Initial trop 0.2 ---> 20 --> 50. EKG NSR inferolateral ST depression. Continues to smoker tobacco. Daughter states patient is complaint with her home meds, but not completely sure. Last echo with EF 35% + moderate DD. Recently had a angiogram 6/14 with stent to Lcx. ?Stent thrombosis.  - Cardiology consulted  - Lovenox daily  - BB, statin, Plavix and asa 81. Plavix reloaded.  - TTE with 15% EF from MI  - Lasix dc'd  - Cath 7/1- SAM to mid LAD  Follow up cardiology as scheduled     Hyperlipidemia  High intensity statin      History of CVA (cerebrovascular accident)  Noted. Continue ASA, plavix, statin      Type 2 diabetes mellitus, uncontrolled  Hold all oral and home meds  SSI and accucheck  Titrate insulin as needed (decreased today due to hypoglycemia)  Hypoglycemia protocol in place    CAD s/p PCI  As above      Tobacco abuse  Still actively smoking as per daughter.      PVD (peripheral vascular disease)  Noted. ASA statin plavix       COPD (chronic obstructive pulmonary disease)  Noted. Duonebs. Tobacco cessation.    Hypertension  Home all home antihypertensive meds except for metoprolol         VTE Risk Mitigation (From admission, onward)        Ordered     Reason for No Pharmacological VTE Prophylaxis  Once      07/01/19 1001     IP VTE HIGH RISK PATIENT  Once      07/01/19 1001          Critical care time spent on the evaluation and treatment of severe organ dysfunction, review of pertinent labs and imaging studies, discussions with consulting providers and discussions with patient/family: 40 minutes.      Lyric Quiroz MD  Department of Hospital Medicine   [facility]

## 2019-07-02 NOTE — PLAN OF CARE
Problem: Adult Inpatient Plan of Care  Goal: Plan of Care Review  Outcome: Ongoing (interventions implemented as appropriate)     07/02/19 0624   Plan of Care Review   Plan of Care Reviewed With patient      Patient is awake alert oriented. Able to move independently on bed with minimal assistance. S/P Heart cath yesterday with right groin dressing. Clean dry and intact No bruises or hematoma noted.on site this morning. Folet with adequate urine output. Vital signs stable and within limits

## 2019-07-02 NOTE — SUBJECTIVE & OBJECTIVE
Interval History: pt denies CP and SOB    Review of Systems   Constitutional: Negative for chills, diaphoresis and fatigue.   HENT: Negative for congestion, mouth sores, nosebleeds, sinus pressure, sore throat and trouble swallowing.    Eyes: Negative for photophobia and visual disturbance.   Respiratory: Negative for cough, chest tightness, shortness of breath and wheezing.    Cardiovascular: Negative for chest pain, palpitations and leg swelling.   Gastrointestinal: Negative for abdominal distention, abdominal pain, anal bleeding, blood in stool, constipation, diarrhea, nausea, rectal pain and vomiting.   Endocrine: Negative for polydipsia, polyphagia and polyuria.   Genitourinary: Negative.  Negative for dysuria, frequency, hematuria and urgency.   Musculoskeletal: Negative for arthralgias, back pain, gait problem, joint swelling, myalgias and neck pain.   Skin: Negative for color change, pallor, rash and wound.   Neurological: Negative for dizziness, seizures, syncope, weakness and headaches.   Psychiatric/Behavioral: Negative for agitation, behavioral problems, confusion and hallucinations.     Objective:     Vital Signs (Most Recent):  Temp: 98.2 °F (36.8 °C) (07/02/19 0715)  Pulse: 91 (07/02/19 1128)  Resp: (!) 34 (07/02/19 1128)  BP: (!) 152/82 (07/02/19 0753)  SpO2: 99 % (07/02/19 1128) Vital Signs (24h Range):  Temp:  [97.9 °F (36.6 °C)-98.6 °F (37 °C)] 98.2 °F (36.8 °C)  Pulse:  [] 91  Resp:  [20-45] 34  SpO2:  [95 %-100 %] 99 %  BP: (121-169)/() 152/82     Weight: 60.8 kg (134 lb)  Body mass index is 23 kg/m².    Intake/Output Summary (Last 24 hours) at 7/2/2019 1219  Last data filed at 7/2/2019 0600  Gross per 24 hour   Intake 275 ml   Output 960 ml   Net -685 ml      Physical Exam   Constitutional: She appears well-developed and well-nourished.   HENT:   Head: Normocephalic and atraumatic.   Mouth/Throat: Oropharynx is clear and moist.   Eyes: Pupils are equal, round, and reactive to light.  No scleral icterus.   Neck: Normal range of motion. Neck supple.   Cardiovascular: Normal rate, regular rhythm and normal heart sounds.   Pulmonary/Chest: Effort normal and breath sounds normal. She has no wheezes. She has no rales.   Abdominal: Soft. Bowel sounds are normal. She exhibits no distension and no mass. There is no guarding.   Musculoskeletal: She exhibits no edema, tenderness or deformity.   Neurological: She is alert.   Skin: Skin is warm and dry. Capillary refill takes less than 2 seconds. No rash noted. No erythema. No pallor.   Psychiatric:   Agitated    Nursing note and vitals reviewed.      Significant Labs: All pertinent labs within the past 24 hours have been reviewed.    Significant Imaging: I have reviewed and interpreted all pertinent imaging results/findings within the past 24 hours.

## 2019-07-02 NOTE — PLAN OF CARE
Problem: Adult Inpatient Plan of Care  Goal: Plan of Care Review  Pt on 2L NC overnight. Tolerated well. Q4 tx.

## 2019-07-02 NOTE — CARE UPDATE
Date: 7/2/2019     Prehospitalization: Home  Admit Date / LOS : 6/29/2019/ 3 days     Diagnosis: Acute respiratory failure with hypoxia and hypercapnia     Consults:        Active: Cardio and Pulm CC       Needed: PT     Code Status: Full Code  Advanced Directive: <no information>     LDA: [place]       Central Lines/Site/Justification:Patient Does Not Have Central Line       Urinary Cath/Order/Justification:Critically Ill in ICU     Infusions: None       GOALS: Volume/ Hemodynamic: MAP >65                     RASS: 0  alert and calm     CAM ICU: Negative  Pain Management: none       Pain Controlled: not applicable      Rhythm: NSR     Respiratory Device: Nasal Cannula           VTE Prophylaxis: Pharm  Mobility: Bedrest  Stress Ulcer Prophylaxis: Yes     Dietary: NPO  Tolerance: not applicable  /  Advancement: yes     Isolation: No active isolations     Restraints: No     Noteworthy Labs:  none     Needs from Care Team: none                                                                                                                                                                                                                         ICU LOS 1d 23h  Level of Care: Critical Care

## 2019-07-02 NOTE — NURSING TRANSFER
Nursing Transfer Note      7/2/2019     Transfer From: ICU    Transfer via bed    Transfer with  O2, cardiac monitoring    Transported by transporter/ ICU nurse    Medicines sent: No    Chart send with patient: Yes    Notified: daughter    Patient reassessed at: 07/2/2017, 1520    Upon arrival to floor: cardiac monitor applied, patient oriented to room, call bell in reach and bed in lowest position

## 2019-07-02 NOTE — CARE UPDATE
[facility]  ICU Multidisciplinary Bedside Rounds     UPDATE     Date: 7/2/2019      Plan of care reviewed with the following, Nurse, Charge Nurse, Physician, Resp. Therapist and Infection Prevention.       Needs/ Goals for the day: may transfer to tele.       Level of Care: OK to Transfer per card.

## 2019-07-02 NOTE — HOSPITAL COURSE
Denies CP or SOB      7/1/19 [facility] - EDP 18, LAD moderate diffuse disease with mid 70%, Cx long proximal 50% - mid stent patent with thrombus noted in mid portion, RCA known to be occluded - distal vessel fills with left to right collaterals    Findings/Key Components:  Dominance: Right  LM: MLI  LAD: prox eccentric 70%, mid stent patent, iFR 0.71  LCx: thrombus within 3.5x38 Resolute SAM (6/14/19) but T3 flow  RCA: , not injected, collateralized from LCA     PCI mid LCx AST:  Preop asa/plavix, intraop angiomax  POBA 3.5x20 at 20 tarah, excellent angio resule, T3 flow, 0% residual stenosis  IVUS with good stent expansion and apposition.  Distal vessel 8.3mm2, ost vessel 5.4mm2     PCI mid LAD  Predil 2.5x12  Stent 3.0x22 Resolute SAM 18 tarah  Excellent result, T3 flow, 0% residual stenosis     Hemostasis:  RFA man comp     Impression:  NSTEMI  3V CAD, severe LV dysfxn  Successful PCI:  AST mid LCx s/p 3.5x20 POBA/IVUS  Mid LAD (+iFR) 3.0x22 Resolute SAM  RFA man comp     Plan:  Obs overnight  ASA 81mg qd indefinitely  Plavix 75mg qd for 1 year (thru 7/2020)  BBl/ACEi/Statin  Home in am  Follow up with Dr. Turcios after discharge

## 2019-07-03 NOTE — NURSING
Reported off to oncoming nurse, patient resting in bed, aaox3, confused at times. Patient can make needs known to staff, minimal assist required for adls and transfers, no acute distress noted, safety precautions maintained.    Chart check completed.

## 2019-07-03 NOTE — PLAN OF CARE
Patient was accepted to [facility] for continuity of home health services. SW awaiting authorization from N.          07/03/19 1149   Post-Acute Status   Post-Acute Authorization Home Health/Hospice   Home Health/Hospice Status Pending Payor Review

## 2019-07-03 NOTE — NURSING
"Upon rounding during shift change, patient pulled inhaler out of purse to try and use, nurse educated patient on the use of outside medication, patient became very agitated and belligerent, stating "put my damn purse down." Nurse informed oncoming nurse of patient behavior.  Charge nurse notified.  "

## 2019-07-03 NOTE — PROGRESS NOTES
RANDI contacted patient's daughter Yamila to inform that patient will be discharging today. Daughter stated that she is in [place] and will be going to [place] today and will not be able to pick her up. Daughter stated that she was going to call her sister Irma and let her know. SW informed her that she would call her. After several attempts, SW could not reach Irma. SW called daughter, Yamila and stated that SW could not reach sister. Daughter stated her sister may be at work and won't get off until 3pm. SW asked daughter to call her back once she has reached her sister.       Yamila contacted RANDI and informed that her sister will pick patient up after 3pm when she gets off of work.

## 2019-07-03 NOTE — PROGRESS NOTES
RANDI called to schedule an appointment with Dr. Healy. Khanh, scheduling at [facility] stated that doctor did not have any appointments available and that she will send the office a message and they will call RANDI.

## 2019-07-03 NOTE — PT/OT/SLP EVAL
Physical Therapy Evaluation    Patient Name:  Susan Howell   MRN:  [id number]    Recommendations:     Discharge Recommendations:  home health PT(with family assistance)   Discharge Equipment Recommendations: none   Barriers to discharge: None    Assessment:     Susan Howell is a 74 y.o. female admitted with a medical diagnosis of Acute respiratory failure with hypoxia and hypercapnia.  She presents with the following impairments/functional limitations:  weakness, impaired endurance, impaired functional mobilty, gait instability, impaired balance, decreased lower extremity function, decreased safety awareness, impaired cardiopulmonary response to activity.    Rehab Prognosis: Fair+; patient would benefit from acute skilled PT services to address these deficits and reach maximum level of function.    Recent Surgery: Procedure(s) (LRB):  Left heart cath (Left)  Percutaneous coronary intervention (N/A)  IVUS, Coronary  INSERTION, STENT, CORONARY ARTERY (N/A) 2 Days Post-Op; Pt s/p extubation.      Plan:     During this hospitalization, patient to be seen 5 x/week(M-F) to address the identified rehab impairments via gait training, therapeutic activities, therapeutic exercises and progress toward the following goals:    · Plan of Care Expires:  07/17/19    Subjective     Chief Complaint: SOB with activities  Patient/Family Comments/goals: to go home today   Pain/Comfort:  · Pain Rating 1: 0/10    Living Environment:  Pt lives with daughter in a SS house with ~17 steps at entry, however has an elevator.  One of pt's daughter is a nurse.   Prior to admission, patients level of function was mod I with ambulation using QC/walker and home O2 on 2L PRN.  Equipment used at home: cane, quad, bedside commode, shower chair, rollator, walker, rolling, oxygen.  Upon discharge, patient will have assistance from children, grandchildren, neighbors, and friends.    Objective:     Patient found standing in room with PCT (just came out of the  bathroom) with oxygen, peripheral IV, telemetry upon PT entry to room.    General Precautions: Standard, fall, seizure, diabetic, hearing impaired, respiratory   Orthopedic Precautions:N/A   Braces: N/A     Exams:  · Cognitive Exam:  Patient is oriented to Person and Place.  Pt able to state  and age.   · Gross Motor Coordination:  WFL  · Postural Exam:  Patient presented with the following abnormalities:    · -       Rounded shoulders  · Sensation:    · -       Intact  light/touch BLE  · Skin Integrity/Edema:      · -       Skin integrity: Visible skin intact  · -       Edema: None noted BLE  · BLE ROM: WFL  · BLE Strength: WFL    Functional Mobility:  Pt found standing at the sink with PCT, just came out from the bathroom.  Pt appeared SOB, assisted pt back to bed with placement of 2L O2 NC.  Pt required some time to recover.  Pt very pleasant and talkative, did not appeared to be in distress.     · Transfers:     · Sit to Stand:  stand by assistance with no AD and hand-held assist  · Gait: Pt ambulated ~20 ft within room with hand-held assistance and 2L O2 NC.  Pt with decreased step length and kathi.  Pt with SOB.  · Balance: Pt with fair+ balance.      Therapeutic Activities and Exercises:  Pt was recently here at [facility], evaluated by PT on 6/15/19 and D/C'ed home with HHPT.  Pt did received HEP for UE/LE therex at that time.  Pt re-educated on BLE seated therex: hip flex, LAQ and AP.  Pt declined performing LE therex at this time, reported that she does them at home with therapy.      AM-PAC 6 CLICK MOBILITY  Total Score:22     Patient left seated EOB with all lines intact, call button in reach and bed alarm on.    GOALS:   Multidisciplinary Problems     Physical Therapy Goals        Problem: Physical Therapy Goal    Goal Priority Disciplines Outcome Goal Variances Interventions   Physical Therapy Goal     PT, PT/OT      Description:  Goals to be met by: 19     Patient will increase functional  independence with mobility by performin. Supine to sit with Modified [place]  2. Rolling to Left and Right with Modified [place]  3. Sit to stand transfer with Modified [place]  4. Bed to chair transfer with Modified [place]   5. Gait  x150 feet with Modified [place] using Rolling Walker and O2  6. Lower extremity exercise program 3 sets x10 reps per handout, with independence                      History:     Past Medical History:   Diagnosis Date    Asthma     Cancer 10/10/2014    Bone cancer     CHF (congestive heart failure)     Cigarette smoker     COPD (chronic obstructive pulmonary disease)     Diabetes mellitus     Diabetes mellitus type II     Emphysema of lung     [place] (hard of hearing)     Hypercholesteremia     Hypertension     Myocardial infarct 2013    On home oxygen therapy     Palpitations     Pneumonia 2019    PVD (peripheral vascular disease)     Seizures     Stroke     Unsteady gait     uses a cane & walker       Past Surgical History:   Procedure Laterality Date    APPENDECTOMY       SECTION      CHOLECYSTECTOMY      HYSTERECTOMY      INSERTION, STENT, CORONARY ARTERY N/A 2019    Performed by Fermin Healy MD at [facility] CATH LAB    IVUS, Coronary  2019    Performed by [facility]aly MD at [facility] CATH LAB    Left heart cath Left 2019    Performed by Job Turcios MD at [facility] CATH LAB    Left heart cath Left 2019    Performed by Fermin Healy MD at [facility] CATH [facility]    Percutaneous coronary intervention N/A 2019    Performed by [facility] MD at [facility] CATH LAB    VASCULAR SURGERY      stent in L leg; unable to place in R leg d/t blockage       Time Tracking:     PT Received On: 19  PT Start Time: 1031     PT Stop Time: 1041  PT Total Time (min): 10 min     Billable Minutes: Evaluation 10 min      Ann Marie Coleman, PT  2019

## 2019-07-03 NOTE — PROGRESS NOTES
RANDI scheduled appointments.     Follow-up Information     Dr. Lio Orozco. Go on 7/10/2019.    Why[facility] at 9:30 am.   Contact information:  [address] [place], [place] [address] [address] [phone]           Job Turcios MD. Go on 7/31/2019.    Specialty:  Cardiology  Why:  [facility] with Cardiologist at 9:45 am.   Contact information:  [address]  [address]  [facility] [address] [phone]             [facility].    Specialties:  DME Provider, Home Health Services  Why:  Agency will call you to schedule visit.   Contact information:  [address] [address] [address] [phone]

## 2019-07-03 NOTE — PLAN OF CARE
Problem: Physical Therapy Goal  Goal: Physical Therapy Goal  Goals to be met by: 19     Patient will increase functional independence with mobility by performin. Supine to sit with Modified [place]  2. Rolling to Left and Right with Modified [place]  3. Sit to stand transfer with Modified [place]  4. Bed to chair transfer with Modified [place]   5. Gait  x150 feet with Modified [place] using Rolling Walker and O2  6. Lower extremity exercise program 3 sets x10 reps per handout, with independence    Pt ambulating in room with hand-held assistance.  Pt with decreased endurance, will benefit from home health PT services.

## 2019-07-03 NOTE — NURSING
Received report from JOVANY Butler,. Patient sitting up in bed, Confused at times. NAD noted, safety precautions in progress, will monitor.

## 2019-07-03 NOTE — PLAN OF CARE
Patient's daughter, Yamila stated that her sister Irma will pick patient up when she gets off of work after 3:00 pm. SW informed nurse Tracy that patient is ready for discharge from case management standpoint.        07/03/19 1445   Final Note   Assessment Type Final Discharge Note   Hospital Follow Up  Appt(s) scheduled? Yes   Discharge plans and expectations educations in teach back method with documentation complete? No   Right Care Referral Info   Post Acute Recommendation Home-care   Referral Type Home Health    Facility Name [address] [address]

## 2019-07-03 NOTE — PLAN OF CARE
"Ruba from [facility] contacted RANDI to inform that [facility] does not have OT in [place] and that the order needs to be changed to read, "PT can provide OT exercises." RANDI contacted Dr. Bates via secure chat to inform him of the change needed. Diana informed RANDI that the order has to be resubmitted for authorization.    RANDI sent new order, awaiting authorization from [facility].     RANDI called [facility] to follow up on authorization. RANDI was told by customer service that the new order has been received. Customer Service stated that they will reach out to Ruba.       07/03/19 1300   Post-Acute Status   Post-Acute Authorization Home Health/Hospice   Home Health/Hospice Status Pending Payor Review     "

## 2019-07-03 NOTE — CONSULTS
RANDI sent d/c information to [facility] to continue home health services. RANDI also tried to schedule a follow up appointment with Dr. Healy but was unable too. Khanh in scheduling informed RANDI that she will send a message to his office and they will call RANDI. RANDI also sent a message through [email or web address] to Dr. Healy's office for appointment.     Dr. Healy's office called and informed RANDI that patient sees Dr. Turcios and they don't see each other's patients. The only available appointment for Dr Turcios is 7/31/2019 at 9:45 am.

## 2019-07-03 NOTE — PLAN OF CARE
[facility]    HOME HEALTH ORDERS  FACE TO FACE ENCOUNTER    Patient Name: Susan Howell  YOB: 1945    PCP: Linda Mckee MD   PCP Address: [address] [place] [address] [address] / [place] [address]  PCP Phone Number: [phone]  PCP Fax: [phone]    Encounter Date: 07/03/2019    Admit to Home Health    Diagnoses:  Active Hospital Problems    Diagnosis  POA    *Acute respiratory failure with hypoxia and hypercapnia [J96.01, J96.02]  Yes    Acute encephalopathy [G93.40]  No    Acute on chronic combined systolic and diastolic congestive heart failure [I50.43]  Yes    Anemia of chronic disease [D63.8]  Yes     Chronic    Acid reflux [K21.9]  Yes    Hyperlipidemia [E78.5]  Yes     Chronic    NSTEMI (non-ST elevated myocardial infarction) [I21.4]  Yes    History of CVA (cerebrovascular accident) [Z86.73]  Not Applicable     Chronic    Type 2 diabetes mellitus, uncontrolled [E11.65]  Yes     Chronic    CAD s/p PCI [I25.10]  Yes     Chronic    Tobacco abuse [Z72.0]  Yes     Chronic    Unstable angina [I20.0]  Yes    COPD (chronic obstructive pulmonary disease) [J44.9]  Yes     Chronic    Hypertension [I10]  Yes     Chronic    PVD (peripheral vascular disease) [I73.9]  Yes      Resolved Hospital Problems   No resolved problems to display.       Future Appointments   Date Time Provider Department Center   7/8/2019  9:40 AM Flaca Schofield MD [facility] HEM ONC [facility]   9/26/2019 10:00 AM ECHO, [facility] EKG [facility]   9/27/2019  1:00 PM Job Turcios MD [facility] CARDIO [facility]     Follow-up Information     Linda Mckee MD In 1 week.    Specialty:  General Practice  Contact information:  [address] [place] [address]  [address]  [place] [address]  [phone]             Fermin Healy MD In 1 week.    Specialties:  Cardiology, INTERVENTIONAL CARDIOLOGY  Contact information:  [address] [facility] [address]  [address]  [place] [address]  [phone]              "        I have seen and examined this patient face to face today. My clinical findings that support the need for the home health skilled services and home bound status are the following:  Weakness/numbness causing balance and gait disturbance due to Coronary Heart Disease and Weakness/Debility making it taxing to leave home.    Allergies:  Review of patient's allergies indicates:   Allergen Reactions    Morphine      Pt states, "I'm not allergic to morphine they gave me too much at [facility]. Instead of giving me 2mg she gave me 5mg and I was almost dead."     Codeine      Blisters vs sweling (pt unsure which one)    Iodinated contrast- oral and iv dye     Iodine containing multivitamin      Blisters vs swelling (pt unsure which one.)    Lidocaine        Diet: cardiac diet and diabetic diet: 2000 calorie    Activities: activity as tolerated    Nursing:   SN to complete comprehensive assessment including routine vital signs. Instruct on disease process and s/s of complications to report to MD. Review/verify medication list sent home with the patient at time of discharge  and instruct patient/caregiver as needed. Frequency may be adjusted depending on start of care date.    Notify MD if SBP > 160 or < 90; DBP > 90 or < 50; HR > 120 or < 50; Temp > 101; Other:         CONSULTS:    PT,for evaluation and  Treatment,PT can do OT as well.    MISCELLANEOUS CARE:  Routine Skin for Bedridden Patients: Instruct patient/caregiver to apply moisture barrier cream to all skin folds and wet areas in perineal area daily and after baths and all bowel movements.    WOUND CARE ORDERS  n/a      Medications: Review discharge medications with patient and family and provide education.      Current Discharge Medication List      CONTINUE these medications which have NOT CHANGED    Details   acetaminophen (TYLENOL) 325 MG tablet Take 2 tablets (650 mg total) by mouth every 4 (four) hours as needed for Pain or Temperature greater than " "(100).  Refills: 0      ADVAIR DISKUS 100-50 mcg/dose diskus inhaler Take 1 puff by mouth Twice daily.      alendronate (FOSAMAX) 70 MG tablet Take 70 mg by mouth.      amlodipine (NORVASC) 10 MG tablet Take 1 tablet (10 mg total) by mouth once daily.  Qty: 30 tablet, Refills: 3      ANORO ELLIPTA 62.5-25 mcg/actuation DsDv       aspirin (ECOTRIN) 81 MG EC tablet Take 81 mg by mouth once daily.        atorvastatin (LIPITOR) 80 MG tablet TK 1 T PO D  Refills: 3      BD ULTRA-FINE ANISHA PEN NEEDLE 32 gauge x 5/32" Ndle use for insulin up to FOUR TIMES DAILY  Refills: 0      BIDIL 20-37.5 mg Tab TK 1 T PO TID  Refills: 1      calcium-vitamin D (CALCIUM-VITAMIN D) 500 mg(1,250mg) -200 unit per tablet Take 1 tablet by mouth 2 (two) times daily with meals.        cloNIDine (CATAPRES) 0.1 MG tablet Take 1 tablet (0.1 mg total) by mouth 2 (two) times daily.  Qty: 60 tablet, Refills: 3      clopidogrel (PLAVIX) 75 mg tablet TK 1 T PO D  Refills: 1      ergocalciferol (ERGOCALCIFEROL) 50,000 unit Cap Take 50,000 Units by mouth every 7 days.        famotidine (PEPCID) 40 MG tablet       furosemide (LASIX) 40 MG tablet Take 1 tablet (40 mg total) by mouth 2 (two) times daily.  Qty: 60 tablet, Refills: 11      gabapentin (NEURONTIN) 300 MG capsule Refills: 3      hydrocodone-acetaminophen 5-325mg (NORCO) 5-325 mg per tablet Take 1 tablet by mouth 3 (three) times daily as needed.       imatinib (GLEEVEC) 400 MG Tab Take 1 tablet (400 mg total) by mouth once daily.  Qty: 30 tablet, Refills: 2    Associated Diagnoses: BCR/ABL1-positive chronic myeloid leukemia (CML) in remission      insulin glargine (LANTUS) 100 unit/mL injection Inject 18 Units into the skin every evening.      isosorbide mononitrate (IMDUR) 30 MG 24 hr tablet Take 1 tablet (30 mg total) by mouth once daily.  Qty: 30 tablet, Refills: 3      lisinopril (PRINIVIL,ZESTRIL) 40 MG tablet Take 1 tablet (40 mg total) by mouth once daily.  Qty: 30 tablet, Refills: 3    "   metoprolol tartrate (LOPRESSOR) 25 MG tablet TK 1 T PO BID  Refills: 1      nicotine (NICODERM CQ) 21 mg/24 hr Place 1 patch onto the skin once daily.  Qty: 28 patch, Refills: 11      nitroGLYCERIN (NITROSTAT) 0.4 MG SL tablet Place 1 tablet (0.4 mg total) under the tongue every 5 (five) minutes as needed for Chest pain.  Qty: 30 tablet, Refills: 0      prednisoLONE acetate (PRED FORTE) 1 % DrpS       ranitidine (ZANTAC) 300 MG tablet TK 1 T PO QHS  Refills: 1      ranolazine (RANEXA) 500 MG Tb12 Take 500 mg by mouth 2 (two) times daily.      tiotropium (SPIRIVA) 18 mcg inhalation capsule Inhale 18 mcg into the lungs once daily.        VENTOLIN HFA 90 mcg/actuation HFAA Inhale 2 puffs into the lungs every 4 to 6 hours as needed.             I certify that this patient is confined to her home and needs intermittent skilled nursing care, physical therapy and occupational therapy.

## 2019-07-03 NOTE — NURSING
Report given to Tracy HIDALGO. Pt awake in bed, confused. 2L O2 in use. Telemetry monitor in use, alarm set. Pt in no apparent distress.

## 2019-07-03 NOTE — NURSING
"Patient refused labs. Explained importance of lab draws. Pt started yelling "the doctor should've been done it." Will continue to monitor.  "

## 2019-07-03 NOTE — DISCHARGE SUMMARY
[facility] Hospital Medicine  Discharge Summary      Patient Name: Susan Howell  MRN: [id number]  Admission Date: 6/29/2019  Hospital Length of Stay: 4 days  Discharge Date and Time:  07/03/2019 10:50 AM  Attending Physician: Faraz Harris MD   Discharging Provider: Faraz Harris MD  Primary Care Provider: Linda Mckee MD      HPI:   Patient is intubated and sedated during my assessment. Hx taken from chart review.    74 yof with COPD on 2 L HOT, CAD, GERD, Chronic combined systolic diastolic heart failure, HTN, HLD, DMT2, Tobacco abuse, with recent admission for NSTEMI s/p [place] with PCI to Lcx presenting to ED with non radiating substernal chest discomfort that started after dinner last night (6/28) with associated nasuea. Denied any fever, dyspnea, cough, abdominal pain, back pain, emesis. She described her pain different from her prior NSTEMI chest pain. Nitro by EMS didn't get. Given GI cocktail, morphine and ativan which didn't help either. Patient became more tachypnea despite duonebs, placed for bipap. Given lasix for suspected CHF exacerbation. Subsequently intubated for acute on chronic hypercapnic respiratory failure. Admitted to ICU.    Procedure(s) (LRB):  Left heart cath (Left)  Percutaneous coronary intervention (N/A)  IVUS, Coronary  INSERTION, STENT, CORONARY ARTERY (N/A)      Hospital Course:   Presented to ED with atypical non radiating substernal CP after dinner. Stated to be different from her prior MI 2 weeks ago. Initially saturating well on RA (as per chart review). After receiving morphine, fluids, and lorazepam x 2 patient became more hypoxic and hypercarbic, despite bipap. Intubated and sedated on propofol. Admitted to ICU. Trop initially 0.2. EKG NSR inferolateral ST depression. Cardiology consulted. Repeat Trop 0.2 --> 20 --> >50. Last echo with EF 35% + moderate DD. Recently had a angiogram 6/14 with stent to Lcx. ?Stent thrombosis. Was started on  Lovenox BID therapeutic dosing while continuing BB, statin, Plavix and asa 81. Re-loaded on plavix. Repeat TTE with severely depressed EF. Lasix IV 40 mg bid started,cardiolgy was consulted,had  Cardiac Angiogram,  S/p [facility] 7/1 - SAM placed to mid LAD. Monitored in ICU overnight. Per cardiology, ok to transfer to telemetry,she was on ACS medications and remains stable on NC O 2 on floor,denis no chest pain or SOB,she has been discharged home with  and follow up with PCP and cardiology as out patient.     Consults:   Consults (From admission, onward)        Status Ordering Provider     Inpatient consult to Cardiology  Once     Provider:  Job Turcios MD    Acknowledged SHWETHA COLBERT     Inpatient consult to Critical Care Medicine  Once     Provider:  (Not yet assigned)    Completed JOSEMANUEL QIU     Inpatient consult to Pulmonology  Once     Provider:  (Not yet assigned)    Completed ERIN SILVA     Inpatient consult to Social Work  Once     Provider:  (Not yet assigned)    Acknowledged CHRIS BARFIELD     IP consult to case management  Once     Provider:  (Not yet assigned)    Acknowledged SHWETHA COLBERT          No new Assessment & Plan notes have been filed under this hospital service since the last note was generated.  Service: Hospital Medicine    Final Active Diagnoses:    Diagnosis Date Noted POA    PRINCIPAL PROBLEM:  Acute respiratory failure with hypoxia and hypercapnia [J96.01, J96.02] 06/29/2019 Yes    Acute encephalopathy [G93.40] 06/29/2019 No    Acute on chronic combined systolic and diastolic congestive heart failure [I50.43] 06/13/2019 Yes    Anemia of chronic disease [D63.8] 11/04/2014 Yes     Chronic    Acid reflux [K21.9] 11/04/2014 Yes    Hyperlipidemia [E78.5] 10/27/2014 Yes     Chronic    NSTEMI (non-ST elevated myocardial infarction) [I21.4]  Yes    History of CVA (cerebrovascular accident) [Z86.73] 08/19/2014 Not Applicable     Chronic    Type 2 diabetes  mellitus, uncontrolled [E11.65] 08/19/2014 Yes     Chronic    CAD s/p PCI [I25.10] 06/07/2014 Yes     Chronic    Tobacco abuse [Z72.0] 06/07/2014 Yes     Chronic    Unstable angina [I20.0] 06/06/2014 Yes    COPD (chronic obstructive pulmonary disease) [J44.9]  Yes     Chronic    Hypertension [I10]  Yes     Chronic    PVD (peripheral vascular disease) [I73.9]  Yes      Problems Resolved During this Admission:       Discharged Condition: stable    Disposition: Home-Health Care [facility]    Follow Up:  Follow-up Information     Linda Mckee MD In 1 week.    Specialty:  General Practice  Contact information:  [address] [place] [address] [address]  [address]  [place] [id number] [phone]             Fermin Healy MD In 1 week.    Specialties:  Cardiology, INTERVENTIONAL CARDIOLOGY  Contact information:  [address] [facility] [address]  [address]  [place] [id number] [phone]                 Patient Instructions:      Activity as tolerated       Significant Diagnostic Studies: Labs:   BMP:   Recent Labs   Lab 07/02/19  0206 07/03/19 0719   * 103    138   K 4.0 3.9    102   CO2 26 27   BUN 32* 24*   CREATININE 0.8 0.7   CALCIUM 8.6* 8.7   , CMP   Recent Labs   Lab 07/02/19  0206 07/03/19 0719    138   K 4.0 3.9    102   CO2 26 27   * 103   BUN 32* 24*   CREATININE 0.8 0.7   CALCIUM 8.6* 8.7   PROT 6.0 6.0   ALBUMIN 3.0* 3.0*   BILITOT 0.4 0.6   ALKPHOS 61 59   * 51*   ALT 47* 28   ANIONGAP 6* 9   ESTGFRAFRICA >60 >60   EGFRNONAA >60 >60   , CBC   Recent Labs   Lab 07/02/19  0206 07/03/19 0719   WBC 13.26* 11.17   HGB 9.5* 9.3*   HCT 29.3* 29.7*    212   , Lipid Panel   Lab Results   Component Value Date    CHOL 179 03/05/2016    HDL 44 03/05/2016    LDLCALC 120.4 03/05/2016    TRIG 73 03/05/2016    CHOLHDL 24.6 03/05/2016    and Troponin   Recent Labs   Lab 07/01/19  0415   TROPONINI >50.000*     Radiology: X-Ray: CXR: X-Ray Chest 1 View (CXR):   Results for orders placed or  performed during the hospital encounter of 06/29/19   X-Ray Chest 1 View    Narrative    EXAMINATION:  XR CHEST 1 VIEW    CLINICAL HISTORY:  et placement;.    TECHNIQUE:  Single frontal portable view of the chest was performed.    COMPARISON:  June 29, 2019 at 05:17    FINDINGS:  Support devices: ET tube has been advanced slightly and has its tip at T5.  NG tube remains.    Cardiac silhouette is not enlarged.  Pulmonary vascularity is not increased.  Mild diffusely increased interstitial opacification remains bilaterally with some interval clearing, particularly at the site of the more focal opacification in the right lower lung.No large pleural effusion or pneumothorax.Visualized osseous structures are stable with a significant thoracic dextroscoliosis.  There are atherosclerotic calcifications of the aorta.  There may be coronary artery stent material.      Impression    As above.      Electronically signed by: Leni Sanford MD  Date:    06/29/2019  Time:    14:25    and X-Ray Chest PA and Lateral (CXR): No results found for this visit on 06/29/19.  Cardiac Graphics: Echocardiogram:   2D echo with color flow doppler:   Results for orders placed or performed during the hospital encounter of 03/03/16   2D echo with color flow doppler   Result Value Ref Range    QEF 40 (A) 55 - 65    Mitral Valve Regurgitation TRIVIAL     Diastolic Dysfunction Yes (A)     Est. PA Systolic Pressure 30.47     Pericardial Effusion NONE     Mitral Valve Mobility NORMAL     Tricuspid Valve Regurgitation TRIVIAL     Narrative    TEST DESCRIPTION   Technical Quality: This is a technically adequate study.     Aorta: The aortic root is normal in size, measuring 2.0 cm at sinotubular junction and 2.5 cm at Sinuses of Valsalva. The proximal ascending aorta is normal in size, measuring 1.8 cm across.     Left Atrium: The left atrial volume index is normal, measuring 22.70 cc/m2.     Left Ventricle: The left ventricle is normal in size, with  an end-diastolic diameter of 5.0 cm, and an end-systolic diameter of 3.7 cm. Wall thickness is mildly increased, with the septum measuring 1.2 cm and the posterior wall measuring 1.1 cm across.   Relative wall thickness was increased at 0.44, and the LV mass index was increased at 149.1 g/m2 consistent with mild concentric left ventricular hypertrophy. Global left ventricular systolic function appears mildly to moderately depressed. Visually   estimated ejection fraction is 40-45%. The LV Doppler derived stroke volume equals 63.0 ccs.   The E/e'(lat) is 12, consistent with significant diastolic dysfunction.     Right Atrium: The right atrium is normal in size, measuring 4.1 cm in length and 2.7 cm in width in the apical view.     Right Ventricle: The right ventricle is normal in size measuring 2.9 cm at the base in the apical right ventricle-focused view. Global right ventricular systolic function appears normal. Tricuspid annular plane systolic excursion (TAPSE) is 1.6 cm. The   estimated PA systolic pressure is 30 mmHg.     Aortic Valve:  The aortic valve is mildly sclerotic with normal leaflet mobility. The aortic valve is tri-leaflet in structure.     Mitral Valve:  The mitral valve is normal in structure with normal leaflet mobility. There is trivial mitral regurgitation.     Tricuspid Valve:  The tricuspid valve is normal in structure with normal leaflet mobility. There is trivial tricuspid regurgitation.     Pulmonary Valve:  The pulmonic valve is not well seen.     Pericardium: There is no evidence of pericardial effusion.      IVC: IVC is enlarged but collapses > 50% with a sniff, suggesting intermediate right atrial pressure of 8 mmHg.     Intracavitary: There is no evidence of intracavity mass, thrombi, or vegetation.         CONCLUSIONS     1 - Mildly to moderately depressed left ventricular systolic function (EF 40-45%).  Mild global hypokinesis..     2 - Concentric hypertrophy.     3 - Left ventricular  diastolic dysfunction.     4 - Trivial mitral regurgitation.     5 - Trivial tricuspid regurgitation.     6 - The estimated PA systolic pressure is 30 mmHg.         This document has been electronically    SIGNED BY: Fermin Healy MD On: 03/04/2016 15:03    and Transthoracic echo (TTE) complete (Cupid Only):   Results for orders placed or performed during the hospital encounter of 06/29/19   Transthoracic echo (TTE) 2D with Color Flow   Result Value Ref Range    BSA 1.66 m2    TDI SEPTAL 0.04 m/s    LV LATERAL E/E' RATIO 21.00 m/s    LV SEPTAL E/E' RATIO 21.00 m/s    LA WIDTH 4.94 cm    AORTIC VALVE CUSP SEPERATION 1.63 cm    TDI LATERAL 0.04 m/s    PV PEAK VELOCITY 0.47 cm/s    LVIDD 5.12 3.5 - 6.0 cm    IVS 1.47 (A) 0.6 - 1.1 cm    PW 1.52 (A) 0.6 - 1.1 cm    Ao root annulus 2.67 cm    LVIDS 4.94 (A) 2.1 - 4.0 cm    FS 4 28 - 44 %    LA volume 67.64 cm3    Sinus 2.75 cm    STJ 2.25 cm    Ascending aorta 2.28 cm    LV mass 332.73 g    LA size 3.16 cm    RVDD 3.03 cm    TAPSE 1.74 cm    RV S' 13.39 cm/s    Left Ventricle Relative Wall Thickness 0.59 cm    AV mean gradient 3 mmHg    AV valve area 1.44 cm2    AV Velocity Ratio 0.40     AV index (prosthetic) 0.46     E/A ratio 1.11     Mean e' 0.04 m/s    E wave decelartion time 133.65 msec    IVRT 0.13 msec    Pulm vein S/D ratio 1.54     LVOT diameter 2.00 cm    LVOT area 3.1 cm2    LVOT peak elias 0.41 m/s    LVOT peak VTI 9.12 cm    Ao peak elias 1.03 m/s    Ao VTI 19.91 cm    LVOT stroke volume 28.64 cm3    AV peak gradient 4 mmHg    E/E' ratio 21.00 m/s    MV Peak E Elias 0.84 m/s    TR Max Elias 2.38 m/s    MV Peak A Elias 0.76 m/s    PV Peak S Elias 0.37 m/s    PV Peak D Elias 0.24 m/s    LV Systolic Volume 115.06 mL    LV Systolic Volume Index 69.7 mL/m2    LV Diastolic Volume 125.08 mL    LV Diastolic Volume Index 75.81 mL/m2    LA Volume Index 41.0 mL/m2    LV Mass Index 202 g/m2    RA Major Axis 4.66 cm    Left Atrium Minor Axis 4.86 cm    Left Atrium Major Axis  "5.36 cm    Triscuspid Valve Regurgitation Peak Gradient 23 mmHg    RA Width 3.61 cm    Right Atrial Pressure (from IVC) 15 mmHg    TV rest pulmonary artery pressure 38 mmHg    Narrative    · Severely decreased left ventricular systolic function. The estimated   ejection fraction is 15%  · Concentric left ventricular hypertrophy.  · Grade II (moderate) left ventricular diastolic dysfunction consistent   with pseudonormalization.  · Normal right ventricular systolic function.  · Moderate left atrial enlargement.  · Mild-to-moderate mitral regurgitation.  · Mild tricuspid regurgitation.  · The estimated PA systolic pressure is 38 mm Hg        Angiography: [facility]     Pending Diagnostic Studies:     None         Medications:  Reconciled Home Medications:      Medication List      CONTINUE taking these medications    acetaminophen 325 MG tablet  Commonly known as:  TYLENOL  Take 2 tablets (650 mg total) by mouth every 4 (four) hours as needed for Pain or Temperature greater than (100).     ADVAIR DISKUS 100-50 mcg/dose diskus inhaler  Generic drug:  fluticasone-salmeterol 100-50 mcg/dose  Take 1 puff by mouth Twice daily.     alendronate 70 MG tablet  Commonly known as:  FOSAMAX  Take 70 mg by mouth.     amLODIPine 10 MG tablet  Commonly known as:  NORVASC  Take 1 tablet (10 mg total) by mouth once daily.     ANORO ELLIPTA 62.5-25 mcg/actuation Dsdv  Generic drug:  umeclidinium-vilanterol     aspirin 81 MG EC tablet  Commonly known as:  ECOTRIN  Take 81 mg by mouth once daily.     atorvastatin 80 MG tablet  Commonly known as:  LIPITOR  TK 1 T PO D     BD ULTRA-FINE ANISHA PEN NEEDLE 32 gauge x 5/32" Ndle  Generic drug:  pen needle, diabetic  use for insulin up to FOUR TIMES DAILY     BIDIL 20-37.5 mg Tab  Generic drug:  isosorbide-hydrALAZINE 20-37.5 mg  TK 1 T PO TID     CALCIUM 500 + D 500 mg(1,250mg) -200 unit per tablet  Generic drug:  calcium-vitamin D3  Take 1 tablet by mouth 2 (two) times daily with meals.     cloNIDine " 0.1 MG tablet  Commonly known as:  CATAPRES  Take 1 tablet (0.1 mg total) by mouth 2 (two) times daily.     clopidogrel 75 mg tablet  Commonly known as:  PLAVIX  TK 1 T PO D     ergocalciferol 50,000 unit Cap  Commonly known as:  ERGOCALCIFEROL  Take 50,000 Units by mouth every 7 days.     famotidine 40 MG tablet  Commonly known as:  PEPCID     furosemide 40 MG tablet  Commonly known as:  LASIX  Take 1 tablet (40 mg total) by mouth 2 (two) times daily.     gabapentin 300 MG capsule  Commonly known as:  NEURONTIN     HYDROcodone-acetaminophen 5-325 mg per tablet  Commonly known as:  NORCO  Take 1 tablet by mouth 3 (three) times daily as needed.     imatinib 400 MG Tab  Commonly known as:  GLEEVEC  Take 1 tablet (400 mg total) by mouth once daily.     insulin glargine 100 unit/mL injection  Commonly known as:  LANTUS  Inject 18 Units into the skin every evening.     isosorbide mononitrate 30 MG 24 hr tablet  Commonly known as:  IMDUR  Take 1 tablet (30 mg total) by mouth once daily.     lisinopril 40 MG tablet  Commonly known as:  PRINIVIL,ZESTRIL  Take 1 tablet (40 mg total) by mouth once daily.     metoprolol tartrate 25 MG tablet  Commonly known as:  LOPRESSOR  TK 1 T PO BID     nicotine 21 mg/24 hr  Commonly known as:  NICODERM CQ  Place 1 patch onto the skin once daily.     nitroGLYCERIN 0.4 MG SL tablet  Commonly known as:  NITROSTAT  Place 1 tablet (0.4 mg total) under the tongue every 5 (five) minutes as needed for Chest pain.     prednisoLONE acetate 1 % Drps  Commonly known as:  PRED FORTE     ranitidine 300 MG tablet  Commonly known as:  ZANTAC  TK 1 T PO QHS     ranolazine 500 MG Tb12  Commonly known as:  RANEXA  Take 500 mg by mouth 2 (two) times daily.     tiotropium 18 mcg inhalation capsule  Commonly known as:  SPIRIVA  Inhale 18 mcg into the lungs once daily.     VENTOLIN HFA 90 mcg/actuation inhaler  Generic drug:  albuterol  Inhale 2 puffs into the lungs every 4 to 6 hours as needed.             Indwelling Lines/Drains at time of discharge:   Lines/Drains/Airways     Drain                 Urethral Catheter 06/29/19 0520 Latex 18 Fr. 4 days                Time spent on the discharge of patient: over 30  minutes  Patient was seen and examined on the date of discharge and determined to be suitable for discharge.         Faraz Harris MD  Department of Hospital Medicine  [facility]

## 2019-07-03 NOTE — PLAN OF CARE
Problem: Fall Injury Risk  Goal: Absence of Fall and Fall-Related Injury    Intervention: Identify and Manage Contributors to Fall Injury Risk     07/03/19 1247   Manage Acute Allergic Reaction   Medication Review/Management medications reviewed   Identify and Manage Contributors to Fall Injury Risk   Self-Care Promotion BADL personal objects within reach;BADL personal routines maintained     Intervention: Promote Injury-Free Environment     07/03/19 1247   Optimize [place] and Functional Mobility   Environmental Safety Modification assistive device/personal items within reach;clutter free environment maintained;room near unit station   Optimize Balance and Safe Activity   Safety Promotion/Fall Prevention assistive device/personal item within reach;bed alarm set;medications reviewed;side rails raised x 2;instructed to call staff for mobility         Problem: Infection  Goal: Infection Symptom Resolution    Intervention: Prevent or Manage Infection     07/03/19 1247   Prevent or Manage Infection   Fever Reduction/Comfort Measures medication administered   Infection Management aseptic technique maintained         Problem: Adult Inpatient Plan of Care  Goal: Plan of Care Review     07/03/19 1247   Plan of Care Review   Plan of Care Reviewed With patient       Problem: Diabetes Comorbidity  Goal: Blood Glucose Level Within Desired Range    Intervention: Maintain Glycemic Control     07/03/19 1247   Monitor and Manage Ketoacidosis   Glycemic Management blood glucose monitoring;supplemental insulin given      07/03/19 1247   Monitor and Manage Ketoacidosis   Glycemic Management blood glucose monitoring;supplemental insulin given         Problem: Skin Injury Risk Increased  Goal: Skin Health and Integrity    Intervention: Optimize Skin Protection     07/03/19 1247   Prevent Additional Skin Injury   Head of Bed (HOB) HOB elevated   Pressure Reduction Techniques weight shift assistance provided   Monitor and Manage  Hypervolemia   Skin Protection adhesive use limited     Intervention: Promote and Optimize Oral Intake     07/03/19 1247   Monitor and Manage Anemia   Oral Nutrition Promotion rest periods promoted;social interaction promoted

## 2019-07-05 PROBLEM — R06.02 SHORTNESS OF BREATH: Status: ACTIVE | Noted: 2019-01-01

## 2019-07-05 PROBLEM — I50.23 ACUTE ON CHRONIC SYSTOLIC CONGESTIVE HEART FAILURE: Status: ACTIVE | Noted: 2019-01-01

## 2019-07-05 NOTE — ED PROVIDER NOTES
"Encounter Date: 7/4/2019    SCRIBE #1 NOTE: I, Sabrina Tena, am scribing for, and in the presence of,  Dr. Win . I have scribed the following portions of the note - Other sections scribed: HPI, ROS, PE .       History     Chief Complaint   Patient presents with    Shortness of Breath     Pt seen at [facility] today for SOB. Pt states she went to [facility] because her O2 tank ran out. Pt reports she was discharged from this facility yesterday. she states she was admitted for an MI and had a stent placed. Pt was sent from [facility] for EKG changes. At this time pt reports soreness under the left breast and SOB. She denies any other symptoms     This is a 74 y.o. Female PMHx MI, cardiac stents, CAD who presents to the ED complaining of shortness of breath for several days. The patient was seen today at [facility] for shortness of breath. She was sent here for concern for EKG changes including new T-wave inversions in V4 and V5.   She reports left lateral chest pain. The patient had a recent MI.  At [facility], Her oxygen tank was reportedly empty and not working.       The history is provided by the patient. No  was used.     Review of patient's allergies indicates:   Allergen Reactions    Morphine      Pt states, "I'm not allergic to morphine they gave me too much at [facility]. Instead of giving me 2mg she gave me 5mg and I was almost dead."     Codeine      Blisters vs sweling (pt unsure which one)    Iodinated contrast- oral and iv dye     Iodine containing multivitamin      Blisters vs swelling (pt unsure which one.)    Lidocaine      Past Medical History:   Diagnosis Date    Asthma     Cancer 10/10/2014    Bone cancer     CHF (congestive heart failure)     Cigarette smoker     COPD (chronic obstructive pulmonary disease)     Diabetes mellitus     Diabetes mellitus type II     Emphysema of lung     [place] (hard of hearing)     Hypercholesteremia     " Hypertension     Myocardial infarct 2013    On home oxygen therapy     Palpitations     Pneumonia 2019    PVD (peripheral vascular disease)     Seizures     Stroke     Unsteady gait     uses a cane & walker     Past Surgical History:   Procedure Laterality Date    APPENDECTOMY       SECTION      CHOLECYSTECTOMY      HYSTERECTOMY      INSERTION, STENT, CORONARY ARTERY N/A 2019    [facility] CATH LAB    IVUS, Coronary  2019    Performed by [facility] CATH LAB    Left heart cath Left 2019    Performed by Job Turcios [facility] at [facility] CATH LAB    Left heart cath Left 2019    Performed by [facility] CATH LAB    Percutaneous coronary intervention N/A 2019    Performed by [facility] CATH LAB    VASCULAR SURGERY      stent in L leg; unable to place in R leg d/t blockage     Family History   Problem Relation Age of Onset    Diabetes Mother     Asthma Mother     Cancer Father     Breast cancer Sister     Colon cancer Neg Hx     Ovarian cancer Neg Hx      Social History     Tobacco Use    Smoking status: Current Every Day Smoker     Packs/day: 0.50     Years: 56.00     Pack years: 28.00     Types: Cigarettes    Smokeless tobacco: Current User   Substance Use Topics    Alcohol use: No    Drug use: No     Review of Systems   Constitutional: Negative for fever.   HENT: Negative for sore throat.    Respiratory: Positive for cough and shortness of breath. Negative for apnea.    Cardiovascular: Positive for chest pain (left lateral).   Gastrointestinal: Negative for abdominal pain and nausea.   Genitourinary: Negative for dysuria.   Musculoskeletal: Negative for back pain.   Skin: Negative for rash.   Neurological: Negative for weakness.   Hematological: Does not bruise/bleed easily.   Psychiatric/Behavioral: Negative for confusion.       Physical Exam     Initial Vitals  [07/04/19 2256]   BP Pulse Resp Temp SpO2   (!) 156/76 96 (!) 24 98.3 °F (36.8 °C) 98 %      MAP       --         Physical Exam    Nursing note and vitals reviewed.  Constitutional: She appears well-developed and well-nourished. She is not diaphoretic. No distress.   HENT:   Head: Normocephalic and atraumatic.   Eyes: Conjunctivae and EOM are normal. Pupils are equal, round, and reactive to light.   Neck: Normal range of motion. Neck supple.   Cardiovascular: Normal rate, regular rhythm, normal heart sounds and intact distal pulses. Exam reveals no gallop and no friction rub.    No murmur heard.  Pulmonary/Chest: Breath sounds normal. No stridor. No respiratory distress. She has no wheezes. She has no rhonchi. She has no rales. She exhibits tenderness (to left lateral rib).   Abdominal: Soft. There is no tenderness.   Musculoskeletal: Normal range of motion. She exhibits no edema or tenderness.   Neurological: She is alert and oriented to person, place, and time. She has normal strength. No cranial nerve deficit.   Skin: Skin is warm and dry. No rash noted.   Psychiatric: She has a normal mood and affect. Her behavior is normal.         ED Course   Procedures  Labs Reviewed   CBC W/ AUTO DIFFERENTIAL - Abnormal; Notable for the following components:       Result Value    RBC 3.99 (*)     Hemoglobin 10.2 (*)     Hematocrit 31.6 (*)     Mean Corpuscular Volume 79 (*)     Mean Corpuscular Hemoglobin 25.6 (*)     RDW 21.2 (*)     Gran # (ANC) 8.1 (*)     Lymph # 0.7 (*)     Gran% 89.0 (*)     Lymph% 7.2 (*)     Mono% 3.3 (*)     All other components within normal limits   COMPREHENSIVE METABOLIC PANEL - Abnormal; Notable for the following components:    Potassium 3.4 (*)     Glucose 226 (*)     All other components within normal limits   TROPONIN I - Abnormal; Notable for the following components:    Troponin I 23.141 (*)     All other components within normal limits   B-TYPE NATRIURETIC PEPTIDE - Abnormal; Notable for  the following components:    BNP 2,656 (*)     All other components within normal limits   D DIMER, QUANTITATIVE - Abnormal; Notable for the following components:    D-Dimer 1.50 (*)     All other components within normal limits   TROPONIN I - Abnormal; Notable for the following components:    Troponin I 18.072 (*)     All other components within normal limits   POCT GLUCOSE MONITORING CONTINUOUS        ECG Results          EKG 12-lead (In process)  Result time 07/05/19 06:07:16    In process by Interface, Lab In [facility] (07/05/19 06:07:16)                 Narrative:    Test Reason : R42,    Vent. Rate : 092 BPM     Atrial Rate : 092 BPM     P-R Int : 118 ms          QRS Dur : 100 ms      QT Int : 422 ms       P-R-T Axes : 059 069 223 degrees     QTc Int : 521 ms    Sinus rhythm with Premature atrial complexes  LVH with repolarization abnormality  Abnormal ECG  When compared with ECG of 04-JUL-2019 23:53,  No significant change was found    Referred By: [email or web address]   SELF           Confirmed By:                   In process by Interface, Lab In [facility] (07/05/19 06:05:57)                 Narrative:    Test Reason : R42,    Vent. Rate : 092 BPM     Atrial Rate : 092 BPM     P-R Int : 118 ms          QRS Dur : 100 ms      QT Int : 422 ms       P-R-T Axes : 059 069 223 degrees     QTc Int : 521 ms    Sinus rhythm with Premature atrial complexes  LVH with repolarization abnormality  Abnormal ECG  When compared with ECG of 05-JUL-2019 02:39,  No significant change was found    Referred By: [email or web address]   SELF           Confirmed By:                              EKG 12-lead (In process)  Result time 07/05/19 06:07:14    In process by Interface, Lab In [facility] (07/05/19 06:07:14)                 Narrative:    Test Reason : R07.9,    Vent. Rate : 093 BPM     Atrial Rate : 093 BPM     P-R Int : 122 ms          QRS Dur : 102 ms      QT Int : 398 ms       P-R-T Axes : 043 065 202 degrees     QTc Int : 494 ms    Sinus rhythm with  Premature atrial complexes  LVH with repolarization abnormality  Prolonged QT  Abnormal ECG  When compared with ECG of 01-JUL-2019 10:20,  Significant changes have occurred    Referred By: [email or web address]   SELF           Confirmed By:                   In process by Interface, Lab In [facility] (07/05/19 06:01:32)                 Narrative:    Test Reason : R07.9,    Vent. Rate : 093 BPM     Atrial Rate : 093 BPM     P-R Int : 122 ms          QRS Dur : 102 ms      QT Int : 398 ms       P-R-T Axes : 043 065 202 degrees     QTc Int : 494 ms    Sinus rhythm with Premature atrial complexes  LVH with repolarization abnormality  Prolonged QT  Abnormal ECG  When compared with ECG of 01-JUL-2019 10:20,  Significant changes have occurred    Referred By: [email or web address]   SELF           Confirmed By:                             Imaging Results          X-Ray Chest AP Portable (Final result)  Result time 07/04/19 23:36:25    Final result by Cahru Arndt MD (07/04/19 23:36:25)                 Impression:      No failure or pneumonia or nodule.  Scoliosis unchanged.      Electronically signed by: Charu Arndt  Date:    07/04/2019  Time:    23:36             Narrative:    EXAMINATION:  XR CHEST AP PORTABLE    CLINICAL HISTORY:  Chest Pain;    TECHNIQUE:  Single frontal view of the chest was performed.    COMPARISON:  03/03/2016 chest film    FINDINGS:  Single AP portable view at 23:23.    Heart is mildly enlarged unchanged.  Main pulmonary artery appears enlarged suggesting underlying pulmonary artery hypertension unchanged.  There is thoracic scoliosis convex to the right unchanged.  No interstitial edema consolidation or nodule.  The hilar shadows are probably normal.  Trachea appears normal.  No abdominal free air.                                 Medical Decision Making:   Initial Assessment:   74-year-old female with history of MI, recently admitted here for NSTEMI.  Troponin 3 days ago noted to be greater than 50.  Continued ongoing  shortness of breath and chest pain at [facility].  Some concern for new T-wave inversions in V4 and V5.  Troponin there was 15.  Repeat labs including troponin here was noted to be 23.  Patient with some left lateral chest pain, though somewhat atypical for ACS and reportedly different feeling than her prior heart attacks.  However, given some new EKG changes, a troponin that went from 15-23 between the outside hospital in here, I have some concern for possible ACS.  We will conservatively admit her for NSTEMI and get cardiology consult.  Patient without active chest pain at the time of admission.            Scribe Attestation:   Scribe #1: I performed the above scribed service and the documentation accurately describes the services I performed. I attest to the accuracy of the note.               Clinical Impression:       ICD-10-CM ICD-9-CM   1. Shortness of breath R06.02 786.05   2. Chest pain R07.9 786.50   3. NSTEMI (non-ST elevated myocardial infarction) I21.4 410.70   4. Dizziness R42 780.4         Disposition:   Disposition: Admitted  Condition: Stable                        Armen Barnes MD  07/05/19 0732

## 2019-07-05 NOTE — HPI
Susan Howell is a 74 y.o. female that (in part)  has a past medical history of Asthma, Cancer, CHF (congestive heart failure), Cigarette smoker, COPD (chronic obstructive pulmonary disease), Diabetes mellitus, Diabetes mellitus type II, Emphysema of lung, [place] (hard of hearing), Hypercholesteremia, Hypertension, Myocardial infarct, On home oxygen therapy, Palpitations, Pneumonia, PVD (peripheral vascular disease), Seizures, Stroke, and Unsteady gait.  has a past surgical history that includes  section; Cholecystectomy; Hysterectomy; Appendectomy; Vascular surgery; Left heart catheterization (Left, 2019); Left heart catheterization (Left, 2019); and Coronary stent placement (N/A, 2019). Presents to [facility] Emergency Department complaining of shortness of breath.  Initially the patient was seen at [facility] today for shortness of breath.  It was determined that her O2 tank ran out.  EKG, routine laboratory studies and cardiac enzymes were also obtained as part of the routine workup.  She had a markedly elevated troponin level.  Of note she underwent percutaneous intervention and had a stent placed in the left circumflex artery approximately 1 and half weeks ago.  She was subsequently seen began for severe shortness of breath thought to be due to CHF which progressed to respiratory failure requiring intubation.  She was discharged from this facility yesterday.  The transferring facility center here for further evaluation due to the elevated troponin and suspected EKG changes. At this time pt reports soreness under the left breast and SOB. She denies any other symptoms.  No fever chills.  No cough, hemoptysis, palpitations, or syncope.  No paresthesias or unilateral weakness.    In the emergency department repeat troponin showed decreased from greater than 50 during the last hospital visit, to 23 at the transferring facility, and down to 18 while here.   EKG showed inverted T-waves consistent with recent MI.    Hospital medicine has been asked to admit for further evaluation and treatment.

## 2019-07-05 NOTE — PLAN OF CARE
Problem: Fall Injury Risk  Goal: Absence of Fall and Fall-Related Injury    Intervention: Identify and Manage Contributors to Fall Injury Risk     07/05/19 1212   Manage Acute Allergic Reaction   Medication Review/Management medications reviewed   Identify and Manage Contributors to Fall Injury Risk   Self-Care Promotion BADL personal objects within reach;BADL personal routines maintained;meal setup provided     Intervention: Promote Injury-Free Environment     07/05/19 1212   Optimize Kay and Functional Mobility   Environmental Safety Modification assistive device/personal items within reach;clutter free environment maintained;room near unit station   Optimize Balance and Safe Activity   Safety Promotion/Fall Prevention assistive device/personal item within reach;bed alarm set;medications reviewed;nonskid shoes/socks when out of bed;room near unit station;side rails raised x 2;instructed to call staff for mobility         Problem: Adult Inpatient Plan of Care  Goal: Plan of Care Review     07/05/19 1212   Plan of Care Review   Plan of Care Reviewed With patient       Problem: Diabetes Comorbidity  Goal: Blood Glucose Level Within Desired Range    Intervention: Maintain Glycemic Control     07/05/19 1212   Monitor and Manage Ketoacidosis   Glycemic Management blood glucose monitoring         Problem: Skin Injury Risk Increased  Goal: Skin Health and Integrity    Intervention: Promote and Optimize Oral Intake     07/05/19 1212   Monitor and Manage Anemia   Oral Nutrition Promotion rest periods promoted;social interaction promoted         Problem: Gas Exchange Impaired  Goal: Optimal Gas Exchange    Intervention: Optimize Oxygenation and Ventilation     07/05/19 1212   Prevent Additional Skin Injury   Head of Bed (HOB) HOB elevated   Support Asthma Symptom Control   Airway/Ventilation Management airway patency maintained         Problem: Adjustment to Illness (Acute Coronary Syndrome)  Goal: Optimal  Adaptation to Illness    Intervention: Support Adjustment to Life-Changing Event     07/05/19 1212   Promote Anxiety Reduction   Supportive Measures active listening utilized;verbalization of feelings encouraged   Family/Support System Care support provided;involvement promoted         Problem: Tissue Perfusion (Acute Coronary Syndrome)  Goal: Adequate Tissue Perfusion    Intervention: Optimize Cardiac Tissue Perfusion     07/05/19 1212   Support Asthma Symptom Control   Airway/Ventilation Management airway patency maintained;calming measures promoted   Promote Airway Secretion Clearance   Activity Management ambulated - L4;activity adjusted per tolerance   Monitor/Manage Chemotherapy Gastrointestinal Effects   Environmental Support rest periods encouraged

## 2019-07-05 NOTE — NURSING
Received report from GWEN Edwards. Patient lying in bed, NAD noted, safety precautions maintained. Will monitor.

## 2019-07-05 NOTE — ED TRIAGE NOTES
Pt presents to ED with c/o shortness of breath x one day. She states her home oxygen tank ran out so she went to urgent care due to feeling short of breath. Urgent care sent her to this ED to follow up on EKG changes. Pt recently discharged from hospital s/p MI with stents placed. Pt states she uses 2-3 L O2 NC at home. Pt c/o productive cough with clear sputum. Pt c/o LUQ pain 10/10 on the pain scale, denies radiation. The pain started this morning. Pt in no acute distress at this time.

## 2019-07-05 NOTE — PLAN OF CARE
RANDI mett with patient to discuss follow up appointments and the things she is responsible for to manage her care at home:  RANDI informed nurse Tracy that patient is ready for discharge from case management standpoint.   EDUCATION:  Things You are responsible For To Manage Your Care At Home:  1.    Getting your prescriptions filled   2.    Taking your medications as directed, DO NOT MISS ANY DOSES!  3.    Going to your follow-up doctor appointment. This is important because it  allow the doctor to monitor your progress and determine if  any changes need to made to your treatment plan.  Call [facility] Help at home number for new or repeated problems / symptoms   Call 911 for CP and / or SOB        07/05/19 1127   Final Note   Assessment Type Discharge Planning Assessment   Anticipated Discharge Disposition Home   Hospital Follow Up  Appt(s) scheduled? Yes   Discharge plans and expectations educations in teach back method with documentation complete? Yes   Right Care Referral Info   Post Acute Recommendation No Care

## 2019-07-05 NOTE — PROGRESS NOTES
[facility] CASE MANAGEMENT                  WRITTEN DISCHARGE INFORMATION      APPOINTMENTS AND RESOURCES TO HELP YOU MANAGE YOUR CARE AT HOME BASED ON YOUR PREFERENCES:  (If an appointment is not scheduled for you when you leave the hospital, call your doctor to schedule a follow up visit within a week)    Follow-up Information     Lio Orozco. Go on 7/10/2019.    Why:  Outpatient Services [facility] F/U with PCP at 9:30 am.   Contact information:  [address] [place] [address]. [phone]   [place] [address]   [phone]           Job Turcios MD. Go on 7/31/2019.    Specialty:  Cardiology  Why:  Outpatient Services [facility] F/U with Cardiologist at 9:45 am.   Contact information:  [address]  [address]  [place] [address]  [phone]                       Healthy Living Instructions to HELP MANAGE YOUR CARE AT HOME:  Things You are responsible for:  1.    Getting your prescriptions filled   2.    Taking your medications as directed, DO NOT MISS ANY DOSES!  3.    Following the diet and exercise recommended by your doctor  4.    Going to your follow-up doctor appointment. This is important because it allows the doctor to monitor your progress and determine if any changes need to made to your treatment plan.  5. If you have any questions about MANAGING YOUR CARE AT HOME Call the Nurse Care Line for 24/7 Assistance [phone]       Please answer any calls you may receive from [facility]. We want to continue to support you as you manage your healthcare needs. [facility] is happy to have the opportunity to serve you.      Thank you for choosing [facility] for your healthcare needs!  Your [facility] Case Management Team,    Lorelei Burden   II  Care Management  [phone]

## 2019-07-05 NOTE — PLAN OF CARE
Problem: Skin Injury Risk Increased  Goal: Skin Health and Integrity    Intervention: Optimize Skin Protection     07/05/19 0443   Prevent Additional Skin Injury   Head of Bed (HOB) HOB at 45 degrees   Pressure Reduction Devices heel offloading device utilized;positioning supports utilized;pressure-redistributing mattress utilized   Pressure Reduction Techniques frequent weight shift encouraged   Monitor and Manage Hypervolemia   Skin Protection incontinence pads utilized;adhesive use limited;electrode sites changed;skin sealant/moisture barrier applied     Intervention: Promote and Optimize Oral Intake     07/05/19 0443   Monitor and Manage Anemia   Oral Nutrition Promotion rest periods promoted;safe use of adaptive equipment encouraged;social interaction promoted      07/05/19 0443   Monitor and Manage Anemia   Oral Nutrition Promotion rest periods promoted;safe use of adaptive equipment encouraged;social interaction promoted         Problem: Gas Exchange Impaired  Goal: Optimal Gas Exchange    Intervention: Optimize Oxygenation and Ventilation     07/05/19 0443   Prevent Additional Skin Injury   Head of Bed (HOB) HOB at 45 degrees   Support Asthma Symptom Control   Airway/Ventilation Management airway patency maintained;calming measures promoted         Problem: Adjustment to Illness (Acute Coronary Syndrome)  Goal: Optimal Adaptation to Illness    Intervention: Support Adjustment to Life-Changing Event     07/05/19 0443   Promote Anxiety Reduction   Supportive Measures active listening utilized;decision-making supported;goal setting facilitated;positive reinforcement provided;problem solving facilitated;relaxation techniques promoted;self-responsibility promoted;verbalization of feelings encouraged   Family/Support System Care involvement promoted         Problem: Arrhythmia (Acute Coronary Syndrome)  Goal: Normalized Cardiac Rhythm    Intervention: Monitor and Manage Cardiac Rhythm Effects     07/05/19 0443    Manage Acute Allergic Reaction   Stabilization Measures respiratory support increased   Prevent or Manage Embolism   VTE Prevention/Management bleeding risk assessed;remove, assess skin and reapply sequential compression device;remove, assess skin and reapply compression stockings;ROM (active) performed         Problem: Pain (Acute Coronary Syndrome)  Goal: Absence of Cardiac-Related Pain    Intervention: Manage Cardiac Pain Symptoms     07/05/19 0443   Manage Acute Chest Pain   Chest Pain Intervention cardiac monitoring continued;cardiac monitor placed;12-lead ECG obtained;activity minimized;oxygen applied         Problem: Hemodynamic Instability (Acute Coronary Syndrome)  Goal: Effective Cardiac Pump Function    Intervention: Optimize Cardiac Function and Blood Flow     07/05/19 0443   Manage Acute Allergic Reaction   Stabilization Measures respiratory support increased         Problem: Tissue Perfusion (Acute Coronary Syndrome)  Goal: Adequate Tissue Perfusion    Intervention: Optimize Cardiac Tissue Perfusion     07/05/19 0443   Support Asthma Symptom Control   Airway/Ventilation Management airway patency maintained;calming measures promoted   Promote Airway Secretion Clearance   Activity Management activity clustered for rest period;bedrest maintained per order   Monitor/Manage Chemotherapy Gastrointestinal Effects   Environmental Support calm environment promoted;caregiver consistency promoted;environmental consistency promoted;personal routine supported;rest periods encouraged         Comments: REcceived from ED patient hoping to go home today. Went to clinic because oxygen tank empty. On NC 2L- Sats 100% ,removes O2 and O@ Sats remain 100%. Lungs clear to auscultation. Patient denies pain and/or discomfort at present time. Oriented to surroundings- instructed on nothing by mouth until  seen by cardiologist. Patient was not happy about being NPO, wants to go home.

## 2019-07-05 NOTE — PT/OT/SLP DISCHARGE
Physical Therapy Discharge Summary    Name: Susan Howell  MRN: [id number]   Principal Problem: Acute respiratory failure with hypoxia and hypercapnia     Patient Discharged from acute Physical Therapy on 7/3/19.  Please refer to prior PT notes for functional status.     Assessment:     Patient appropriate for care in another setting.    Objective:     GOALS:   Multidisciplinary Problems     Physical Therapy Goals        Problem: Physical Therapy Goal    Goal Priority Disciplines Outcome Goal Variances Interventions   Physical Therapy Goal     PT, PT/OT      Description:  Goals to be met by: 19     Patient will increase functional independence with mobility by performin. Supine to sit with Modified [place]  2. Rolling to Left and Right with Modified [place]  3. Sit to stand transfer with Modified [place]  4. Bed to chair transfer with Modified [place]   5. Gait  x150 feet with Modified [place] using Rolling Walker and O2  6. Lower extremity exercise program 3 sets x10 reps per handout, with independence                      Reasons for Discontinuation of Therapy Services  Transfer to alternate level of care.      Plan:     Patient Discharged to: Home with Home Health Service.    Ann Marie Coleman, PT  2019

## 2019-07-05 NOTE — CONSULTS
[facility]  Cardiology  Consult Note    Patient Name: Susan Howell  MRN: [id number]  Admission Date: 7/4/2019  Hospital Length of Stay: 0 days  Code Status: Full Code   Attending Provider: Faraz Harris MD   Consulting Provider: Job Turcios MD  Primary Care Physician: Linda Mckee MD  Principal Problem:Shortness of breath    Patient information was obtained from patient and ER records.     Consults  Subjective:     Chief Complaint:  CHF     HPI:   This is a 74 y.o. Female PMHx MI, cardiac stents, CAD who presents to the ED complaining of shortness of breath for several days. The patient was seen today at [facility] for shortness of breath. She was sent here for concern for EKG changes including new T-wave inversions in V4 and V5.   She reports left lateral chest pain. The patient had a recent MI.  At [facility], Her oxygen tank was reportedly empty and not working.     Denies CP or SOB currently  Reports she went back to the ER because her O2 tank was not working  EKG NSR LVH with reol  Troponin 23 trending down  BNP 2656 but CXR without volume overload    Known to me - recently admitted with NSTEMI and CHF    7/1/19 [facility] - EDP 18, LAD moderate diffuse disease with mid 70%, Cx long proximal 50% - mid stent patent with thrombus noted in mid portion, RCA known to be occluded - distal vessel fills with left to right collaterals     Findings/Key Components:  Dominance: Right  LM: MLI  LAD: prox eccentric 70%, mid stent patent, iFR 0.71  LCx: thrombus within 3.5x38 Resolute SAM (6/14/19) but T3 flow  RCA: , not injected, collateralized from LCA     PCI mid LCx AST:  Preop asa/plavix, intraop angiomax  POBA 3.5x20 at 20 tarah, excellent angio resule, T3 flow, 0% residual stenosis  IVUS with good stent expansion and apposition.  Distal vessel 8.3mm2, ost vessel 5.4mm2     PCI mid LAD  Predil 2.5x12  Stent 3.0x22 Resolute SAM 18 tarah  Excellent result, T3 flow, 0% residual  stenosis     Hemostasis:  RFA man comp     Impression:  NSTEMI  3V CAD, severe LV dysfxn  Successful PCI:  AST mid LCx s/p 3.5x20 POBA/IVUS  Mid LAD (+iFR) 3.0x22 Resolute SAM  RFA man comp     Plan:  Obs overnight  ASA 81mg qd indefinitely  Plavix 75mg qd for 1 year (thru 2020)  BBl/ACEi/Statin  Home in am  Follow up with Dr. Turcios after discharge     Echo   · Severely decreased left ventricular systolic function. The estimated ejection fraction is 15%  · Concentric left ventricular hypertrophy.  · Grade II (moderate) left ventricular diastolic dysfunction consistent with pseudonormalization.  · Normal right ventricular systolic function.  · Moderate left atrial enlargement.  · Mild-to-moderate mitral regurgitation.  · Mild tricuspid regurgitation.  The estimated PA systolic pressure is 38 mm Hg    Past Medical History:   Diagnosis Date    Asthma     Cancer 10/10/2014    Bone cancer     CHF (congestive heart failure)     Cigarette smoker     COPD (chronic obstructive pulmonary disease)     Diabetes mellitus     Diabetes mellitus type II     Emphysema of lung     Yu[place]iat[place] (hard of hearing)     Hypercholesteremia     Hypertension     Myocardial infarct 2013    On home oxygen therapy     Palpitations     Pneumonia 2019    PVD (peripheral vascular disease)     Seizures     Stroke     Unsteady gait     uses a cane & walker       Past Surgical History:   Procedure Laterality Date    APPENDECTOMY       SECTION      CHOLECYSTECTOMY      HYSTERECTOMY      INSERTION, STENT, CORONARY ARTERY N/A 2019    [facility]formed by [facility] CATH LAB    IVUS, Coronary  2019    [facility] at [facility] CATH LAB    Left heart cath Left 2019    Performed by Job [facility] at [facility] CATH LAB    Left heart cath Left 2019    Performed by [facility] at [facility] CATH LAB    Percutaneous coronary intervention N/A 2019     "[facility]    VASCULAR SURGERY      stent in L leg; unable to place in R leg d/t blockage       Review of patient's allergies indicates:   Allergen Reactions    Morphine      Pt states, "I'm not allergic to morphine they gave me too much at [facility]. Instead of giving me 2mg she gave me 5mg and I was almost dead."     Codeine      Blisters vs sweling (pt unsure which one)    Iodinated contrast- oral and iv dye     Iodine containing multivitamin      Blisters vs swelling (pt unsure which one.)    Lidocaine        No current facility-administered medications on file prior to encounter.      Current Outpatient Medications on File Prior to Encounter   Medication Sig    acetaminophen (TYLENOL) 325 MG tablet Take 2 tablets (650 mg total) by mouth every 4 (four) hours as needed for Pain or Temperature greater than (100).    ADVAIR DISKUS 100-50 mcg/dose diskus inhaler Take 1 puff by mouth Twice daily.    alendronate (FOSAMAX) 70 MG tablet Take 70 mg by mouth.    amlodipine (NORVASC) 10 MG tablet Take 1 tablet (10 mg total) by mouth once daily.    ANORO ELLIPTA 62.5-25 mcg/actuation DsDv     aspirin (ECOTRIN) 81 MG EC tablet Take 81 mg by mouth once daily.      atorvastatin (LIPITOR) 80 MG tablet TK 1 T PO D    BD ULTRA-FINE ANISHA PEN NEEDLE 32 gauge x 5/32" Ndle use for insulin up to FOUR TIMES DAILY    BIDIL 20-37.5 mg Tab TK 1 T PO TID    calcium-vitamin D (CALCIUM-VITAMIN D) 500 mg(1,250mg) -200 unit per tablet Take 1 tablet by mouth 2 (two) times daily with meals.      cloNIDine (CATAPRES) 0.1 MG tablet Take 1 tablet (0.1 mg total) by mouth 2 (two) times daily.    clopidogrel (PLAVIX) 75 mg tablet TK 1 T PO D    ergocalciferol (ERGOCALCIFEROL) 50,000 unit Cap Take 50,000 Units by mouth every 7 days.      famotidine (PEPCID) 40 MG tablet     furosemide (LASIX) 40 MG tablet Take 1 tablet (40 mg total) by mouth 2 (two) times daily.    gabapentin (NEURONTIN) " 300 MG capsule     hydrocodone-acetaminophen 5-325mg (NORCO) 5-325 mg per tablet Take 1 tablet by mouth 3 (three) times daily as needed.     imatinib (GLEEVEC) 400 MG Tab Take 1 tablet (400 mg total) by mouth once daily.    insulin glargine (LANTUS) 100 unit/mL injection Inject 18 Units into the skin every evening.    isosorbide mononitrate (IMDUR) 30 MG 24 hr tablet Take 1 tablet (30 mg total) by mouth once daily.    lisinopril (PRINIVIL,ZESTRIL) 40 MG tablet Take 1 tablet (40 mg total) by mouth once daily.    metoprolol tartrate (LOPRESSOR) 25 MG tablet TK 1 T PO BID    nicotine (NICODERM CQ) 21 mg/24 hr Place 1 patch onto the skin once daily.    nitroGLYCERIN (NITROSTAT) 0.4 MG SL tablet Place 1 tablet (0.4 mg total) under the tongue every 5 (five) minutes as needed for Chest pain.    prednisoLONE acetate (PRED FORTE) 1 % DrpS     ranitidine (ZANTAC) 300 MG tablet TK 1 T PO QHS    ranolazine (RANEXA) 500 MG Tb12 Take 500 mg by mouth 2 (two) times daily.    tiotropium (SPIRIVA) 18 mcg inhalation capsule Inhale 18 mcg into the lungs once daily.      VENTOLIN HFA 90 mcg/actuation HFAA Inhale 2 puffs into the lungs every 4 to 6 hours as needed.     Family History     Problem Relation (Age of Onset)    Asthma Mother    Breast cancer Sister    Cancer Father    Diabetes Mother        Tobacco Use    Smoking status: Current Every Day Smoker     Packs/day: 0.50     Years: 56.00     Pack years: 28.00     Types: Cigarettes    Smokeless tobacco: Current User   Substance and Sexual Activity    Alcohol use: No    Drug use: No    Sexual activity: Never     Review of Systems   Constitution: Negative for decreased appetite.   HENT: Negative for ear discharge.    Eyes: Negative for blurred vision.   Respiratory: Negative for hemoptysis.    Endocrine: Negative for polyphagia.   Hematologic/Lymphatic: Negative for adenopathy.   Skin: Negative for color change.   Musculoskeletal: Negative for joint swelling.    Genitourinary: Negative for bladder incontinence.   Neurological: Negative for brief paralysis.   Psychiatric/Behavioral: Negative for hallucinations.   Allergic/Immunologic: Negative for hives.     Objective:     Vital Signs (Most Recent):  Temp: 97.7 °F (36.5 °C) (07/05/19 0828)  Pulse: 102 (07/05/19 0840)  Resp: 18 (07/05/19 0840)  BP: (!) 144/80 (07/05/19 0828)  SpO2: 99 % (07/05/19 0840) Vital Signs (24h Range):  Temp:  [97.7 °F (36.5 °C)-98.6 °F (37 °C)] 97.7 °F (36.5 °C)  Pulse:  [] 102  Resp:  [18-24] 18  SpO2:  [97 %-100 %] 99 %  BP: (143-179)/(65-80) 144/80     Weight: 57.6 kg (126 lb 15.8 oz)  Body mass index is 21.8 kg/m².    SpO2: 99 %  O2 Device (Oxygen Therapy): nasal cannula    No intake or output data in the 24 hours ending 07/05/19 0914    Lines/Drains/Airways     Peripheral Intravenous Line                 Peripheral IV - Single Lumen 07/04/19 20 G Right Wrist 1 day                Physical Exam   Constitutional: She is oriented to person, place, and time. She appears well-developed and well-nourished.   HENT:   Head: Normocephalic and atraumatic.   Eyes: Pupils are equal, round, and reactive to light. Conjunctivae are normal.   Neck: Normal range of motion. Neck supple.   Cardiovascular: Normal rate, normal heart sounds and intact distal pulses.   Pulmonary/Chest: Effort normal and breath sounds normal.   Abdominal: Soft. Bowel sounds are normal.   Musculoskeletal: Normal range of motion.   Neurological: She is alert and oriented to person, place, and time.   Skin: Skin is warm and dry.       Significant Labs: All pertinent lab results from the last 24 hours have been reviewed.    Significant Imaging: Echocardiogram:   2D echo with color flow doppler:   Results for orders placed or performed during the hospital encounter of 03/03/16   2D echo with color flow doppler   Result Value Ref Range    QEF 40 (A) 55 - 65    Mitral Valve Regurgitation TRIVIAL     Diastolic Dysfunction Yes (A)      Est. PA Systolic Pressure 30.47     Pericardial Effusion NONE     Mitral Valve Mobility NORMAL     Tricuspid Valve Regurgitation TRIVIAL     Narrative    TEST DESCRIPTION   Technical Quality: This is a technically adequate study.     Aorta: The aortic root is normal in size, measuring 2.0 cm at sinotubular junction and 2.5 cm at Sinuses of Valsalva. The proximal ascending aorta is normal in size, measuring 1.8 cm across.     Left Atrium: The left atrial volume index is normal, measuring 22.70 cc/m2.     Left Ventricle: The left ventricle is normal in size, with an end-diastolic diameter of 5.0 cm, and an end-systolic diameter of 3.7 cm. Wall thickness is mildly increased, with the septum measuring 1.2 cm and the posterior wall measuring 1.1 cm across.   Relative wall thickness was increased at 0.44, and the LV mass index was increased at 149.1 g/m2 consistent with mild concentric left ventricular hypertrophy. Global left ventricular systolic function appears mildly to moderately depressed. Visually   estimated ejection fraction is 40-45%. The LV Doppler derived stroke volume equals 63.0 ccs.   The E/e'(lat) is 12, consistent with significant diastolic dysfunction.     Right Atrium: The right atrium is normal in size, measuring 4.1 cm in length and 2.7 cm in width in the apical view.     Right Ventricle: The right ventricle is normal in size measuring 2.9 cm at the base in the apical right ventricle-focused view. Global right ventricular systolic function appears normal. Tricuspid annular plane systolic excursion (TAPSE) is 1.6 cm. The   estimated PA systolic pressure is 30 mmHg.     Aortic Valve:  The aortic valve is mildly sclerotic with normal leaflet mobility. The aortic valve is tri-leaflet in structure.     Mitral Valve:  The mitral valve is normal in structure with normal leaflet mobility. There is trivial mitral regurgitation.     Tricuspid Valve:  The tricuspid valve is normal in structure with normal  leaflet mobility. There is trivial tricuspid regurgitation.     Pulmonary Valve:  The pulmonic valve is not well seen.     Pericardium: There is no evidence of pericardial effusion.      IVC: IVC is enlarged but collapses > 50% with a sniff, suggesting intermediate right atrial pressure of 8 mmHg.     Intracavitary: There is no evidence of intracavity mass, thrombi, or vegetation.         CONCLUSIONS     1 - Mildly to moderately depressed left ventricular systolic function (EF 40-45%).  Mild global hypokinesis..     2 - Concentric hypertrophy.     3 - Left ventricular diastolic dysfunction.     4 - Trivial mitral regurgitation.     5 - Trivial tricuspid regurgitation.     6 - The estimated PA systolic pressure is 30 mmHg.         This document has been electronically    SIGNED BY: Fermin Healy MD On: 03/04/2016 15:03     Assessment and Plan:     Acute on chronic systolic congestive heart failure  Elevated BNP but CXR relatively clear. Diuresis and afterload reduction as tolerated. Ok for d/c. Will f/u prn    CAD s/p PCI  Troponin trending down from recent NSTEMI, S/P POBA Cx and SAM LAD 7/1/19. Continue medical Rx    COPD (chronic obstructive pulmonary disease)  Per primary    Hypertension  stable        VTE Risk Mitigation (From admission, onward)        Ordered     IP VTE HIGH RISK PATIENT  Once      07/05/19 0356     Place ELEONORA hose  Until discontinued      07/05/19 0356     Place sequential compression device  Until discontinued      07/05/19 0356     enoxaparin injection 60 mg  Every 12 hours (non-standard times)      07/05/19 0224          Thank you for your consult. I will sign off. Please contact us if you have any additional questions.    Job Turcios MD  Cardiology   [facility]

## 2019-07-05 NOTE — H&P
[facility] Hospital Medicine  History & Physical    Patient Name: Susan Howell  MRN: [id number]  Admission Date: 2019  Attending Physician: Fermin Christian MD, MPH      PCP:     Linda Mckee MD    CC:     Chief Complaint   Patient presents with    Shortness of Breath     Pt seen at [facility] today for SOB. Pt states she went to [facility] because her O2 tank ran out. Pt reports she was discharged from this facility yesterday. she states she was admitted for an MI and had a stent placed. Pt was sent from [facility] for EKG changes. At this time pt reports soreness under the left breast and SOB. She denies any other symptoms       HISTORY OF PRESENT ILLNESS:     Susan Howell is a 74 y.o. female that (in part)  has a past medical history of Asthma, Cancer, CHF (congestive heart failure), Cigarette smoker, COPD (chronic obstructive pulmonary disease), Diabetes mellitus, Diabetes mellitus type II, Emphysema of lung, [place] (hard of hearing), Hypercholesteremia, Hypertension, Myocardial infarct, On home oxygen therapy, Palpitations, Pneumonia, PVD (peripheral vascular disease), Seizures, Stroke, and Unsteady gait.  has a past surgical history that includes  section; Cholecystectomy; Hysterectomy; Appendectomy; Vascular surgery; Left heart catheterization (Left, 2019); Left heart catheterization (Left, 2019); and Coronary stent placement (N/A, 2019). Presents to [facility] Emergency Department complaining of shortness of breath.  Initially the patient was seen at [facility] today for shortness of breath.  It was determined that her O2 tank ran out.  EKG, routine laboratory studies and cardiac enzymes were also obtained as part of the routine workup.  She had a markedly elevated troponin level.  Of note she underwent percutaneous intervention and had a stent placed in the left circumflex artery approximately 1 and half weeks ago.   She was subsequently seen began for severe shortness of breath thought to be due to CHF which progressed to respiratory failure requiring intubation.  She was discharged from this facility yesterday.  The transferring facility center here for further evaluation due to the elevated troponin and suspected EKG changes. At this time pt reports soreness under the left breast and SOB. She denies any other symptoms.  No fever chills.  No cough, hemoptysis, palpitations, or syncope.  No paresthesias or unilateral weakness.    In the emergency department repeat troponin showed decreased from greater than 50 during the last hospital visit, to 23 at the transferring facility, and down to 18 while here.  EKG showed inverted T-waves consistent with recent MI.    Hospital medicine has been asked to admit for further evaluation and treatment.       REVIEW OF SYSTEMS:     -- Constitutional: No fever or chills.  -- Eyes: No visual changes, diplopia, pain, tearing, blind spots, or discharge.   -- Ears, nose, mouth, throat, and face: No congestion, sore throat, epistaxis, d/c, bleeding gums, neck stiffness masses, or dental issues.  -- Respiratory:  As above in HPI.  -- Cardiovascular:  As above in the HPI.   -- Gastrointestinal: No vomiting, abdominal pain, hematemesis, melena, dyspepsia, or change in bowel habits.  -- Genitourinary: No hematuria, dysuria, frequency, urgency, nocturia, polyuria, stones, or incontinence.  -- Integument/breast: No rash, pruritis, pigmentation changes, dryness, or changes in hair  -- Hematologic/lymphatic: No easy bruising or lymphadenopathy.   -- Musculoskeletal:  Chronic arthralgias.  No acute arthralgias, acute myalgias, joint swelling, acute limitations of ROM, or acute muscular weakness.  -- Neurological: No seizures, headaches, incoordination, paraesthesias, ataxia, vertigo, or tremors.  -- Behavioral/Psych: No auditory or visual hallucinations, depression, or suicidal/homicidal ideations.  --  Endocrine: No heat or cold intolerance, polydipsia, or unintentional weight gain / loss.  -- Allergy/Immunologic: No recurrent infections or adverse reaction to food, insects, or difficulty breathing.        PAST MEDICAL / SURGICAL HISTORY:     Past Medical History:   Diagnosis Date    Asthma     Cancer 10/10/2014    Bone cancer     CHF (congestive heart failure)     Cigarette smoker     COPD (chronic obstructive pulmonary disease)     Diabetes mellitus     Diabetes mellitus type II     Emphysema of lung     [place] (hard of hearing)     Hypercholesteremia     Hypertension     Myocardial infarct 2013    On home oxygen therapy     Palpitations     Pneumonia 2019    PVD (peripheral vascular disease)     Seizures     Stroke     Unsteady gait     uses a cane & walker     Past Surgical History:   Procedure Laterality Date    APPENDECTOMY       SECTION      CHOLECYSTECTOMY      HYSTERECTOMY      INSERTION, STENT, CORONARY ARTERY N/A 2019    [facility]formed by [facility] C[facility]    IVUS, Coronary  2019    Performed by [facility] at [facility] CATH LAB    Left heart cath Left 2019    Performed by Job Turcios MD at [facility] CATH LAB    Left heart cath Left 2019    Performed by [facility] at [facility] CATH LAB    Percutaneous coronary intervention N/A 2019    Performed by [facility] at [facility] CATH LAB    VASCULAR SURGERY      stent in L leg; unable to place in R leg d/t blockage         FAMILY HISTORY:     Family History   Problem Relation Age of Onset    Diabetes Mother     Asthma Mother     Cancer Father     Breast cancer Sister     Colon cancer Neg Hx     Ovarian cancer Neg Hx          SOCIAL HISTORY:     Social History     Socioeconomic History    Marital status:      Spouse name: Not on file    Number of children: Not on file    Years of education: Not on file    Highest education level: Not on file  "  Occupational History    Not on file   Social Needs    Financial resource strain: Not on file    Food insecurity:     Worry: Not on file     Inability: Not on file    Transportation needs:     Medical: Not on file     Non-medical: Not on file   Tobacco Use    Smoking status: Current Every Day Smoker     Packs/day: 0.50     Years: 56.00     Pack years: 28.00     Types: Cigarettes    Smokeless tobacco: Current User   Substance and Sexual Activity    Alcohol use: No    Drug use: No    Sexual activity: Never   Lifestyle    Physical activity:     Days per week: Not on file     Minutes per session: Not on file    Stress: Not on file   Relationships    Social connections:     Talks on phone: Not on file     Gets together: Not on file     Attends Restorationism service: Not on file     Active member of club or organization: Not on file     Attends meetings of clubs or organizations: Not on file     Relationship status: Not on file   Other Topics Concern    Not on file   Social History Narrative    Not on file         ALLERGIES:       Review of patient's allergies indicates:   Allergen Reactions    Morphine      Pt states, "I'm not allergic to morphine they gave me too much at [facility]. Instead of giving me 2mg she gave me 5mg and I was almost dead."     Codeine      Blisters vs sweling (pt unsure which one)    Iodinated contrast- oral and iv dye     Iodine containing multivitamin      Blisters vs swelling (pt unsure which one.)    Lidocaine          HEALTH SCREENING:     Prevnar 13 pneumonia vaccine = evidence of previous vaccination found in the medical record      HOME MEDICATIONS:     Prior to Admission medications    Medication Sig Start Date End Date Taking? Authorizing Provider   acetaminophen (TYLENOL) 325 MG tablet Take 2 tablets (650 mg total) by mouth every 4 (four) hours as needed for Pain or Temperature greater than (100). 3/10/19   Edin Anglin MD   ADVAIR DISKUS 100-50 mcg/dose diskus " "inhaler Take 1 puff by mouth Twice daily. 10/12/12   Historical Provider, MD   alendronate (FOSAMAX) 70 MG tablet Take 70 mg by mouth. 12/16/18   Historical Provider, MD   amlodipine (NORVASC) 10 MG tablet Take 1 tablet (10 mg total) by mouth once daily. 11/4/14   SUSANA Peñaloza   ANORO ELLIPTA 62.5-25 mcg/actuation DsDv  4/20/18   Historical Provider, MD   aspirin (ECOTRIN) 81 MG EC tablet Take 81 mg by mouth once daily.      Historical Provider, MD   atorvastatin (LIPITOR) 80 MG tablet TK 1 T PO D 5/21/18   Historical Provider, MD   BD ULTRA-FINE ANISHA PEN NEEDLE 32 gauge x 5/32" Ndle use for insulin up to FOUR TIMES DAILY 5/2/18   Historical Provider, MD   BIDIL 20-37.5 mg Tab TK 1 T PO TID 5/22/18   Historical Provider, MD   calcium-vitamin D (CALCIUM-VITAMIN D) 500 mg(1,250mg) -200 unit per tablet Take 1 tablet by mouth 2 (two) times daily with meals.      Historical Provider, MD   cloNIDine (CATAPRES) 0.1 MG tablet Take 1 tablet (0.1 mg total) by mouth 2 (two) times daily. 11/4/14 4/22/19  SUSANA Peñaloza   clopidogrel (PLAVIX) 75 mg tablet TK 1 T PO D 5/21/18   Historical Provider, MD   ergocalciferol (ERGOCALCIFEROL) 50,000 unit Cap Take 50,000 Units by mouth every 7 days.      Historical Provider, MD   famotidine (PEPCID) 40 MG tablet  5/23/18   Historical Provider, MD   furosemide (LASIX) 40 MG tablet Take 1 tablet (40 mg total) by mouth 2 (two) times daily. 3/10/19   Edin Anglin MD   gabapentin (NEURONTIN) 300 MG capsule  4/24/15   Historical Provider, MD   hydrocodone-acetaminophen 5-325mg (NORCO) 5-325 mg per tablet Take 1 tablet by mouth 3 (three) times daily as needed.  7/17/12   Historical Provider, MD   imatinib (GLEEVEC) 400 MG Tab Take 1 tablet (400 mg total) by mouth once daily. 3/28/19   Flaca Schofield MD   insulin glargine (LANTUS) 100 unit/mL injection Inject 18 Units into the skin every evening. 2/5/19   Faraz Harris MD   isosorbide mononitrate (IMDUR) " 30 MG 24 hr tablet Take 1 tablet (30 mg total) by mouth once daily. 3/5/16 4/22/19  Ruth Wong NP   lisinopril (PRINIVIL,ZESTRIL) 40 MG tablet Take 1 tablet (40 mg total) by mouth once daily. 11/4/14   SUSANA Peñaloza   metoprolol tartrate (LOPRESSOR) 25 MG tablet TK 1 T PO BID 5/21/18   Historical Provider, MD   nicotine (NICODERM CQ) 21 mg/24 hr Place 1 patch onto the skin once daily. 3/10/19   Edin Anglin MD   nitroGLYCERIN (NITROSTAT) 0.4 MG SL tablet Place 1 tablet (0.4 mg total) under the tongue every 5 (five) minutes as needed for Chest pain. 11/4/14   SUSANA Peñaloza   prednisoLONE acetate (PRED FORTE) 1 % DrpS  2/19/18   Historical Provider, MD   ranitidine (ZANTAC) 300 MG tablet TK 1 T PO QHS 5/22/18   Historical Provider, MD   ranolazine (RANEXA) 500 MG Tb12 Take 500 mg by mouth 2 (two) times daily.    Historical Provider, MD   tiotropium (SPIRIVA) 18 mcg inhalation capsule Inhale 18 mcg into the lungs once daily.      Historical Provider, MD   VENTOLIN HFA 90 mcg/actuation HFAA Inhale 2 puffs into the lungs every 4 to 6 hours as needed. 10/12/12   Historical Provider, MD          HOSPITAL MEDICATIONS:     Scheduled Meds:    atorvastatin  80 mg Oral Daily    enoxparin  1 mg/kg Subcutaneous Q12H    famotidine  20 mg Oral BID    metoprolol tartrate  25 mg Oral BID    senna-docusate 8.6-50 mg  1 tablet Oral BID     Continuous Infusions:   PRN Meds: acetaminophen, Dextrose 10% Bolus, glucagon (human recombinant), insulin aspart U-100, ondansetron, ondansetron, sodium chloride 0.9%      PHYSICAL EXAM:     Wt Readings from Last 1 Encounters:   07/05/19 0355 57.6 kg (126 lb 15.8 oz)   07/04/19 2256 60.8 kg (134 lb)     Body mass index is 21.8 kg/m².  Vitals:    07/05/19 0233 07/05/19 0302 07/05/19 0331 07/05/19 0355   BP: (!) 150/72 (!) 147/70 (!) 146/70 (!) 145/68   BP Location:   Right arm Left arm   Patient Position:   Lying Lying   Pulse: 93 91 88 93   Resp:   20 18   Temp:    98.6 °F (37 °C) 98.6 °F (37 °C)   TempSrc:   Oral Oral   SpO2: 100% 98% 99% 100%   Weight:    57.6 kg (126 lb 15.8 oz)   Height:              -- General appearance:  Chronically ill-appearing elderly female who is lying in bed.  No apparent distress.  well developed. appears stated age   -- Head: normocephalic, atraumatic   -- Eyes: conjunctivae clear. Extraocular muscles intact  -- Nose: Nares normal. Septum midline.   -- Mouth/Throat: lips, mucosa, and tongue normal. no throat erythema.   -- Neck: supple, symmetrical, trachea midline, no JVD and thyroid not grossly enlarged, appears symmetric  -- Lungs:  Decreased breath sounds to auscultation bilaterally with prolonged expiratory phase and poor excursion consistent with long-term smoker.  Bibasilar crackles. normal respiratory effort. No use of accessory muscles.   -- Chest wall: no tenderness. equal bilateral chest rise   -- Heart:  Rapid rate and regular rhythm. S1, S2 normal.  no click, rub or gallop   -- Abdomen: soft, non-tender, non-distended, non-tympanic; bowel sounds normal; no masses  -- Extremities: no cyanosis, clubbing or edema.   -- Pulses: 2+ and symmetric   -- Skin: color normal, texture normal, turgor normal. No rashes or lesions.   -- Neurologic:  Globally decreased muscle strength and tone. No focal numbness or weakness. CNII-XII intact. Luis coma scale: eyes open spontaneously-4, oriented & converses-5, obeys commands-6.      LABORATORY STUDIES:     Recent Results (from the past 36 hour(s))   CBC auto differential    Collection Time: 07/04/19 11:50 PM   Result Value Ref Range    WBC 9.17 3.90 - 12.70 K/uL    RBC 3.99 (L) 4.00 - 5.40 M/uL    Hemoglobin 10.2 (L) 12.0 - 16.0 g/dL    Hematocrit 31.6 (L) 37.0 - 48.5 %    Mean Corpuscular Volume 79 (L) 82 - 98 fL    Mean Corpuscular Hemoglobin 25.6 (L) 27.0 - 31.0 pg    Mean Corpuscular Hemoglobin Conc 32.3 32.0 - 36.0 g/dL    RDW 21.2 (H) 11.5 - 14.5 %    Platelets 262 150 - 350 K/uL    MPV 10.4  9.2 - 12.9 fL    Gran # (ANC) 8.1 (H) 1.8 - 7.7 K/uL    Lymph # 0.7 (L) 1.0 - 4.8 K/uL    Mono # 0.3 0.3 - 1.0 K/uL    Eos # 0.1 0.0 - 0.5 K/uL    Baso # 0.01 0.00 - 0.20 K/uL    Gran% 89.0 (H) 38.0 - 73.0 %    Lymph% 7.2 (L) 18.0 - 48.0 %    Mono% 3.3 (L) 4.0 - 15.0 %    Eosinophil% 0.7 0.0 - 8.0 %    Basophil% 0.1 0.0 - 1.9 %    Differential Method Automated    Comprehensive metabolic panel    Collection Time: 07/04/19 11:50 PM   Result Value Ref Range    Sodium 138 136 - 145 mmol/L    Potassium 3.4 (L) 3.5 - 5.1 mmol/L    Chloride 100 95 - 110 mmol/L    CO2 25 23 - 29 mmol/L    Glucose 226 (H) 70 - 110 mg/dL    BUN, Bld 17 8 - 23 mg/dL    Creatinine 0.8 0.5 - 1.4 mg/dL    Calcium 8.9 8.7 - 10.5 mg/dL    Total Protein 7.0 6.0 - 8.4 g/dL    Albumin 3.6 3.5 - 5.2 g/dL    Total Bilirubin 0.6 0.1 - 1.0 mg/dL    Alkaline Phosphatase 69 55 - 135 U/L    AST 35 10 - 40 U/L    ALT 26 10 - 44 U/L    Anion Gap 13 8 - 16 mmol/L    eGFR if African American >60 >60 mL/min/1.73 m^2    eGFR if non African American >60 >60 mL/min/1.73 m^2   Troponin I #1    Collection Time: 07/04/19 11:50 PM   Result Value Ref Range    Troponin I 23.141 (H) 0.000 - 0.026 ng/mL   B-Type natriuretic peptide (BNP)    Collection Time: 07/04/19 11:50 PM   Result Value Ref Range    BNP 2,656 (H) 0 - 99 pg/mL   D dimer, quantitative    Collection Time: 07/04/19 11:50 PM   Result Value Ref Range    D-Dimer 1.50 (H) <0.50 mg/L FEU   Troponin I #2    Collection Time: 07/05/19  2:14 AM   Result Value Ref Range    Troponin I 18.072 (H) 0.000 - 0.026 ng/mL       Lab Results   Component Value Date    INR 1.1 06/14/2019    INR 1.0 03/03/2016    INR 1.0 04/09/2015     Lab Results   Component Value Date    HGBA1C 7.5 (H) 06/13/2019     Recent Labs     07/02/19  0805 07/02/19  1140 07/02/19  1605 07/02/19  2213 07/03/19  1147   POCTGLUCOSE 175* 217* 92 133* 144*           IMAGING:     Imaging Results          X-Ray Chest AP Portable (Final result)  Result time  07/04/19 23:36:25    Final result by Charu Arndt MD (07/04/19 23:36:25)                 Impression:      No failure or pneumonia or nodule.  Scoliosis unchanged.      Electronically signed by: Charu Arndt  Date:    07/04/2019  Time:    23:36             Narrative:    EXAMINATION:  XR CHEST AP PORTABLE    CLINICAL HISTORY:  Chest Pain;    TECHNIQUE:  Single frontal view of the chest was performed.    COMPARISON:  03/03/2016 chest film    FINDINGS:  Single AP portable view at 23:23.    Heart is mildly enlarged unchanged.  Main pulmonary artery appears enlarged suggesting underlying pulmonary artery hypertension unchanged.  There is thoracic scoliosis convex to the right unchanged.  No interstitial edema consolidation or nodule.  The hilar shadows are probably normal.  Trachea appears normal.  No abdominal free air.                                  CONSULTS:     IP CONSULT TO CARDIOLOGY       ASSESSMENT & PLAN:     Primary Diagnosis:  <principal problem not specified>    Active Hospital Problems    Diagnosis  POA    Shortness of breath [R06.02]  Yes     Priority: 1 - High    Elevated troponin I level [R74.8]  Unknown     Priority: 2     Essential hypertension [I10]  Yes    Hyperlipidemia [E78.5]  Yes     Chronic    Type 2 diabetes mellitus, uncontrolled [E11.65]  Yes     Chronic    CAD s/p PCI [I25.10]  Yes     Chronic    Tobacco abuse [Z72.0]  Yes     Chronic    Hypertension [I10]  Yes     Chronic    COPD (chronic obstructive pulmonary disease) [J44.9]  Yes     Chronic    PVD (peripheral vascular disease) [I73.9]  Yes    Emphysema of lung [J43.9]  Yes      Resolved Hospital Problems   No resolved problems to display.         Shortness of breath  · Multifactorial etiology including:  CHF with recent MI, COPD and emphysema, history of tobacco abuse, and chronic supplemental oxygen dependence  · Individual conditions as outlined below    Elevated troponin level  with history of coronary artery disease  and recent NSTEMI  Recent admission for non ST elevation MI and treatment with percutaneous intervention and stenting of her circumflex artery.  Previous troponin levels greater than 50.  Some elevation is expected after intervention.  Inverted T-waves consistent with recent MI.  Doubt acute coronary syndrome.  Primary presented symptoms were due to shortness of breath secondary to running out of supplemental oxygen.  Patient was transferred with concern about elevated troponin; Trending troponins as a precaution.  Continue Plavix, Ranexa, lisinopril, Imdur, Lasix, aspirin, Bidil, and Lipitor.    Essential Hypertension  · Goal while inpatient is a systolic blood pressure less than 160mmHg  · BP in acceptable range at this time  · Continue current home regimen with hold parameters  · PRN antihypertensives available    Hyperlipidemia   · Lipid panel - as an outpatient  · Cardiac diet  · Continue statin    Hyperglycemia secondary to Diabetes mellitus type 2  · BG is above acceptable range at this time; insulin given  · Maintain w/ subcutaneous insulin management order set  · Hold oral diabetic meds  · ADA 1800 kcal diet  · BG goal while in patient is <180mg/dL  · HgA1c = Pending    Tobacco abuse   · Chronic tobacco use  · Tobacco cessation counseling  · Nicotine patch offered; consider Wellbutrin or Chantix through PCP as an outpatient (will require closer monitoring)  · I discussed with the patient regarding the hazardous effects of smoking on increasing risk of heart attack and stroke, worsening lung functions, and increasing cancer risk.   Patient was urged to stop smoking now.  I also offered nicotine taper (such as nicotine patch and gum) to help ease the craving to smoke.    Stable COPD  · No acute issues  · Continue with home medication regimen  · Nebulizer treatments (albuterol/atrovent)  · Titrate O2 sats between 88 to 93%.  No supplemental 02 for 02 saturation greater than 93% due to V/Q mismatch  · Continue  Advair 500/50mg  · Mucolytics as needed  · Outpatient referral to pulmonology for further optimization  · Tobacco cessation counseling            VTE Risk Mitigation (From admission, onward)        Ordered     IP VTE HIGH RISK PATIENT  Once      07/05/19 0356     Place ELEONORA hose  Until discontinued      07/05/19 0356     Place sequential compression device  Until discontinued      07/05/19 0356     enoxaparin injection 60 mg  Every 12 hours (non-standard times)      07/05/19 0224            Adult PRN medications available   DVT prophylaxis given       DISPOSITION:     Will admit to the Hospital Medicine service for further evaluation and treatment.    Chart reviewed and updated where applicable.    High Risk Conditions:  Patient has a condition that poses threat to life and bodily function:  Shortness of breath, hypoxemia, elevated troponin level, EKG changes      ===============================================================    Fermin Christian MD, MPH  Department of Hospital Medicine   [facility]  [phone] pg  (7pm - 6am)          This note is dictated using [employer] voice recognition software.  There are word recognition mistakes that are occasionally missed on review.

## 2019-07-05 NOTE — SUBJECTIVE & OBJECTIVE
"Past Medical History:   Diagnosis Date    Asthma     Cancer 10/10/2014    Bone cancer     CHF (congestive heart failure)     Cigarette smoker     COPD (chronic obstructive pulmonary disease)     Diabetes mellitus     Diabetes mellitus type II     Emphysema of lung     [place] (hard of hearing)     Hypercholesteremia     Hypertension     Myocardial infarct 2013    On home oxygen therapy     Palpitations     Pneumonia 2019    PVD (peripheral vascular disease)     Seizures     Stroke     Unsteady gait     uses a cane & walker       Past Surgical History:   Procedure Laterality Date    APPENDECTOMY       SECTION      CHOLECYSTECTOMY      HYSTERECTOMY      INSERTION, STENT, CORONARY ARTERY N/A 2019    [facility]formed by [facility]in [facility]aly [facility] at [facility] CATH LAB    IVUS, Coronary  2019    [facility] at [facility] CATH LAB    Left heart cath Left 2019    [facility] by Job [facility]cios [facility] at [facility] CATH LAB    Left heart cath Left 2019    [facility] CATH LAB    Percutaneous coronary intervention N/A 2019    [facility] by [facility]in [facility] at [facility] CATH LAB    VASCULAR SURGERY      stent in L leg; unable to place in R leg d/t blockage       Review of patient's allergies indicates:   Allergen Reactions    Morphine      Pt states, "I'm not allergic to morphine they gave me too much at [facility]. Instead of giving me 2mg she gave me 5mg and I was almost dead."     Codeine      Blisters vs sweling (pt unsure which one)    Iodinated contrast- oral and iv dye     Iodine containing multivitamin      Blisters vs swelling (pt unsure which one.)    Lidocaine        No current facility-administered medications on file prior to encounter.      Current Outpatient Medications on File Prior to Encounter   Medication Sig    acetaminophen (TYLENOL) 325 MG tablet Take 2 tablets (650 mg total) by mouth every 4 (four) hours as " "needed for Pain or Temperature greater than (100).    ADVAIR DISKUS 100-50 mcg/dose diskus inhaler Take 1 puff by mouth Twice daily.    alendronate (FOSAMAX) 70 MG tablet Take 70 mg by mouth.    amlodipine (NORVASC) 10 MG tablet Take 1 tablet (10 mg total) by mouth once daily.    ANORO ELLIPTA 62.5-25 mcg/actuation DsDv     aspirin (ECOTRIN) 81 MG EC tablet Take 81 mg by mouth once daily.      atorvastatin (LIPITOR) 80 MG tablet TK 1 T PO D    BD ULTRA-FINE ANISHA PEN NEEDLE 32 gauge x 5/32" Ndle use for insulin up to FOUR TIMES DAILY    BIDIL 20-37.5 mg Tab TK 1 T PO TID    calcium-vitamin D (CALCIUM-VITAMIN D) 500 mg(1,250mg) -200 unit per tablet Take 1 tablet by mouth 2 (two) times daily with meals.      cloNIDine (CATAPRES) 0.1 MG tablet Take 1 tablet (0.1 mg total) by mouth 2 (two) times daily.    clopidogrel (PLAVIX) 75 mg tablet TK 1 T PO D    ergocalciferol (ERGOCALCIFEROL) 50,000 unit Cap Take 50,000 Units by mouth every 7 days.      famotidine (PEPCID) 40 MG tablet     furosemide (LASIX) 40 MG tablet Take 1 tablet (40 mg total) by mouth 2 (two) times daily.    gabapentin (NEURONTIN) 300 MG capsule     hydrocodone-acetaminophen 5-325mg (NORCO) 5-325 mg per tablet Take 1 tablet by mouth 3 (three) times daily as needed.     imatinib (GLEEVEC) 400 MG Tab Take 1 tablet (400 mg total) by mouth once daily.    insulin glargine (LANTUS) 100 unit/mL injection Inject 18 Units into the skin every evening.    isosorbide mononitrate (IMDUR) 30 MG 24 hr tablet Take 1 tablet (30 mg total) by mouth once daily.    lisinopril (PRINIVIL,ZESTRIL) 40 MG tablet Take 1 tablet (40 mg total) by mouth once daily.    metoprolol tartrate (LOPRESSOR) 25 MG tablet TK 1 T PO BID    nicotine (NICODERM CQ) 21 mg/24 hr Place 1 patch onto the skin once daily.    nitroGLYCERIN (NITROSTAT) 0.4 MG SL tablet Place 1 tablet (0.4 mg total) under the tongue every 5 (five) minutes as needed for Chest pain.    prednisoLONE " acetate (PRED FORTE) 1 % DrpS     ranitidine (ZANTAC) 300 MG tablet TK 1 T PO QHS    ranolazine (RANEXA) 500 MG Tb12 Take 500 mg by mouth 2 (two) times daily.    tiotropium (SPIRIVA) 18 mcg inhalation capsule Inhale 18 mcg into the lungs once daily.      VENTOLIN HFA 90 mcg/actuation HFAA Inhale 2 puffs into the lungs every 4 to 6 hours as needed.     Family History     Problem Relation (Age of Onset)    Asthma Mother    Breast cancer Sister    Cancer Father    Diabetes Mother        Tobacco Use    Smoking status: Current Every Day Smoker     Packs/day: 0.50     Years: 56.00     Pack years: 28.00     Types: Cigarettes    Smokeless tobacco: Current User   Substance and Sexual Activity    Alcohol use: No    Drug use: No    Sexual activity: Never     Review of Systems   Constitution: Negative for decreased appetite.   HENT: Negative for ear discharge.    Eyes: Negative for blurred vision.   Respiratory: Negative for hemoptysis.    Endocrine: Negative for polyphagia.   Hematologic/Lymphatic: Negative for adenopathy.   Skin: Negative for color change.   Musculoskeletal: Negative for joint swelling.   Genitourinary: Negative for bladder incontinence.   Neurological: Negative for brief paralysis.   Psychiatric/Behavioral: Negative for hallucinations.   Allergic/Immunologic: Negative for hives.     Objective:     Vital Signs (Most Recent):  Temp: 97.7 °F (36.5 °C) (07/05/19 0828)  Pulse: 102 (07/05/19 0840)  Resp: 18 (07/05/19 0840)  BP: (!) 144/80 (07/05/19 0828)  SpO2: 99 % (07/05/19 0840) Vital Signs (24h Range):  Temp:  [97.7 °F (36.5 °C)-98.6 °F (37 °C)] 97.7 °F (36.5 °C)  Pulse:  [] 102  Resp:  [18-24] 18  SpO2:  [97 %-100 %] 99 %  BP: (143-179)/(65-80) 144/80     Weight: 57.6 kg (126 lb 15.8 oz)  Body mass index is 21.8 kg/m².    SpO2: 99 %  O2 Device (Oxygen Therapy): nasal cannula    No intake or output data in the 24 hours ending 07/05/19 0914    Lines/Drains/Airways     Peripheral Intravenous Line                  Peripheral IV - Single Lumen 07/04/19 20 G Right Wrist 1 day                Physical Exam   Constitutional: She is oriented to person, place, and time. She appears well-developed and well-nourished.   HENT:   Head: Normocephalic and atraumatic.   Eyes: Pupils are equal, round, and reactive to light. Conjunctivae are normal.   Neck: Normal range of motion. Neck supple.   Cardiovascular: Normal rate, normal heart sounds and intact distal pulses.   Pulmonary/Chest: Effort normal and breath sounds normal.   Abdominal: Soft. Bowel sounds are normal.   Musculoskeletal: Normal range of motion.   Neurological: She is alert and oriented to person, place, and time.   Skin: Skin is warm and dry.       Significant Labs: All pertinent lab results from the last 24 hours have been reviewed.    Significant Imaging: Echocardiogram:   2D echo with color flow doppler:   Results for orders placed or performed during the hospital encounter of 03/03/16   2D echo with color flow doppler   Result Value Ref Range    QEF 40 (A) 55 - 65    Mitral Valve Regurgitation TRIVIAL     Diastolic Dysfunction Yes (A)     Est. PA Systolic Pressure 30.47     Pericardial Effusion NONE     Mitral Valve Mobility NORMAL     Tricuspid Valve Regurgitation TRIVIAL     Narrative    TEST DESCRIPTION   Technical Quality: This is a technically adequate study.     Aorta: The aortic root is normal in size, measuring 2.0 cm at sinotubular junction and 2.5 cm at Sinuses of Valsalva. The proximal ascending aorta is normal in size, measuring 1.8 cm across.     Left Atrium: The left atrial volume index is normal, measuring 22.70 cc/m2.     Left Ventricle: The left ventricle is normal in size, with an end-diastolic diameter of 5.0 cm, and an end-systolic diameter of 3.7 cm. Wall thickness is mildly increased, with the septum measuring 1.2 cm and the posterior wall measuring 1.1 cm across.   Relative wall thickness was increased at 0.44, and the LV mass index  was increased at 149.1 g/m2 consistent with mild concentric left ventricular hypertrophy. Global left ventricular systolic function appears mildly to moderately depressed. Visually   estimated ejection fraction is 40-45%. The LV Doppler derived stroke volume equals 63.0 ccs.   The E/e'(lat) is 12, consistent with significant diastolic dysfunction.     Right Atrium: The right atrium is normal in size, measuring 4.1 cm in length and 2.7 cm in width in the apical view.     Right Ventricle: The right ventricle is normal in size measuring 2.9 cm at the base in the apical right ventricle-focused view. Global right ventricular systolic function appears normal. Tricuspid annular plane systolic excursion (TAPSE) is 1.6 cm. The   estimated PA systolic pressure is 30 mmHg.     Aortic Valve:  The aortic valve is mildly sclerotic with normal leaflet mobility. The aortic valve is tri-leaflet in structure.     Mitral Valve:  The mitral valve is normal in structure with normal leaflet mobility. There is trivial mitral regurgitation.     Tricuspid Valve:  The tricuspid valve is normal in structure with normal leaflet mobility. There is trivial tricuspid regurgitation.     Pulmonary Valve:  The pulmonic valve is not well seen.     Pericardium: There is no evidence of pericardial effusion.      IVC: IVC is enlarged but collapses > 50% with a sniff, suggesting intermediate right atrial pressure of 8 mmHg.     Intracavitary: There is no evidence of intracavity mass, thrombi, or vegetation.         CONCLUSIONS     1 - Mildly to moderately depressed left ventricular systolic function (EF 40-45%).  Mild global hypokinesis..     2 - Concentric hypertrophy.     3 - Left ventricular diastolic dysfunction.     4 - Trivial mitral regurgitation.     5 - Trivial tricuspid regurgitation.     6 - The estimated PA systolic pressure is 30 mmHg.         This document has been electronically    SIGNED BY: Fermin Healy MD On: 03/04/2016 15:03

## 2019-07-05 NOTE — NURSING
Reviewed all discharge instructions with patient, new medications, continued medications, follow-up appointments and signs/symptoms to report to PCP or seek emergency medical care. Patient verbalized understanding to all instructions and education. Patient denies any complaints or concerns at this time.

## 2019-07-05 NOTE — HPI
This is a 74 y.o. Female PMHx MI, cardiac stents, CAD who presents to the ED complaining of shortness of breath for several days. The patient was seen today at [facility] for shortness of breath. She was sent here for concern for EKG changes including new T-wave inversions in V4 and V5.   She reports left lateral chest pain. The patient had a recent MI.  At [facility], Her oxygen tank was reportedly empty and not working.     Denies CP or SOB currently  Reports she went back to the ER because her O2 tank was not working  EKG NSR LVH with reol  Troponin 23 trending down  BNP 2656 but CXR without volume overload    Known to me - recently admitted with NSTEMI and CHF    7/1/19 [facility] - EDP 18, LAD moderate diffuse disease with mid 70%, Cx long proximal 50% - mid stent patent with thrombus noted in mid portion, RCA known to be occluded - distal vessel fills with left to right collaterals     Findings/Key Components:  Dominance: Right  LM: MLI  LAD: prox eccentric 70%, mid stent patent, iFR 0.71  LCx: thrombus within 3.5x38 Resolute SAM (6/14/19) but T3 flow  RCA: , not injected, collateralized from LCA     PCI mid LCx AST:  Preop asa/plavix, intraop angiomax  POBA 3.5x20 at 20 tarah, excellent angio resule, T3 flow, 0% residual stenosis  IVUS with good stent expansion and apposition.  Distal vessel 8.3mm2, ost vessel 5.4mm2     PCI mid LAD  Predil 2.5x12  Stent 3.0x22 Resolute SAM 18 tarah  Excellent result, T3 flow, 0% residual stenosis     Hemostasis:  RFA man comp     Impression:  NSTEMI  3V CAD, severe LV dysfxn  Successful PCI:  AST mid LCx s/p 3.5x20 POBA/IVUS  Mid LAD (+iFR) 3.0x22 Resolute SAM  RFA man comp     Plan:  Obs overnight  ASA 81mg qd indefinitely  Plavix 75mg qd for 1 year (thru 7/2020)  BBl/ACEi/Statin  Home in am  Follow up with Dr. Turcios after discharge     Echo 6./30/19  · Severely decreased left ventricular systolic function. The estimated ejection fraction is 15%  · Concentric left  ventricular hypertrophy.  · Grade II (moderate) left ventricular diastolic dysfunction consistent with pseudonormalization.  · Normal right ventricular systolic function.  · Moderate left atrial enlargement.  · Mild-to-moderate mitral regurgitation.  · Mild tricuspid regurgitation.  · The estimated PA systolic pressure is 38 mm Hg

## 2019-07-05 NOTE — DISCHARGE SUMMARY
[facility] Hospital Medicine  Discharge Summary      Patient Name: Susan Howell  MRN: [id number]  Admission Date: 2019  Hospital Length of Stay: 1 days  Discharge Date and Time:  2019 1:21 PM  Attending Physician: Faraz Harris MD   Discharging Provider: Faraz Harris MD  Primary Care Provider: Linda Mckee MD      HPI:     Susan Howell is a 74 y.o. female that (in part)  has a past medical history of Asthma, Cancer, CHF (congestive heart failure), Cigarette smoker, COPD (chronic obstructive pulmonary disease), Diabetes mellitus, Diabetes mellitus type II, Emphysema of lung, [place] (hard of hearing), Hypercholesteremia, Hypertension, Myocardial infarct, On home oxygen therapy, Palpitations, Pneumonia, PVD (peripheral vascular disease), Seizures, Stroke, and Unsteady gait.  has a past surgical history that includes  section; Cholecystectomy; Hysterectomy; Appendectomy; Vascular surgery; Left heart catheterization (Left, 2019); Left heart catheterization (Left, 2019); and Coronary stent placement (N/A, 2019). Presents to [facility] Emergency Department complaining of shortness of breath.  Initially the patient was seen at [facility] today for shortness of breath.  It was determined that her O2 tank ran out.  EKG, routine laboratory studies and cardiac enzymes were also obtained as part of the routine workup.  She had a markedly elevated troponin level.  Of note she underwent percutaneous intervention and had a stent placed in the left circumflex artery approximately 1 and half weeks ago.  She was subsequently seen began for severe shortness of breath thought to be due to CHF which progressed to respiratory failure requiring intubation.  She was discharged from this facility yesterday.  The transferring facility center here for further evaluation due to the elevated troponin and suspected EKG changes. At this time  pt reports soreness under the left breast and SOB. She denies any other symptoms.  No fever chills.  No cough, hemoptysis, palpitations, or syncope.  No paresthesias or unilateral weakness.    In the emergency department repeat troponin showed decreased from greater than 50 during the last hospital visit, to 23 at the transferring facility, and down to 18 while here.  EKG showed inverted T-waves consistent with recent MI.    Hospital medicine has been asked to admit for further evaluation and treatment.     * No surgery found *      Hospital Course:     Susan Howell is a 74 y.o. female that (in part)  has a past medical history of Asthma, Cancer, CHF (congestive heart failure), Cigarette smoker, COPD (chronic obstructive pulmonary disease), Diabetes mellitus, Diabetes mellitus type II, Emphysema of lung, [place] (hard of hearing), Hypercholesteremia, Hypertension, Myocardial infarct, On home oxygen therapy, Palpitations, Pneumonia, PVD (peripheral vascular disease), Seizures, Stroke, and Unsteady gait.  has a past surgical history that includes  section; Cholecystectomy; Hysterectomy; Appendectomy; Vascular surgery; Left heart catheterization (Left, 2019); Left heart catheterization (Left, 2019); and Coronary stent placement (N/A, 2019). Presents to [facility] Emergency Department complaining of shortness of breath.  Initially the patient was seen at [facility] today shortness of breath.  It was determined that her O2 tank ran out.  EKG, routine laboratory studies and cardiac enzymes were also obtained as part of the routine workup.  She had a markedly elevated troponin level.  Of note she underwent percutaneous intervention and had a stent placed in the left circumflex artery approximately 1 and half weeks ago.  She was subsequently seen began for severe shortness of breath thought to be due to CHF which progressed to respiratory failure requiring intubation.   She was discharged from this facility just 2 days ago,.  The transferring facility center here for further evaluation due to the elevated troponin and suspected EKG changes. At this time pt reports soreness under the left breast and SOB. She denies any other symptoms.  No fever chills.  No cough, hemoptysis, palpitations, or syncope.  No paresthesias or unilateral weakness.    In the emergency department repeat troponin showed decreased from greater than 50 during the last hospital visit, to 23 at the transferring facility, and down to 18 while here.  EKG showed inverted T-waves consistent with recent MI.  cardiology was consulted,no need for further workout,elevated Troponin duo to recent MI and  trending down,she was stable on RA and she has actually her home oxygen,patient has kristal discharged home with PCP and cardiology follow up.     Consults:   Consults (From admission, onward)        Status Ordering Provider     Inpatient consult to Cardiology  Once     Provider:  Job Turcios MD    Acknowledged DIANE UMANZOR          No new Assessment & Plan notes have been filed under this hospital service since the last note was generated.  Service: Hospital Medicine    Final Active Diagnoses:    Diagnosis Date Noted POA    PRINCIPAL PROBLEM:  Shortness of breath [R06.02] 07/05/2019 Yes    Acute on chronic systolic congestive heart failure [I50.23] 06/13/2019 Yes    Essential hypertension [I10] 02/04/2019 Yes    Hyperlipidemia [E78.5] 10/27/2014 Yes     Chronic    Type 2 diabetes mellitus, uncontrolled [E11.65] 08/19/2014 Yes     Chronic    Elevated troponin I level [R74.8] 08/19/2014 Yes    CAD s/p PCI [I25.10] 06/07/2014 Yes     Chronic    Tobacco abuse [Z72.0] 06/07/2014 Yes     Chronic    Hypertension [I10]  Yes     Chronic    COPD (chronic obstructive pulmonary disease) [J44.9]  Yes     Chronic    PVD (peripheral vascular disease) [I73.9]  Yes    Emphysema of lung [J43.9]  Yes      Problems  Resolved During this Admission:       Discharged Condition: stable    Disposition: Home-Health Care [facility]    Follow Up:  Follow-up Information     Lio Orozco. Go on 7/10/2019.    Why:  Outpatient Services Hospital F/U with PCP at 9:30 am.   Contact information:  [address] [place] [address]. [address]   [place] [address]   [phone]           Job Turcios MD. Go on 7/31/2019.    Specialty:  Cardiology  Why:  Outpatient Services [facility] F/U with Cardiologist at 9:45 am.   Contact information:  [address]  [address]  [place] [id number] [phone]                 Patient Instructions:      Activity as tolerated       Significant Diagnostic Studies: Labs:   BMP:   Recent Labs   Lab 07/04/19  2350 07/05/19  0803   * 290*    140   K 3.4* 4.0    99   CO2 25 27   BUN 17 15   CREATININE 0.8 0.8   CALCIUM 8.9 8.7   , CBC   Recent Labs   Lab 07/04/19 2350 07/05/19  0803   WBC 9.17 6.64   HGB 10.2* 11.0*   HCT 31.6* 34.5*    292    and Troponin   Recent Labs   Lab 07/05/19  0803   TROPONINI 16.784*     Radiology: X-Ray: CXR: X-Ray Chest 1 View (CXR): No results found for this visit on 07/04/19. and X-Ray Chest PA and Lateral (CXR): No results found for this visit on 07/04/19.    Pending Diagnostic Studies:     Procedure Component Value Units Date/Time    Hemoglobin A1c [[id number]] Collected:  07/05/19 0803    Order Status:  Sent Lab Status:  In process Updated:  07/05/19 0804    Specimen:  Blood     Troponin I [[id number]]     Order Status:  Sent Lab Status:  No result     Specimen:  Blood          Medications:  Reconciled Home Medications:      Medication List      CONTINUE taking these medications    acetaminophen 325 MG tablet  Commonly known as:  TYLENOL  Take 2 tablets (650 mg total) by mouth every 4 (four) hours as needed for Pain or Temperature greater than (100).     ADVAIR DISKUS 100-50 mcg/dose diskus inhaler  Generic drug:  fluticasone-salmeterol 100-50 mcg/dose  Take 1 puff by  "mouth Twice daily.     alendronate 70 MG tablet  Commonly known as:  FOSAMAX  Take 70 mg by mouth.     amLODIPine 10 MG tablet  Commonly known as:  NORVASC  Take 1 tablet (10 mg total) by mouth once daily.     ANORO ELLIPTA 62.5-25 mcg/actuation Dsdv  Generic drug:  umeclidinium-vilanterol     aspirin 81 MG EC tablet  Commonly known as:  ECOTRIN  Take 81 mg by mouth once daily.     atorvastatin 80 MG tablet  Commonly known as:  LIPITOR  TK 1 T PO D     BD ULTRA-FINE ANISHA PEN NEEDLE 32 gauge x 5/32" Ndle  Generic drug:  pen needle, diabetic  use for insulin up to FOUR TIMES DAILY     BIDIL 20-37.5 mg Tab  Generic drug:  isosorbide-hydrALAZINE 20-37.5 mg  TK 1 T PO TID     CALCIUM 500 + D 500 mg(1,250mg) -200 unit per tablet  Generic drug:  calcium-vitamin D3  Take 1 tablet by mouth 2 (two) times daily with meals.     cloNIDine 0.1 MG tablet  Commonly known as:  CATAPRES  Take 1 tablet (0.1 mg total) by mouth 2 (two) times daily.     clopidogrel 75 mg tablet  Commonly known as:  PLAVIX  TK 1 T PO D     ergocalciferol 50,000 unit Cap  Commonly known as:  ERGOCALCIFEROL  Take 50,000 Units by mouth every 7 days.     famotidine 40 MG tablet  Commonly known as:  PEPCID     furosemide 40 MG tablet  Commonly known as:  LASIX  Take 1 tablet (40 mg total) by mouth 2 (two) times daily.     gabapentin 300 MG capsule  Commonly known as:  NEURONTIN     HYDROcodone-acetaminophen 5-325 mg per tablet  Commonly known as:  NORCO  Take 1 tablet by mouth 3 (three) times daily as needed.     imatinib 400 MG Tab  Commonly known as:  GLEEVEC  Take 1 tablet (400 mg total) by mouth once daily.     insulin glargine 100 unit/mL injection  Commonly known as:  LANTUS  Inject 18 Units into the skin every evening.     isosorbide mononitrate 30 MG 24 hr tablet  Commonly known as:  IMDUR  Take 1 tablet (30 mg total) by mouth once daily.     lisinopril 40 MG tablet  Commonly known as:  PRINIVIL,ZESTRIL  Take 1 tablet (40 mg total) by mouth once " daily.     metoprolol tartrate 25 MG tablet  Commonly known as:  LOPRESSOR  TK 1 T PO BID     nicotine 21 mg/24 hr  Commonly known as:  NICODERM CQ  Place 1 patch onto the skin once daily.     nitroGLYCERIN 0.4 MG SL tablet  Commonly known as:  NITROSTAT  Place 1 tablet (0.4 mg total) under the tongue every 5 (five) minutes as needed for Chest pain.     prednisoLONE acetate 1 % Drps  Commonly known as:  PRED FORTE     ranitidine 300 MG tablet  Commonly known as:  ZANTAC  TK 1 T PO QHS     ranolazine 500 MG Tb12  Commonly known as:  RANEXA  Take 500 mg by mouth 2 (two) times daily.     tiotropium 18 mcg inhalation capsule  Commonly known as:  SPIRIVA  Inhale 18 mcg into the lungs once daily.     VENTOLIN HFA 90 mcg/actuation inhaler  Generic drug:  albuterol  Inhale 2 puffs into the lungs every 4 to 6 hours as needed.            Indwelling Lines/Drains at time of discharge:   Lines/Drains/Airways          None          Time spent on the discharge of patient: less than 30 minutes  Patient was seen and examined on the date of discharge and determined to be suitable for discharge.         Faraz Harris MD  Department of Hospital Medicine  [facility]

## 2019-07-05 NOTE — HOSPITAL COURSE
Susan Howell is a 74 y.o. female that (in part)  has a past medical history of Asthma, Cancer, CHF (congestive heart failure), Cigarette smoker, COPD (chronic obstructive pulmonary disease), Diabetes mellitus, Diabetes mellitus type II, Emphysema of lung, [place] (hard of hearing), Hypercholesteremia, Hypertension, Myocardial infarct, On home oxygen therapy, Palpitations, Pneumonia, PVD (peripheral vascular disease), Seizures, Stroke, and Unsteady gait.  has a past surgical history that includes  section; Cholecystectomy; Hysterectomy; Appendectomy; Vascular surgery; Left heart catheterization (Left, 2019); Left heart catheterization (Left, 2019); and Coronary stent placement (N/A, 2019). Presents to [facility] Emergency Department complaining of shortness of breath.  Initially the patient was seen at [facility] today shortness of breath.  It was determined that her O2 tank ran out.  EKG, routine laboratory studies and cardiac enzymes were also obtained as part of the routine workup.  She had a markedly elevated troponin level.  Of note she underwent percutaneous intervention and had a stent placed in the left circumflex artery approximately 1 and half weeks ago.  She was subsequently seen began for severe shortness of breath thought to be due to CHF which progressed to respiratory failure requiring intubation.  She was discharged from this facility just 2 days ago,.  The transferring facility center here for further evaluation due to the elevated troponin and suspected EKG changes. At this time pt reports soreness under the left breast and SOB. She denies any other symptoms.  No fever chills.  No cough, hemoptysis, palpitations, or syncope.  No paresthesias or unilateral weakness.    In the emergency department repeat troponin showed decreased from greater than 50 during the last hospital visit, to 23 at the transferring facility, and down to 18 while here.   EKG showed inverted T-waves consistent with recent MI.  cardiology was consulted,no need for further workout,elevated Troponin duo to recent MI and  trending down,she was stable on RA and she has actually her home oxygen,patient has kristal discharged home with PCP and cardiology follow up.

## 2019-07-05 NOTE — ASSESSMENT & PLAN NOTE
Elevated BNP but CXR relatively clear. Diuresis and afterload reduction as tolerated. Ok for d/c. Will f/u prn

## 2019-07-16 NOTE — CHAPLAIN
Patient death. Notified [facility] .   released to  home.  ESTEBAN notified.  Ruled out for all donation #[phone].  Family located by [facility]'s office.

## 2019-07-16 NOTE — ED NOTES
Patient stating that she cannot breath. Patient made aware that MD had ordered a steroid and a breathing treatment. Patient states verbal understanding. Patient states that she needs a fan because she is hot. Patient made aware that we do not have a fan in the ED at this time. NKD. Will continue to monitor patient. MD notified of patient's complaints at this time. Awaiting orders is any.

## 2019-07-16 NOTE — ED PROVIDER NOTES
"Encounter Date: 2019    SCRIBE #1 NOTE: I, Nasrin Durham, am scribing for, and in the presence of,  Madhu Mireles MD. I have scribed the entire note.       History     Chief Complaint   Patient presents with    Shortness of Breath     EMS called for reports of SOB, per EMS they visit pt home twice a week. She lives alone     74 year old female who has a history of asthma, COPD, and CHF presents to the ED with complains of shortness of breath. Patient states she did a breathing treatment at home but it did not help. Patient had a cardiac stent put in last week. Patient was an avid smoker but quit. Patient denies fever, chills, chest pain, and any other associated symptoms.    The history is provided by the patient. No  was used.     Review of patient's allergies indicates:   Allergen Reactions    Morphine      Pt states, "I'm not allergic to morphine they gave me too much at [facility]. Instead of giving me 2mg she gave me 5mg and I was almost dead."     Codeine      Blisters vs sweling (pt unsure which one)    Iodinated contrast- oral and iv dye     Iodine containing multivitamin      Blisters vs swelling (pt unsure which one.)    Lidocaine      Past Medical History:   Diagnosis Date    Asthma     Cancer 10/10/2014    Bone cancer     CHF (congestive heart failure)     Cigarette smoker     COPD (chronic obstructive pulmonary disease)     Diabetes mellitus     Diabetes mellitus type II     Emphysema of lung     [place] (hard of hearing)     Hypercholesteremia     Hypertension     Myocardial infarct 2013    On home oxygen therapy     Palpitations     Pneumonia 2019    PVD (peripheral vascular disease)     Seizures     Stroke     Unsteady gait     uses a cane & walker     Past Surgical History:   Procedure Laterality Date    APPENDECTOMY       SECTION      CHOLECYSTECTOMY      HYSTERECTOMY      INSERTION, STENT, CORONARY ARTERY N/A 2019    " Performed by [facility]in [facility]aly [facility] at [facility] CATH LAB    IVUS, Coronary  7/1/2019    Performed by Fermin [facility]aly MD at [facility] CATH LAB    Left heart cath Left 7/1/2019    Performed by Job Turcios MD at [facility] CATH LAB    Left heart cath Left 6/14/2019    Performed by Fermin [facility]aly [facility] at [facility] CATH LAB    Percutaneous coronary intervention N/A 7/1/2019    Performed by Fermin [facility] at [facility] CATH LAB    VASCULAR SURGERY      stent in L leg; unable to place in R leg d/t blockage     Family History   Problem Relation Age of Onset    Diabetes Mother     Asthma Mother     Cancer Father     Breast cancer Sister     Colon cancer Neg Hx     Ovarian cancer Neg Hx      Social History     Tobacco Use    Smoking status: Current Every Day Smoker     Packs/day: 0.50     Years: 56.00     Pack years: 28.00     Types: Cigarettes    Smokeless tobacco: Current User   Substance Use Topics    Alcohol use: No    Drug use: No     Review of Systems   Constitutional: Negative for chills and fever.   HENT: Negative for congestion, ear pain, rhinorrhea and sore throat.    Eyes: Negative for pain and visual disturbance.   Respiratory: Positive for shortness of breath. Negative for cough.    Cardiovascular: Negative for chest pain.   Gastrointestinal: Negative for abdominal pain, diarrhea, nausea and vomiting.   Genitourinary: Negative for dysuria.   Musculoskeletal: Negative for back pain and neck pain.   Skin: Negative for rash.   Neurological: Negative for headaches.       Physical Exam     Initial Vitals [07/16/19 1017]   BP Pulse Resp Temp SpO2   130/70 97 (!) 36 98.2 °F (36.8 °C) 100 %      MAP       --         Physical Exam    Nursing note and vitals reviewed.  Constitutional: She is not diaphoretic. No distress.   HENT:   Head: Normocephalic and atraumatic.   Mouth/Throat: Oropharynx is clear and moist.   Eyes: EOM are normal. Pupils are equal, round, and reactive to light. No scleral icterus.   Neck:  Normal range of motion. Neck supple. No JVD present.   Cardiovascular: Normal rate, regular rhythm and intact distal pulses.   Pulmonary/Chest: No stridor. No respiratory distress. She has no wheezes.   Patient has bilateral coarse breath sounds and increased work of breathing.   Abdominal: Soft. Bowel sounds are normal. She exhibits no distension. There is no tenderness.   Musculoskeletal: Normal range of motion. She exhibits no edema or tenderness.        Right lower leg: She exhibits no swelling.        Left lower leg: She exhibits no swelling.   Neurological: She is alert and oriented to person, place, and time. She has normal strength. No cranial nerve deficit or sensory deficit.   Skin: Skin is warm and dry.   Psychiatric: She has a normal mood and affect.         ED Course   Procedures  Labs Reviewed   CBC W/ AUTO DIFFERENTIAL - Abnormal; Notable for the following components:       Result Value    RBC 3.89 (*)     Hemoglobin 9.8 (*)     Hematocrit 31.4 (*)     Mean Corpuscular Volume 81 (*)     Mean Corpuscular Hemoglobin 25.2 (*)     Mean Corpuscular Hemoglobin Conc 31.2 (*)     RDW 21.4 (*)     Basophil% 2.0 (*)     All other components within normal limits   COMPREHENSIVE METABOLIC PANEL - Abnormal; Notable for the following components:    Glucose 161 (*)     All other components within normal limits   TROPONIN I - Abnormal; Notable for the following components:    Troponin I 5.913 (*)     All other components within normal limits   B-TYPE NATRIURETIC PEPTIDE - Abnormal; Notable for the following components:    BNP 2,210 (*)     All other components within normal limits   TROPONIN I        ECG Results          EKG 12-lead (In process)  Result time 07/16/19 11:30:39    In process by Interface, Lab In [facility] (07/16/19 11:30:39)                 Narrative:    Test Reason : R07.9,    Vent. Rate : 083 BPM     Atrial Rate : 083 BPM     P-R Int : 150 ms          QRS Dur : 084 ms      QT Int : 412 ms       P-R-T  Axes : 042 073 032 degrees     QTc Int : 484 ms    Sinus rhythm with occasional Premature ventricular complexes  Nonspecific ST and T wave abnormality  Abnormal ECG  When compared with ECG of 05-JUL-2019 03:23,  Significant changes have occurred    Referred By: [email or web address]   SELF           Confirmed By:                   In process by Interface, Lab In [facility] (07/16/19 11:26:04)                 Narrative:    Test Reason : R07.9,    Vent. Rate : 083 BPM     Atrial Rate : 083 BPM     P-R Int : 150 ms          QRS Dur : 084 ms      QT Int : 412 ms       P-R-T Axes : 042 073 032 degrees     QTc Int : 484 ms    Sinus rhythm with occasional Premature ventricular complexes  Nonspecific ST and T wave abnormality  Abnormal ECG  When compared with ECG of 05-JUL-2019 03:23,  Significant changes have occurred    Referred By: AAAREFLEYDI   SELF           Confirmed By:                             Imaging Results          X-Ray Chest AP Portable (Final result)  Result time 07/16/19 11:35:30    Final result by Ian Lamb MD (07/16/19 11:35:30)                 Impression:      1. Mild enlargement of the cardiac silhouette, pulmonary interstitial edema and trace to small right layering pleural effusion with overlying atelectasis and/or pneumonia.      Electronically signed by: Ian Lamb  Date:    07/16/2019  Time:    11:35             Narrative:    EXAMINATION:  XR CHEST AP PORTABLE    CLINICAL HISTORY:  Chest Pain;    TECHNIQUE:  Single frontal portable view of the chest was performed.    COMPARISON:  Chest radiograph 07/04/2019    FINDINGS:  Mild enlargement of the cardiac silhouette, not significantly changed.  Mediastinal silhouette is within normal limits.    Pulmonary interstitial edema and trace to small right layering pleural effusion with overlying atelectasis and/or pneumonia.  No pneumothorax.    Multilevel degenerative changes of the imaged spine.                                 Medical Decision Making:   Clinical  Tests:   Lab Tests: Ordered and Reviewed  Radiological Study: Reviewed and Ordered  Medical Tests: Ordered and Reviewed  ED Management:  74 year old female who has a history of asthma, COPD, and CHF presents to the ED with complains of shortness of breath. While in the ED, patient is given 325 mg aspirin tablet, albuterol-ipratropium, and 125 mg methylprednisolone. Chest x-ray is pending. Labs are pending. Will reassess patient.    Updates:      Pt went into AFib with RVR at rate in the 140's ans as I walked out of the room to order Cardizem she then became unresponsive with no palpable pulse.  Chest compressions were initiated and ACLS protocol was utilized during the code.  Pt defibrillated several times with 200 J and given multiple rounds of Epi as well as Ca, Mg, bicarb and amiodarone to no avail.  Transcutaneous pacing was also attempted.  Bedside echo ultimately showed PEA with no cardiac activity so time of death was called at 1459.  Family and the  have been notified.    Madhu Mireles MD                Scribe Attestation:   Scribe #1: I performed the above scribed service and the documentation accurately describes the services I performed. I attest to the accuracy of the note.    Attending Attestation:           Physician Attestation for Scribe:  Physician Attestation Statement for Scribe #1: I, Madhu Mireles MD, reviewed documentation, as scribed by Nasrin Durham in my presence, and it is both accurate and complete.                    Clinical Impression:       ICD-10-CM ICD-9-CM   1. Atrial fibrillation with RVR I48.91 427.31   2. Chest pain R07.9 786.50   3. Acute on chronic congestive heart failure, unspecified heart failure type I50.9 428.0   4. Shortness of breath R06.02 786.05   5. Ventricular fibrillation I49.01 427.41                                Madhu Mireles MD  07/16/19 [id number]

## 2019-07-16 NOTE — ED NOTES
Patient sitting at the edge of the bed stating that she is hot. Per MD patient able to have ice cubes. Patient eating ice cubes while being fanned. Patient redirected to relax and take deep breaths. Patient states that she cannot breath. Patient placed on 2L NC. Will continue to monitor patient. MD aware.

## 2019-10-24 NOTE — ED PROVIDER NOTES
"Encounter Date: 5/29/2019    SCRIBE #1 NOTE: I, Candy Nguyễn, am scribing for, and in the presence of,  Dr. Diop. I have scribed the following portions of the note - Other sections scribed: HPI,ROS,PE,DDx.       History     Chief Complaint   Patient presents with    Shortness of Breath     ems states [place] and pt became sob, felt she could not catch her breath. pt states hx of copd & asthma. had duoneb enroute and is now 100% on room air      CC:  Shortness of breath    HPI:  This is a 74 y.o. female with a PMHx of COPD and asthma who presents to the ED via EMS with a cc of SOB prior to arrival. Patient states she [place] when she could not catch her breath. She notes feeling chest tightness all day. Initially patient states she felt SOB last night at home.  Symptoms improved but returned this evening.  She reports sitting in front of fan and using her at home O2 and she felt fine. Pt denies CP, cough, wheezing, fever, nausea, vomiting,abdominal pain, and back pain. Symptoms are relieved by duoneb treatment she received from EMS enroute. Patient reports prior history of similar symptoms. Patient says she smoked 2 1/2 cigarettes today.  States that smoking made her feel worse.  She thinks she may have fluid build up again.  Patient has history of CHF.  Her ejection fraction was 35% with diastolic dysfunction on last ECHO in February 2019.        The history is provided by the patient. No  was used.     Review of patient's allergies indicates:   Allergen Reactions    Morphine      Pt states, "I'm not allergic to morphine they gave me too much at [facility]. Instead of giving me 2mg she gave me 5mg and I was almost dead."     Codeine      Blisters vs sweling (pt unsure which one)    Iodinated contrast- oral and iv dye     Iodine containing multivitamin      Blisters vs swelling (pt unsure which one.)    Lidocaine      Past Medical History:   Diagnosis Date    " Physical Therapy Daily Treatment    Visit Count: 6    Plan of Care: 10/4/2019 Through: 11/1/2019  Insurance Information: Payor: [employer]  Authorization Needed: No  Maximum Visit Limit Per Year: Medical Necessity   CoPay: $0.00  Referred by: Shannon Miner DPM; Next provider visit (if known/scheduled): 10/31/19  Medical Diagnosis (from order):       Diagnosis Information             Diagnosis      728.71 (ICD-9-CM) - M72.2 (ICD-10-CM) - Bilateral plantar fasciitis      729.5 (ICD-9-CM) - M79.671, M79.672 (ICD-10-CM) - Pain of both heels      726.73 (ICD-9-CM) - M77.31 (ICD-10-CM) - Calcaneal spur of right foot                  Treatment Diagnosis: bilateral foot symptoms with increased pain/symptoms, impaired posture, impaired muscle length/flexibility, impaired gait, impaired activity tolerance     Date of onset/injury: Approximately 1 year.  Diagnosis Precautions: None  Chart reviewed at time of initial evaluation (relevant co-morbidities, allergies, tests and medications listed): Yes     X-ray  shows small spur on right heel.     SUBJECTIVE   Morning start up pain at about 5-6/10 then loosens up. Still trying to get back onto a more consistent stretching routine.  Overall pain in the morning is reducing. Wearing night splint on the L more so than R-- this is helping.   Pt would like to move forward with custom orthotics.  Current Pain (0-10 scale): L 2/10 , 2/10 R  Functional Change: Morning startup pain a little less.      OBJECTIVE   Foot Affected/Involved: bilateral L > R  Posture/Observation: Normal arch height  Gait Analysis: Normal  Neurological Findings: None    Ankle PROM 1st resist: * denotes pain   Norm Left Right Comment/Position   Dorsiflexion 20° 6° 6°    Plantar Flexion 50° ° °    Inversion 35° ° °    Eversion 15° ° °    First MTP Dorsiflexion 60° ° °    First MTP Plantar Flexion 45° ° °    Comments:     Ankle PROM 1st resist: * denotes pain   Norm Left End Feel Right End Feel   Dorsiflexion 20° 6°  6°     Plantar Flexion 50° °  °    Inversion 35° °  °    Eversion 15° °  °    First MTP Dorsiflexion 90° °  °    First MTP Plantar Flexion 45° °  °    Comments: Grossly WNL unless otherwise noted    First Ray:   Right: plantarflexed  Left: plantarflexed    First Ray Mobility:  mobile    Midtarsal Mobility: normal lockup in subtalar supination    Functional Leg Strength: * denotes pain   Left Right Comment/Position   Heel Raises Pain medial arch Pain medial arch    Comments:     Subtalar joint Neutral: (mm)   Left Right   Rearfoot Varus 0  0    Forefoot Varus 0  0    Forefoot Valgus 0  0    Comments:     Callus Pattern:normal    Palpation: Tenderness medial arch at plantar fascia insertion at medial calcaneus left greater than right    Special Tests:   Talar tilt test: Negative, indication for malalignment  Navicular drop test:  Negative, >10-12 mm  = pes plannus  Comments:     Treatment   Orthotic objective measures as above  Molds obtained for custom orthotics    Manual Therapy: HELD this date  STM through plantar fascia   Plantar fascia stretching  STM through gastroc  PTA assisted gastroc and soleus stretching  Discussed POC/goals, pt agreeable to begin tapering to 1x/week beginning next week as she feels symptoms are improving. Pt to schedule following session today.     Ultrasound (73295): #5  Location: B Plantar fascia (x 5 minutes each side)  Position: prone  Duration: 10 minutes Duty Cycle: 100% duty cycle  Frequency: 1 Mhz  Intensity: 1.0 W/cm2   Results: no change in symptoms immediately following modality; no adverse reaction to treatment    Modalities:  Patient has been made aware of potential contraindications and possible risks associated with the use of the following modalities and has agreed    Iontophoresis (32210): #3  Location: bilateral plantarfascia; Position: long sitting.  Active pad: negative pole; 1.5 ml dexamethosone sodium phosphate, 4mg/ml].  Inactive pad: positive pole; 1.5 ml sterile  Asthma     Cancer 10/10/2014    Bone cancer     CHF (congestive heart failure)     Cigarette smoker     COPD (chronic obstructive pulmonary disease)     Diabetes mellitus     Diabetes mellitus type II     Emphysema of lung     [place] (hard of hearing)     Hypercholesteremia     Hypertension     Myocardial infarct 2013    On home oxygen therapy     Palpitations     Pneumonia 2019    PVD (peripheral vascular disease)     Seizures     Stroke     Unsteady gait     uses a cane & walker     Past Surgical History:   Procedure Laterality Date    APPENDECTOMY       SECTION      CHOLECYSTECTOMY      HYSTERECTOMY      VASCULAR SURGERY      stent in L leg; unable to place in R leg d/t blockage     Family History   Problem Relation Age of Onset    Diabetes Mother     Asthma Mother     Cancer Father     Breast cancer Sister     Colon cancer Neg Hx     Ovarian cancer Neg Hx      Social History     Tobacco Use    Smoking status: Current Every Day Smoker     Packs/day: 0.50     Years: 56.00     Pack years: 28.00     Types: Cigarettes    Smokeless tobacco: Current User   Substance Use Topics    Alcohol use: No    Drug use: No     Review of Systems   Constitutional: Negative for chills, diaphoresis, fatigue and fever.   HENT: Negative for congestion, sinus pain and sore throat.    Respiratory: Positive for shortness of breath and wheezing. Negative for cough and stridor.    Cardiovascular: Negative for chest pain, palpitations and leg swelling.   Gastrointestinal: Negative for abdominal pain, diarrhea, nausea and vomiting.   Genitourinary: Negative for dysuria, flank pain and frequency.   Musculoskeletal: Negative for back pain and joint swelling.   Neurological: Negative for dizziness, seizures, syncope, facial asymmetry, speech difficulty, weakness, numbness and headaches.   Psychiatric/Behavioral: Negative for confusion.       Physical Exam     Initial Vitals [19 2219]   BP  Pulse Resp Temp SpO2   (!) 160/92 80 (!) 28 98.6 °F (37 °C) 99 %      MAP       --         Physical Exam    Nursing note and vitals reviewed.  Constitutional: Vital signs are normal. She appears well-developed and well-nourished. She is not diaphoretic. No distress.   HENT:   Head: Normocephalic and atraumatic.   Right Ear: External ear normal.   Left Ear: External ear normal.   Nose: Nose normal.   Mouth/Throat: Oropharynx is clear and moist.   Eyes: Conjunctivae and EOM are normal. Pupils are equal, round, and reactive to light. No scleral icterus.   Neck: Normal range of motion. Neck supple. No tracheal deviation present. JVD present.   Cardiovascular: Normal rate, regular rhythm and normal heart sounds. Exam reveals no gallop.    No murmur heard.  Pulmonary/Chest: No stridor. No respiratory distress. She has no wheezes. She has rhonchi (Faint at bilateral bases.). She has rales (Faint at bilateral bases.). She exhibits no tenderness.   Abdominal: Soft. Bowel sounds are normal. She exhibits no distension. There is no tenderness. There is no rigidity, no rebound and no guarding.   Musculoskeletal: Normal range of motion. She exhibits no edema or tenderness.   Neurological: She is alert and oriented to person, place, and time. She has normal strength and normal reflexes. She displays normal reflexes. No cranial nerve deficit. GCS score is 15. GCS eye subscore is 4. GCS verbal subscore is 5. GCS motor subscore is 6.   Skin: Skin is warm and dry. No rash noted.   Psychiatric: She has a normal mood and affect. Her behavior is normal. Judgment and thought content normal.         ED Course   Procedures  Labs Reviewed   CBC W/ AUTO DIFFERENTIAL - Abnormal; Notable for the following components:       Result Value    Hemoglobin 10.5 (*)     Hematocrit 33.9 (*)     Mean Corpuscular Volume 81 (*)     Mean Corpuscular Hemoglobin 25.0 (*)     Mean Corpuscular Hemoglobin Conc 31.0 (*)     RDW 19.5 (*)     All other components  saline. 120 mA, 80 mA-minute.  Pt was educated to remove electrode from skin 12 hours after treatment and to monitor any skin reaction.    Skilled input: Professional skill was required to determine the effectiveness of past procedures and appropriateness of today's procedures, along with providing understandable education for impairment management by providing verbal, tactile, and visual cues for effective performance of all of the above procedure details essential for facilitating healing and avoiding delays in recovery.     Home Program:   Standing calf stretch 30\" x 3, 3x/d  Rolling with ice bottle    Writer verbally educated the patient and received verbal consent from the patient on hand placement, positioning of patient, and techniques to be performed today including clothing adjustments for techniques, therapist position for techniques, hand placement and palpation for techniques, modality application as described above and how they are pertinent to the patient's plan of care.      Suggestions for next session as indicated: progress per plan of care, ultrasound, continue ionto, soft tissue mobilization to bilateral gastroc and plantar fascia. May consider addition of cushioned heel cups at later date as option if not responding.      ASSESSMENT   Patient will benefit from full length custom orthotic with 3 mm shell, bilateral heel spur cut outs with gel fill, neutral posting in order to decrease forces at plantar fascia insertion.  Result of above outlined education: Verbalizes understanding and Demonstrates understanding    THERAPY DAILY BILLING   Insurance: [employer]. N/A     Evaluation Procedures:  No evaluation codes were used on this date of service    Timed Procedures:   Iontophoresis, 10 minutes   Ultrasound, 10 minutes    Untimed Procedures:   custom foot orthotic; foot insert removable mold to model      Total Treatment Time: 45 minutes   within normal limits   COMPREHENSIVE METABOLIC PANEL - Abnormal; Notable for the following components:    Glucose 170 (*)     ALT 8 (*)     All other components within normal limits   TROPONIN I - Abnormal; Notable for the following components:    Troponin I 0.052 (*)     All other components within normal limits   B-TYPE NATRIURETIC PEPTIDE - Abnormal; Notable for the following components:     (*)     All other components within normal limits   MAGNESIUM     EKG Readings: (Independently Interpreted)   Initial Reading: No STEMI. Rhythm: Normal Sinus Rhythm. Heart Rate: 91. Ectopy: No Ectopy. Conduction: Normal. ST Segments: Non-Specific ST Segment Depression. T Waves: Normal. Axis: Normal.   LVH with strain pattern.  No acute changes when compared to March 7, 2019.       Imaging Results          X-Ray Chest AP Portable (Final result)  Result time 05/29/19 22:52:15    Final result by Lauren Mccord MD (05/29/19 22:52:15)                 Impression:      No acute intrathoracic abnormality detected.  Linear scarring or atelectasis at the right lung base.      Electronically signed by: Lauren Mccord  Date:    05/29/2019  Time:    22:52             Narrative:    EXAMINATION:  AP PORTABLE CHEST    CLINICAL HISTORY:  CHF;    TECHNIQUE:  AP portable chest radiograph was submitted.    COMPARISON:  03/07/2019    FINDINGS:  AP portable chest radiograph demonstrates a cardiac silhouette within normal limits.  There is no focal consolidation, pneumothorax, or pleural effusion.  Linear scarring or atelectasis is seen at the right lung base.  The bones are diffusely osteopenic.  Moderate dextroscoliosis is noted.                                 Medical Decision Making:   Differential Diagnosis:   COPD,CHF  Clinical Tests:   Lab Tests: Ordered and Reviewed  The following lab test(s) were unremarkable: CBC, CMP, Troponin and BNP  Radiological Study: Ordered and Reviewed  Medical Tests: Ordered and Reviewed  ED  Management:  Symptoms consistent with mild CHF exacerbation as well COPD.  Will give a dose of IV Lasix.  Rhonchi improved after nebs.  If patient's symptoms improve after diuresis she will be discharged.  Chest x-ray is unchanged from previous.  BNP is slightly above baseline.  Troponin is at baseline.  There are no changes in EKG.  Patient continues to deny chest pain. Patient's oxygen saturations 100% on 2 L. she is on 2 L at home.  She is in no distress.    0220:  Symptoms improved.  Patient comfortable.  Patient is satting 100% on 2 L. she has diuresed over a L. will discharge.            Scribe Attestation:   Scribe #1: I performed the above scribed service and the documentation accurately describes the services I performed. I attest to the accuracy of the note.               Clinical Impression:     1. Acute on chronic combined systolic and diastolic congestive heart failure    2. Shortness of breath    3. Chronic obstructive pulmonary disease, unspecified COPD type           Ash Diop MD  05/30/19 0224

## 2020-01-31 NOTE — PROGRESS NOTES
1925- Pt placed on Bipap 10/5/.30. Pt tolerating Bipap fairly well. Pt educated on device and its benefits, needs reinforcement. Bipap plugged into red outlet. Alarms are set and functioning. NARN. Will continue to monitor.   normal... Well appearing, awake, alert, oriented to person, place, time/situation and in no apparent distress.

## 2021-06-04 NOTE — NURSING
Admit navigator done in the ED   [Eye Pain] : no eye pain [Dysphagia] : no dysphagia [Loss of Hearing] : no loss of hearing [Confused] : no confusion [Dizziness] : no dizziness [Fainting] : no fainting [Difficulty Walking] : no difficulty walking [FreeTextEntry7] : mild diarrhea since she started her antibiotics on tuesday. denies any blood in stool, weakness.  [de-identified] : intermittent periods of "pulling" sensation to forehead

## 2021-11-09 NOTE — ASSESSMENT & PLAN NOTE
stable   Render Note In Bullet Format When Appropriate: No Duration Of Freeze Thaw-Cycle (Seconds): 10 Post-Care Instructions: I reviewed with the patient in detail post-care instructions. Patient is to wear sunprotection, and avoid picking at any of the treated lesions. Pt may apply Vaseline to crusted or scabbing areas. Detail Level: Detailed Number Of Freeze-Thaw Cycles: 2 freeze-thaw cycles Render Post-Care Instructions In Note?: yes Consent: The patient's consent was obtained including but not limited to risks of crusting, scabbing, blistering, scarring, darker or lighter pigmentary change, recurrence, incomplete removal and infection. Medical Necessity Clause: This procedure was medically necessary because the lesions that were treated were: Medical Necessity Information: It is in your best interest to select a reason for this procedure from the list below. All of these items fulfill various CMS LCD requirements except the new and changing color options. Duration Of Freeze Thaw-Cycle (Seconds): 5-10

## 2025-06-20 NOTE — ASSESSMENT & PLAN NOTE
Known ischemic CM - EF 35%. Occluded RCA and Hx Cx stent 2014. Troponin increased to 20. S/P SAM to Cx. Ok for d/c. Stressed the importance of compliance with plavix/ASA   Thank you for coming to see us today!     We are going to give you a referral for Pain managment  Please call central scheduling to make this appointment.     Please follow up with us as needed

## (undated) DEVICE — CATH TREK RX 3.0MM X 20MM

## (undated) DEVICE — KIT SYR REUSABLE

## (undated) DEVICE — ELECTRODE EDGE SYSTEM RTS

## (undated) DEVICE — CATH DXTERITY PIGSTR 110CM 6FR

## (undated) DEVICE — CATH DXTERITY NOTO 100CM 6FR

## (undated) DEVICE — GUIDEWIRE SAFE T J TIP 145X2.5

## (undated) DEVICE — INTRODUCER CATH 6F 11CM

## (undated) DEVICE — HEMOSTAT VASC BAND REG 24CM

## (undated) DEVICE — GUIDEWIRE VERRATA + JTIP 185CM

## (undated) DEVICE — CATH EAGLE EYE PLATINUM

## (undated) DEVICE — WIRE GUIDE SAFE-T-J .035 260CM

## (undated) DEVICE — CATH JACKY RADIAL 3.5 110CM

## (undated) DEVICE — CATH TREK RX 2.50MM X 12MM

## (undated) DEVICE — CATH TREK RX 3.5MM X 20MM

## (undated) DEVICE — CATH DXTERITY JL40 100CM 6FR

## (undated) DEVICE — GUIDEWIRE TORQUE 3CM/260CML

## (undated) DEVICE — GUIDEWIRE PROWATER .014X180CM

## (undated) DEVICE — CATH GUID EBU4 LAUNCH 6FRX100

## (undated) DEVICE — KIT GLIDESHEATH SLEND 6FR 10CM

## (undated) DEVICE — KIT MANIFOLD LOW PRESS TUBING

## (undated) DEVICE — VALVE CONTROL COPILOT

## (undated) DEVICE — OMNIPAQUE 350MG 150ML VIAL

## (undated) DEVICE — PACK CATH LAB

## (undated) DEVICE — INFLATOR ADVANTAGE ENCORE 26

## (undated) DEVICE — CATH DXTERITY 3DRC 100CM 6FR

## (undated) DEVICE — KIT HAND CONTROL HIGH PRESSUR